# Patient Record
Sex: MALE | Race: WHITE | Employment: OTHER | URBAN - METROPOLITAN AREA
[De-identification: names, ages, dates, MRNs, and addresses within clinical notes are randomized per-mention and may not be internally consistent; named-entity substitution may affect disease eponyms.]

---

## 2018-12-07 ENCOUNTER — HOSPITAL ENCOUNTER (INPATIENT)
Age: 73
LOS: 6 days | Discharge: HOME | DRG: 189 | End: 2018-12-13
Attending: EMERGENCY MEDICINE | Admitting: INTERNAL MEDICINE
Payer: MEDICARE

## 2018-12-07 ENCOUNTER — APPOINTMENT (OUTPATIENT)
Dept: CT IMAGING | Age: 73
DRG: 189 | End: 2018-12-07
Payer: MEDICARE

## 2018-12-07 DIAGNOSIS — I31.39 PERICARDIAL EFFUSION: ICD-10-CM

## 2018-12-07 DIAGNOSIS — K62.5 RECTAL BLEED: ICD-10-CM

## 2018-12-07 DIAGNOSIS — I48.91 ATRIAL FIBRILLATION, UNSPECIFIED TYPE (HCC): ICD-10-CM

## 2018-12-07 DIAGNOSIS — J96.01 ACUTE RESPIRATORY FAILURE WITH HYPOXEMIA (HCC): Primary | ICD-10-CM

## 2018-12-07 DIAGNOSIS — E87.70 HYPERVOLEMIA, UNSPECIFIED HYPERVOLEMIA TYPE: ICD-10-CM

## 2018-12-07 PROBLEM — J96.02 ACUTE RESPIRATORY FAILURE WITH HYPOXIA AND HYPERCAPNIA (HCC): Status: ACTIVE | Noted: 2018-12-07

## 2018-12-07 LAB
ABO/RH: NORMAL
ALBUMIN SERPL-MCNC: 4 G/DL (ref 3.5–5.2)
ALP BLD-CCNC: 73 U/L (ref 40–129)
ALT SERPL-CCNC: 30 U/L (ref 0–40)
ANION GAP SERPL CALCULATED.3IONS-SCNC: 9 MMOL/L (ref 7–16)
ANTIBODY SCREEN: NORMAL
AST SERPL-CCNC: 28 U/L (ref 0–39)
B.E.: 5.4 MMOL/L (ref -3–3)
BASOPHILS ABSOLUTE: 0.04 E9/L (ref 0–0.2)
BASOPHILS RELATIVE PERCENT: 0.6 % (ref 0–2)
BILIRUB SERPL-MCNC: 2.1 MG/DL (ref 0–1.2)
BILIRUBIN URINE: NEGATIVE
BLOOD, URINE: NEGATIVE
BUN BLDV-MCNC: 23 MG/DL (ref 8–23)
CALCIUM SERPL-MCNC: 9.3 MG/DL (ref 8.6–10.2)
CHLORIDE BLD-SCNC: 98 MMOL/L (ref 98–107)
CLARITY: CLEAR
CO2: 35 MMOL/L (ref 22–29)
COHB: 1.3 % (ref 0–1.5)
COLOR: YELLOW
CREAT SERPL-MCNC: 1.8 MG/DL (ref 0.7–1.2)
CRITICAL: ABNORMAL
DATE ANALYZED: ABNORMAL
DATE OF COLLECTION: ABNORMAL
EKG ATRIAL RATE: 93 BPM
EKG Q-T INTERVAL: 392 MS
EKG QRS DURATION: 86 MS
EKG QTC CALCULATION (BAZETT): 460 MS
EKG R AXIS: 91 DEGREES
EKG T AXIS: -96 DEGREES
EKG VENTRICULAR RATE: 83 BPM
EOSINOPHILS ABSOLUTE: 0.07 E9/L (ref 0.05–0.5)
EOSINOPHILS RELATIVE PERCENT: 1 % (ref 0–6)
GFR AFRICAN AMERICAN: 45
GFR NON-AFRICAN AMERICAN: 37 ML/MIN/1.73
GLUCOSE BLD-MCNC: 91 MG/DL (ref 74–99)
GLUCOSE URINE: NEGATIVE MG/DL
HCO3: 35.3 MMOL/L (ref 22–26)
HCT VFR BLD CALC: 48.5 % (ref 37–54)
HEMOGLOBIN: 14.4 G/DL (ref 12.5–16.5)
HHB: 5.2 % (ref 0–5)
IMMATURE GRANULOCYTES #: 0.02 E9/L
IMMATURE GRANULOCYTES %: 0.3 % (ref 0–5)
INR BLD: 1.3
KETONES, URINE: NEGATIVE MG/DL
LAB: ABNORMAL
LACTIC ACID: 1.2 MMOL/L (ref 0.5–2.2)
LEUKOCYTE ESTERASE, URINE: NEGATIVE
LV EF: 50 %
LVEF MODALITY: NORMAL
LYMPHOCYTES ABSOLUTE: 0.72 E9/L (ref 1.5–4)
LYMPHOCYTES RELATIVE PERCENT: 10.1 % (ref 20–42)
Lab: ABNORMAL
MCH RBC QN AUTO: 30.4 PG (ref 26–35)
MCHC RBC AUTO-ENTMCNC: 29.7 % (ref 32–34.5)
MCV RBC AUTO: 102.3 FL (ref 80–99.9)
METHB: 0.3 % (ref 0–1.5)
MODE: ABNORMAL
MONOCYTES ABSOLUTE: 0.64 E9/L (ref 0.1–0.95)
MONOCYTES RELATIVE PERCENT: 8.9 % (ref 2–12)
NEUTROPHILS ABSOLUTE: 5.67 E9/L (ref 1.8–7.3)
NEUTROPHILS RELATIVE PERCENT: 79.1 % (ref 43–80)
NITRITE, URINE: NEGATIVE
O2 CONTENT: 19.4 ML/DL
O2 SATURATION: 94.7 % (ref 92–98.5)
O2HB: 93.2 % (ref 94–97)
OPERATOR ID: 7874
PATIENT TEMP: 37 C
PCO2: 78 MMHG (ref 35–45)
PDW BLD-RTO: 16 FL (ref 11.5–15)
PH BLOOD GAS: 7.27 (ref 7.35–7.45)
PH UA: 5.5 (ref 5–9)
PLATELET # BLD: 203 E9/L (ref 130–450)
PMV BLD AUTO: 11.8 FL (ref 7–12)
PO2: 85.1 MMHG (ref 60–100)
POTASSIUM SERPL-SCNC: 4.8 MMOL/L (ref 3.5–5)
PRO-BNP: 2523 PG/ML (ref 0–125)
PROTEIN UA: NEGATIVE MG/DL
PROTHROMBIN TIME: 15.5 SEC (ref 9.3–12.4)
RBC # BLD: 4.74 E12/L (ref 3.8–5.8)
SODIUM BLD-SCNC: 142 MMOL/L (ref 132–146)
SOURCE, BLOOD GAS: ABNORMAL
SPECIFIC GRAVITY UA: 1.01 (ref 1–1.03)
THB: 14.8 G/DL (ref 11.5–16.5)
TIME ANALYZED: 933
TOTAL PROTEIN: 7.5 G/DL (ref 6.4–8.3)
TROPONIN: <0.01 NG/ML (ref 0–0.03)
UROBILINOGEN, URINE: 0.2 E.U./DL
WBC # BLD: 7.2 E9/L (ref 4.5–11.5)

## 2018-12-07 PROCEDURE — 2580000003 HC RX 258: Performed by: INTERNAL MEDICINE

## 2018-12-07 PROCEDURE — 99223 1ST HOSP IP/OBS HIGH 75: CPT | Performed by: INTERNAL MEDICINE

## 2018-12-07 PROCEDURE — 84484 ASSAY OF TROPONIN QUANT: CPT

## 2018-12-07 PROCEDURE — 94760 N-INVAS EAR/PLS OXIMETRY 1: CPT

## 2018-12-07 PROCEDURE — 80053 COMPREHEN METABOLIC PANEL: CPT

## 2018-12-07 PROCEDURE — 83880 ASSAY OF NATRIURETIC PEPTIDE: CPT

## 2018-12-07 PROCEDURE — 6370000000 HC RX 637 (ALT 250 FOR IP): Performed by: INTERNAL MEDICINE

## 2018-12-07 PROCEDURE — 99222 1ST HOSP IP/OBS MODERATE 55: CPT | Performed by: INTERNAL MEDICINE

## 2018-12-07 PROCEDURE — 86900 BLOOD TYPING SEROLOGIC ABO: CPT

## 2018-12-07 PROCEDURE — 87486 CHLMYD PNEUM DNA AMP PROBE: CPT

## 2018-12-07 PROCEDURE — 87633 RESP VIRUS 12-25 TARGETS: CPT

## 2018-12-07 PROCEDURE — C9113 INJ PANTOPRAZOLE SODIUM, VIA: HCPCS | Performed by: EMERGENCY MEDICINE

## 2018-12-07 PROCEDURE — 93005 ELECTROCARDIOGRAM TRACING: CPT | Performed by: EMERGENCY MEDICINE

## 2018-12-07 PROCEDURE — 86901 BLOOD TYPING SEROLOGIC RH(D): CPT

## 2018-12-07 PROCEDURE — 94660 CPAP INITIATION&MGMT: CPT

## 2018-12-07 PROCEDURE — 87798 DETECT AGENT NOS DNA AMP: CPT

## 2018-12-07 PROCEDURE — 71275 CT ANGIOGRAPHY CHEST: CPT

## 2018-12-07 PROCEDURE — 85610 PROTHROMBIN TIME: CPT

## 2018-12-07 PROCEDURE — 2700000000 HC OXYGEN THERAPY PER DAY

## 2018-12-07 PROCEDURE — 99221 1ST HOSP IP/OBS SF/LOW 40: CPT | Performed by: SURGERY

## 2018-12-07 PROCEDURE — 2060000000 HC ICU INTERMEDIATE R&B

## 2018-12-07 PROCEDURE — 6360000002 HC RX W HCPCS: Performed by: EMERGENCY MEDICINE

## 2018-12-07 PROCEDURE — 85025 COMPLETE CBC W/AUTO DIFF WBC: CPT

## 2018-12-07 PROCEDURE — 93306 TTE W/DOPPLER COMPLETE: CPT

## 2018-12-07 PROCEDURE — 99285 EMERGENCY DEPT VISIT HI MDM: CPT

## 2018-12-07 PROCEDURE — APPSS60 APP SPLIT SHARED TIME 46-60 MINUTES: Performed by: NURSE PRACTITIONER

## 2018-12-07 PROCEDURE — 87581 M.PNEUMON DNA AMP PROBE: CPT

## 2018-12-07 PROCEDURE — 2580000003 HC RX 258: Performed by: EMERGENCY MEDICINE

## 2018-12-07 PROCEDURE — 74174 CTA ABD&PLVS W/CONTRAST: CPT

## 2018-12-07 PROCEDURE — 83605 ASSAY OF LACTIC ACID: CPT

## 2018-12-07 PROCEDURE — 94640 AIRWAY INHALATION TREATMENT: CPT

## 2018-12-07 PROCEDURE — 86850 RBC ANTIBODY SCREEN: CPT

## 2018-12-07 PROCEDURE — 81003 URINALYSIS AUTO W/O SCOPE: CPT

## 2018-12-07 PROCEDURE — 87088 URINE BACTERIA CULTURE: CPT

## 2018-12-07 PROCEDURE — 6360000002 HC RX W HCPCS: Performed by: INTERNAL MEDICINE

## 2018-12-07 PROCEDURE — 82805 BLOOD GASES W/O2 SATURATION: CPT

## 2018-12-07 PROCEDURE — 6360000004 HC RX CONTRAST MEDICATION: Performed by: RADIOLOGY

## 2018-12-07 PROCEDURE — 36415 COLL VENOUS BLD VENIPUNCTURE: CPT

## 2018-12-07 RX ORDER — LEVOTHYROXINE SODIUM 0.1 MG/1
100 TABLET ORAL DAILY
Status: DISCONTINUED | OUTPATIENT
Start: 2018-12-07 | End: 2018-12-13 | Stop reason: HOSPADM

## 2018-12-07 RX ORDER — SODIUM CHLORIDE 0.9 % (FLUSH) 0.9 %
3 SYRINGE (ML) INJECTION PRN
Status: DISCONTINUED | OUTPATIENT
Start: 2018-12-07 | End: 2018-12-13 | Stop reason: HOSPADM

## 2018-12-07 RX ORDER — FUROSEMIDE 10 MG/ML
20 INJECTION INTRAMUSCULAR; INTRAVENOUS ONCE
Status: DISCONTINUED | OUTPATIENT
Start: 2018-12-07 | End: 2018-12-07

## 2018-12-07 RX ORDER — LIDOCAINE 50 MG/G
OINTMENT TOPICAL EVERY 30 MIN PRN
Status: DISCONTINUED | OUTPATIENT
Start: 2018-12-07 | End: 2018-12-13 | Stop reason: HOSPADM

## 2018-12-07 RX ORDER — FUROSEMIDE 10 MG/ML
40 INJECTION INTRAMUSCULAR; INTRAVENOUS 2 TIMES DAILY
Status: COMPLETED | OUTPATIENT
Start: 2018-12-07 | End: 2018-12-07

## 2018-12-07 RX ORDER — FORMOTEROL FUMARATE 20 UG/2ML
20 SOLUTION RESPIRATORY (INHALATION) 2 TIMES DAILY
Status: DISCONTINUED | OUTPATIENT
Start: 2018-12-07 | End: 2018-12-13 | Stop reason: HOSPADM

## 2018-12-07 RX ORDER — FUROSEMIDE 10 MG/ML
40 INJECTION INTRAMUSCULAR; INTRAVENOUS ONCE
Status: COMPLETED | OUTPATIENT
Start: 2018-12-07 | End: 2018-12-07

## 2018-12-07 RX ORDER — DOCUSATE SODIUM 100 MG/1
100 CAPSULE, LIQUID FILLED ORAL 2 TIMES DAILY
Status: DISCONTINUED | OUTPATIENT
Start: 2018-12-07 | End: 2018-12-13 | Stop reason: HOSPADM

## 2018-12-07 RX ORDER — IPRATROPIUM BROMIDE AND ALBUTEROL SULFATE 2.5; .5 MG/3ML; MG/3ML
1 SOLUTION RESPIRATORY (INHALATION)
Status: DISCONTINUED | OUTPATIENT
Start: 2018-12-07 | End: 2018-12-13 | Stop reason: HOSPADM

## 2018-12-07 RX ORDER — BUDESONIDE 0.25 MG/2ML
250 INHALANT ORAL 2 TIMES DAILY
Status: DISCONTINUED | OUTPATIENT
Start: 2018-12-07 | End: 2018-12-13 | Stop reason: HOSPADM

## 2018-12-07 RX ORDER — AMLODIPINE BESYLATE 5 MG/1
5 TABLET ORAL DAILY
Status: DISCONTINUED | OUTPATIENT
Start: 2018-12-07 | End: 2018-12-07

## 2018-12-07 RX ORDER — ACETAMINOPHEN 325 MG/1
650 TABLET ORAL EVERY 6 HOURS PRN
Status: DISCONTINUED | OUTPATIENT
Start: 2018-12-07 | End: 2018-12-13 | Stop reason: HOSPADM

## 2018-12-07 RX ORDER — SODIUM CHLORIDE 0.9 % (FLUSH) 0.9 %
10 SYRINGE (ML) INJECTION ONCE
Status: DISCONTINUED | OUTPATIENT
Start: 2018-12-07 | End: 2018-12-13 | Stop reason: HOSPADM

## 2018-12-07 RX ORDER — ATENOLOL 25 MG/1
25 TABLET ORAL DAILY
Status: DISCONTINUED | OUTPATIENT
Start: 2018-12-07 | End: 2018-12-08

## 2018-12-07 RX ORDER — SPIRONOLACTONE 25 MG/1
25 TABLET ORAL DAILY
Status: DISCONTINUED | OUTPATIENT
Start: 2018-12-07 | End: 2018-12-09

## 2018-12-07 RX ORDER — PANTOPRAZOLE SODIUM 40 MG/10ML
INJECTION, POWDER, LYOPHILIZED, FOR SOLUTION INTRAVENOUS
Status: DISPENSED
Start: 2018-12-07 | End: 2018-12-07

## 2018-12-07 RX ADMIN — Medication: at 17:12

## 2018-12-07 RX ADMIN — SODIUM CHLORIDE 8 MG/HR: 9 INJECTION, SOLUTION INTRAVENOUS at 10:56

## 2018-12-07 RX ADMIN — Medication: at 21:30

## 2018-12-07 RX ADMIN — ATENOLOL 25 MG: 25 TABLET ORAL at 17:12

## 2018-12-07 RX ADMIN — SPIRONOLACTONE 25 MG: 25 TABLET ORAL at 18:21

## 2018-12-07 RX ADMIN — Medication 3 ML: at 17:12

## 2018-12-07 RX ADMIN — SODIUM CHLORIDE 80 MG: 9 INJECTION, SOLUTION INTRAVENOUS at 10:55

## 2018-12-07 RX ADMIN — FUROSEMIDE 40 MG: 10 INJECTION, SOLUTION INTRAMUSCULAR; INTRAVENOUS at 10:56

## 2018-12-07 RX ADMIN — DOCUSATE SODIUM 100 MG: 100 CAPSULE, LIQUID FILLED ORAL at 21:30

## 2018-12-07 RX ADMIN — IPRATROPIUM BROMIDE AND ALBUTEROL SULFATE 1 AMPULE: .5; 3 SOLUTION RESPIRATORY (INHALATION) at 16:05

## 2018-12-07 RX ADMIN — IOPAMIDOL 110 ML: 755 INJECTION, SOLUTION INTRAVENOUS at 09:12

## 2018-12-07 RX ADMIN — FUROSEMIDE 40 MG: 10 INJECTION, SOLUTION INTRAMUSCULAR; INTRAVENOUS at 17:12

## 2018-12-07 RX ADMIN — ACETAMINOPHEN 650 MG: 325 TABLET, FILM COATED ORAL at 22:24

## 2018-12-07 ASSESSMENT — PAIN SCALES - GENERAL
PAINLEVEL_OUTOF10: 9
PAINLEVEL_OUTOF10: 0
PAINLEVEL_OUTOF10: 6

## 2018-12-07 ASSESSMENT — PAIN DESCRIPTION - ORIENTATION: ORIENTATION: UPPER

## 2018-12-07 ASSESSMENT — PAIN DESCRIPTION - DESCRIPTORS
DESCRIPTORS: PRESSURE
DESCRIPTORS: HEADACHE

## 2018-12-07 ASSESSMENT — PAIN DESCRIPTION - LOCATION
LOCATION: ABDOMEN
LOCATION: HEAD

## 2018-12-07 ASSESSMENT — PAIN DESCRIPTION - PAIN TYPE
TYPE: ACUTE PAIN
TYPE: ACUTE PAIN

## 2018-12-07 ASSESSMENT — PAIN DESCRIPTION - ONSET: ONSET: ON-GOING

## 2018-12-07 ASSESSMENT — PAIN DESCRIPTION - FREQUENCY: FREQUENCY: CONTINUOUS

## 2018-12-07 ASSESSMENT — PAIN DESCRIPTION - PROGRESSION: CLINICAL_PROGRESSION: NOT CHANGED

## 2018-12-07 NOTE — PROGRESS NOTES
Date: 12/7/2018    Time: 9:48 AM    Patient Placed On BIPAP/CPAP/ Non-Invasive Ventilation? Yes    If no must comment. Facial area red/color change? No           If YES are Blister/Lesion present? No   If yes must notify nursing staff  BIPAP/CPAP skin barrier?   Yes    Skin barrier type:Liquicel       Comments:        Alexa Morris

## 2018-12-07 NOTE — ED NOTES
Called and gave report to the receiving RN. Pt at this time is stable.       Tenisha Mcneal RN  12/07/18 1127

## 2018-12-07 NOTE — CARE COORDINATION
CM left voicemail at [place] intake dept to notify of pt's admission here at [facility] [phone]). Will await return call from [place] intake nurse regarding service connection, travel benefits, and bed availability at [facility].  Per patient, he will transfer to [facility] when stable, or when bed available. Pt continues on nrb at present.  Will continue to follow

## 2018-12-07 NOTE — CONSULTS
Inpatient Cardiology Consultation      Reason for Consult:  CHF, Pericardial effusion    Consulting Physician: Dr. Rosie Delgado     Requesting Physician:  Dr. Loretta Gauthier    Date of Consultation: 12/7/2018    HISTORY OF PRESENT ILLNESS: Mr. Paul Choi is a 72year old morbidly obese male who is previously known to [facility] (Records are unavailable at this time and have been requested). Review of medical records, patient was seen by Dr. Lorelei Fleischer in 3/8/2016 in hospital consultation for SOB and underwent an ECHO (3/2016) showing EF 60%, Trace MR. Mr. Paul Choi presented to [facility] on 12/7/2018 with complaints of worsening SOB at rest and with exertion, poor appetite, BLE edema, weight gain ~ 5-7 lbs, abdomen bloating and \"tightness\" and \"bright red\" rectal bleeding x 2-3 days PTA. He denies dysuria and has been taking Lasix 40 mg BID. He stopped taking Xarelto 2 days ago due to rectal bleeding but did not seek help or contact the [facility]. Denies palpitations, heart racing, nausea, vomiting, fever, chills, syncope, near syncope. He is prescribed home O2 during the day and overnight but has not been wearing it and reported \"I haven't needed it. \"     Upon arrival to the ED: Labs included proBNP 2532, normal AST/ALT, bilirubin 2.1, BUN/Cr 23/1.8, K+ 4.8, troponin <0.01, WBC 7.2, H/H 14.4/48. 5. ABG: pH 7.273, pCO2 78.0, pO2 85.1, HCO3 35.3. Va: /118, HR 84, 84% on RA. He was placed on BiPAP therapy. CTA Chest: Mod sized right pleural effusion 2.5 cm, Ascites, and aneurysmal dilatation of the ascending aorta measuring 4 x 4 cm (See full report below). CT Abdomen/Pelvis: Mod sized ascites, mild hepatic contour lobulation and a hepatic diffuse low attenuation possibly representing underlying hepatic disease or hepatic steatosis, sigmoid diverticulosis. EKG: Atrial fibrillation, rightward axis, NSSTT changes, rate 83 bpm. He was given Lasix 40 mg IV and started on a Protonix IV gtt.      Please note: past medical records habitus, and accuracy will be affected. B. Admitted 12/7/2018: SOB and Rectal bleeding. proBNP 2523. H/H 14.4/48.5.   5. HTN  6. Hypothyroidism: on replacement therapy   7. ZOE: denies using CPAP; wears nocturnal O2.   8. COPD with remote history of tobacco abuse. A. Home O2: (1.5L at night; 3L during the day)  9. GERD  10. CTA Chest: 12/7/2018: Mod size right pleural effusion measuring 2.5 cm in greatest thickness. 11. CTA Abdomen: moderate size ascites (12/7/2018)   12. History of \"Normal cardiac cath\" in 2014 at [facility]  13. History of Diverticulitis   14. CKD: SCr 1.4-1.6 (3/2016). A. SCr 1.8 (12/7/2018). Past Surgical History:    Past Surgical History:   Procedure Laterality Date    ABDOMEN SURGERY      CARDIAC CATHETERIZATION      CHOLECYSTECTOMY      COLONOSCOPY      DILATATION, ESOPHAGUS      ENDOSCOPY, COLON, DIAGNOSTIC      FRACTURE SURGERY      JOINT REPLACEMENT      SALIVARY GLAND SURGERY      VASCULAR SURGERY         Medications Prior to admit:  Reviewed with patient:  Cardiac medications that he is taking prescribed by [facility]:   -Coreg 6.25 mg QD    -Lasix 40 mg BID   -Aldactone 25 mg BID   -Xarelto 20 mg QD    Prior to Admission medications    Medication Sig Start Date End Date Taking?  Authorizing Provider   acetaminophen (TYLENOL) 500 MG tablet Take 1 tablet by mouth every 6 hours as needed 3/9/16   Jhonathan Romulo, DO   Heparin Sodium, Porcine, (HEPARIN, PORCINE,) 5000 UNIT/ML injection Inject 1 mL into the skin every 8 hours 3/9/16   Jhonathan Romulo, DO   promethazine (PHENERGAN) 25 MG/ML injection Inject 1 mL into the muscle every 6 hours as needed 3/9/16   Jhonathan Romulo, DO   terazosin (HYTRIN) 2 MG capsule Take 1 capsule by mouth nightly Take 2 tablets at bedtime 3/9/16   Jhonathan Romulo, DO   atenolol (TENORMIN) 25 MG tablet Take 1 tablet by mouth daily 3/9/16   [facility] Romulo, DO   amLODIPine (NORVASC) 5 MG tablet Take 1 tablet by mouth daily 3/9/16

## 2018-12-07 NOTE — CONSULTS
· Gastroenterology: No dysphagia, no reflux; no abdominal pain, no nausea or vomiting; no constipation or diarrhea. Hematochezia, abdominal distention and \"tightness\"  · Genitourinary: No dysuria, no frequency, hesitancy; no hematuria  · Musculoskeletal: no joint pain, no myalgia, no change in range of movement  · Neurology: no focal weakness in extremities, no slurred speech, no double vision, no tingling or numbness sensation  · Endocrinology: no temperature intolerance, no polyphagia, polydipsia or polyuria  · Hematology: no increased bleeding, no bruising, no lymphadenopathy  · Skin: no skin changes noticed by patient  · Psychology: no depressed mood, no suicidal ideation    PHYSICAL EXAM:    Vitals:    12/07/18 1428   BP: (!) 165/100   Pulse: 95   Resp: 16   Temp: 97.2 °F (36.2 °C)   SpO2:        General Appearance:  awake, alert, oriented, in no acute distress, has bipap on   Skin:  Skin color, texture, turgor normal. No rashes or lesions. Head/face:  NCAT  Eyes:  No gross abnormalities. Lungs:  Normal expansion. Clear to auscultation. No rales, rhonchi, or wheezing. Heart:  Heart sounds are normal.  Regular rate and rhythm without murmur, gallop or rub. Abdomen:  Soft, non-tender, normal bowel sounds. No bruits, organomegaly or masses. FOB+, external hemorrhoids   Extremities: Extremities warm to touch, pink, 2+ edema on BL LE    LABS:  CBC  Recent Labs      12/07/18   0900   WBC  7.2   HGB  14.4   HCT  48.5   PLT  203     BMP  Recent Labs      12/07/18   0900   NA  142   K  4.8   CL  98   CO2  35*   BUN  23   CREATININE  1.8*   CALCIUM  9.3     Liver Function  Recent Labs      12/07/18   0900   BILITOT  2.1*   AST  28   ALT  30   ALKPHOS  73   PROT  7.5   LABALBU  4.0     No results for input(s): LACTATE in the last 72 hours. Recent Labs      12/07/18   0900   INR  1.3       RADIOLOGY  Cta Chest W Contrast  Result Date: 12/7/2018    1. No evidence for aortic dissection.  2. Aneurysmal dilatation of

## 2018-12-07 NOTE — PROGRESS NOTES
Clari Iglesias 476  Internal Medicine Residency / [facility] Medicine Service    Attending Physician Statement, initial evaluation  I have discussed the case, including pertinent history (I reviewed medical, surg, soc and fam histories) and exam findings with the resident and the team.  I have seen and examined the patient, reviewed meds, ECG, echo report and CT imaging and pertinent labs and the key elements of the encounter have been performed by me. I agree with the assessment, plan and orders as documented by the resident. 73 yo man admitted with acute respiratory failure (hypoxic and hypercarbic), improved with Bipap and acute GI bleeding. He states that he had developed bright red rectal bleeding and darker blood in stool about 2-3 days ago. He was concerned and so he stopped taking his medications including diuretics. Has hx  A fib and had been on xarelto which he also stopped. In addition he has CKD stage 3 with recent creatinine that appears to be higher than previously (1.8 now). He also has COPD and likely has ZOE although he denied it and is on home O2 as needed (states he does not wear a mask at night). Additional co-morbidities include HTN and hypothyroidism. Will resume diuretics and hold xarelto. Cardio has seen and 2 -D echo done to assess pericardial effusion. Will ask gen surg to see for endoscopy (patient states he has hx multiple polyps and has colonscopy q 2 years and last one a year ago and states he had to be \"cauterized\". Remainder of medical problems as per resident note.       [facility]  Internal Medicine Residency Faculty

## 2018-12-07 NOTE — PROGRESS NOTES
Name: Nadja Sanchezutant  : 1945  MRN: [id number]    Date: 2018    Benefits of immediately proceeding with Radiology exam outweigh the risks and therefore the following is being waived:      [] Pregnancy test    [] Protocol for Iodine allergy    [] MRI questionnaire    [x] BUN/Creatinine        Juan José Salomon DO

## 2018-12-07 NOTE — H&P
tablet by mouth every 6 hours as needed 3/9/16   Ezra Santos, DO   Heparin Sodium, Porcine, (HEPARIN, PORCINE,) 5000 UNIT/ML injection Inject 1 mL into the skin every 8 hours 3/9/16   Ezra Santos, DO   promethazine (PHENERGAN) 25 MG/ML injection Inject 1 mL into the muscle every 6 hours as needed 3/9/16   Ezra Santos, DO   terazosin (HYTRIN) 2 MG capsule Take 1 capsule by mouth nightly Take 2 tablets at bedtime 3/9/16   Ezra Santos, DO   atenolol (TENORMIN) 25 MG tablet Take 1 tablet by mouth daily 3/9/16   Ezra Santos, DO   amLODIPine (NORVASC) 5 MG tablet Take 1 tablet by mouth daily 3/9/16   Ezra Santos, DO   miconazole nitrate 2 % OINT Apply topically 2 times daily 3/9/16   Ezra Santos, DO   furosemide (LASIX) 40 MG tablet Take 1 tablet by mouth 2 times daily 3/9/16   Ezra Santos, DO   docusate sodium (COLACE, DULCOLAX) 100 MG CAPS Take 100 mg by mouth 2 times daily 3/9/16   Ezra Santos, DO   magnesium hydroxide (MILK OF MAGNESIA) 400 MG/5ML suspension Take 30 mLs by mouth daily as needed for Constipation 3/9/16   Ezra Santos, DO   levothyroxine (SYNTHROID) 100 MCG tablet Take 1 tablet by mouth Daily 3/9/16   Ezra Santos, DO   pantoprazole sodium (PROTONIX) 40 MG PACK packet Take 1 packet by mouth 2 times daily (before meals) 3/9/16   Ezra Santos, DO   spironolactone (ALDACTONE) 25 MG tablet Take 25 mg by mouth daily Take 2 tablets daily    Historical Provider, MD       Allergies:  Amlodipine; Isosorbide mononitrate [isosorbide nitrate]; and Lisinopril    Social History:   TOBACCO:   reports that he quit smoking about 18 years ago. He does not have any smokeless tobacco history on file. ETOH:   reports that he drinks about 12.6 oz of alcohol per week . OCCUPATION:    Family History:   History reviewed. No pertinent family history.     REVIEW OF SYSTEMS:    · Constitutional: No fever, no chills, no change in weight; not eating well   · HEENT: No blurred

## 2018-12-08 ENCOUNTER — ANESTHESIA EVENT (OUTPATIENT)
Dept: ENDOSCOPY | Age: 73
DRG: 189 | End: 2018-12-08
Payer: MEDICARE

## 2018-12-08 LAB
ANION GAP SERPL CALCULATED.3IONS-SCNC: 8 MMOL/L (ref 7–16)
BUN BLDV-MCNC: 27 MG/DL (ref 8–23)
CALCIUM SERPL-MCNC: 9 MG/DL (ref 8.6–10.2)
CHLORIDE BLD-SCNC: 97 MMOL/L (ref 98–107)
CO2: 37 MMOL/L (ref 22–29)
CREAT SERPL-MCNC: 1.9 MG/DL (ref 0.7–1.2)
FILM ARRAY ADENOVIRUS: NORMAL
FILM ARRAY BORDETELLA PERTUSSIS: NORMAL
FILM ARRAY CHLAMYDOPHILIA PNEUMONIAE: NORMAL
FILM ARRAY CORONAVIRUS 229E: NORMAL
FILM ARRAY CORONAVIRUS HKU1: NORMAL
FILM ARRAY CORONAVIRUS NL63: NORMAL
FILM ARRAY CORONAVIRUS OC43: NORMAL
FILM ARRAY INFLUENZA A VIRUS 09H1: NORMAL
FILM ARRAY INFLUENZA A VIRUS H1: NORMAL
FILM ARRAY INFLUENZA A VIRUS H3: NORMAL
FILM ARRAY INFLUENZA A VIRUS: NORMAL
FILM ARRAY INFLUENZA B: NORMAL
FILM ARRAY METAPNEUMOVIRUS: NORMAL
FILM ARRAY MYCOPLASMA PNEUMONIAE: NORMAL
FILM ARRAY PARAINFLUENZA VIRUS 1: NORMAL
FILM ARRAY PARAINFLUENZA VIRUS 2: NORMAL
FILM ARRAY PARAINFLUENZA VIRUS 3: NORMAL
FILM ARRAY PARAINFLUENZA VIRUS 4: NORMAL
FILM ARRAY RESPIRATORY SYNCITIAL VIRUS: NORMAL
FILM ARRAY RHINOVIRUS/ENTEROVIRUS: NORMAL
FOLATE: 10.8 NG/ML (ref 4.8–24.2)
GFR AFRICAN AMERICAN: 42
GFR NON-AFRICAN AMERICAN: 35 ML/MIN/1.73
GLUCOSE BLD-MCNC: 110 MG/DL (ref 74–99)
HCT VFR BLD CALC: 47.4 % (ref 37–54)
HEMOGLOBIN: 13.7 G/DL (ref 12.5–16.5)
MCH RBC QN AUTO: 30 PG (ref 26–35)
MCHC RBC AUTO-ENTMCNC: 28.9 % (ref 32–34.5)
MCV RBC AUTO: 103.7 FL (ref 80–99.9)
PDW BLD-RTO: 16.1 FL (ref 11.5–15)
PLATELET # BLD: 195 E9/L (ref 130–450)
PMV BLD AUTO: 12.1 FL (ref 7–12)
POTASSIUM SERPL-SCNC: 4.7 MMOL/L (ref 3.5–5)
RBC # BLD: 4.57 E12/L (ref 3.8–5.8)
SODIUM BLD-SCNC: 142 MMOL/L (ref 132–146)
TSH SERPL DL<=0.05 MIU/L-ACNC: 6.09 UIU/ML (ref 0.27–4.2)
VITAMIN B-12: 511 PG/ML (ref 211–946)
WBC # BLD: 7.1 E9/L (ref 4.5–11.5)

## 2018-12-08 PROCEDURE — 2580000003 HC RX 258: Performed by: EMERGENCY MEDICINE

## 2018-12-08 PROCEDURE — 6360000002 HC RX W HCPCS: Performed by: INTERNAL MEDICINE

## 2018-12-08 PROCEDURE — C9113 INJ PANTOPRAZOLE SODIUM, VIA: HCPCS | Performed by: EMERGENCY MEDICINE

## 2018-12-08 PROCEDURE — 6370000000 HC RX 637 (ALT 250 FOR IP): Performed by: INTERNAL MEDICINE

## 2018-12-08 PROCEDURE — 2060000000 HC ICU INTERMEDIATE R&B

## 2018-12-08 PROCEDURE — 94660 CPAP INITIATION&MGMT: CPT

## 2018-12-08 PROCEDURE — 82607 VITAMIN B-12: CPT

## 2018-12-08 PROCEDURE — 80048 BASIC METABOLIC PNL TOTAL CA: CPT

## 2018-12-08 PROCEDURE — 94640 AIRWAY INHALATION TREATMENT: CPT

## 2018-12-08 PROCEDURE — 84443 ASSAY THYROID STIM HORMONE: CPT

## 2018-12-08 PROCEDURE — 6360000002 HC RX W HCPCS: Performed by: EMERGENCY MEDICINE

## 2018-12-08 PROCEDURE — 99231 SBSQ HOSP IP/OBS SF/LOW 25: CPT | Performed by: SURGERY

## 2018-12-08 PROCEDURE — 82746 ASSAY OF FOLIC ACID SERUM: CPT

## 2018-12-08 PROCEDURE — 6370000000 HC RX 637 (ALT 250 FOR IP): Performed by: STUDENT IN AN ORGANIZED HEALTH CARE EDUCATION/TRAINING PROGRAM

## 2018-12-08 PROCEDURE — 99233 SBSQ HOSP IP/OBS HIGH 50: CPT | Performed by: INTERNAL MEDICINE

## 2018-12-08 PROCEDURE — 85027 COMPLETE CBC AUTOMATED: CPT

## 2018-12-08 PROCEDURE — 2700000000 HC OXYGEN THERAPY PER DAY

## 2018-12-08 PROCEDURE — 36415 COLL VENOUS BLD VENIPUNCTURE: CPT

## 2018-12-08 RX ORDER — TORSEMIDE 20 MG/1
40 TABLET ORAL 2 TIMES DAILY
Status: DISCONTINUED | OUTPATIENT
Start: 2018-12-08 | End: 2018-12-09

## 2018-12-08 RX ORDER — CARVEDILOL 3.12 MG/1
3.12 TABLET ORAL 2 TIMES DAILY WITH MEALS
Status: DISCONTINUED | OUTPATIENT
Start: 2018-12-08 | End: 2018-12-13 | Stop reason: HOSPADM

## 2018-12-08 RX ORDER — FUROSEMIDE 10 MG/ML
40 INJECTION INTRAMUSCULAR; INTRAVENOUS 2 TIMES DAILY
Status: DISCONTINUED | OUTPATIENT
Start: 2018-12-08 | End: 2018-12-08

## 2018-12-08 RX ORDER — FOLIC ACID 1 MG/1
1 TABLET ORAL DAILY
Status: DISCONTINUED | OUTPATIENT
Start: 2018-12-08 | End: 2018-12-13 | Stop reason: HOSPADM

## 2018-12-08 RX ADMIN — IPRATROPIUM BROMIDE AND ALBUTEROL SULFATE 1 AMPULE: .5; 3 SOLUTION RESPIRATORY (INHALATION) at 20:24

## 2018-12-08 RX ADMIN — TORSEMIDE 40 MG: 20 TABLET ORAL at 20:16

## 2018-12-08 RX ADMIN — DOCUSATE SODIUM 100 MG: 100 CAPSULE, LIQUID FILLED ORAL at 08:45

## 2018-12-08 RX ADMIN — TORSEMIDE 40 MG: 20 TABLET ORAL at 12:38

## 2018-12-08 RX ADMIN — FORMOTEROL FUMARATE DIHYDRATE 20 MCG: 20 SOLUTION RESPIRATORY (INHALATION) at 20:24

## 2018-12-08 RX ADMIN — BUDESONIDE 250 MCG: 0.25 SUSPENSION RESPIRATORY (INHALATION) at 20:25

## 2018-12-08 RX ADMIN — DOCUSATE SODIUM 100 MG: 100 CAPSULE, LIQUID FILLED ORAL at 20:16

## 2018-12-08 RX ADMIN — IPRATROPIUM BROMIDE AND ALBUTEROL SULFATE 1 AMPULE: .5; 3 SOLUTION RESPIRATORY (INHALATION) at 09:04

## 2018-12-08 RX ADMIN — CARVEDILOL 3.12 MG: 3.12 TABLET, FILM COATED ORAL at 17:05

## 2018-12-08 RX ADMIN — SODIUM CHLORIDE 8 MG/HR: 9 INJECTION, SOLUTION INTRAVENOUS at 13:39

## 2018-12-08 RX ADMIN — FORMOTEROL FUMARATE DIHYDRATE 20 MCG: 20 SOLUTION RESPIRATORY (INHALATION) at 09:05

## 2018-12-08 RX ADMIN — Medication: at 20:19

## 2018-12-08 RX ADMIN — SODIUM CHLORIDE 8 MG/HR: 9 INJECTION, SOLUTION INTRAVENOUS at 00:44

## 2018-12-08 RX ADMIN — CARVEDILOL 3.12 MG: 3.12 TABLET, FILM COATED ORAL at 12:38

## 2018-12-08 RX ADMIN — BUDESONIDE 250 MCG: 0.25 SUSPENSION RESPIRATORY (INHALATION) at 09:04

## 2018-12-08 RX ADMIN — Medication: at 12:39

## 2018-12-08 RX ADMIN — SPIRONOLACTONE 25 MG: 25 TABLET ORAL at 08:46

## 2018-12-08 RX ADMIN — SODIUM CHLORIDE 8 MG/HR: 9 INJECTION, SOLUTION INTRAVENOUS at 22:35

## 2018-12-08 RX ADMIN — LEVOTHYROXINE SODIUM 100 MCG: 100 TABLET ORAL at 06:31

## 2018-12-08 RX ADMIN — FOLIC ACID 1 MG: 1 TABLET ORAL at 12:38

## 2018-12-08 RX ADMIN — POLYETHYLENE GLYCOL 3350, SODIUM SULFATE ANHYDROUS, SODIUM BICARBONATE, SODIUM CHLORIDE, POTASSIUM CHLORIDE 4000 ML: 236; 22.74; 6.74; 5.86; 2.97 POWDER, FOR SOLUTION ORAL at 15:39

## 2018-12-08 RX ADMIN — ATENOLOL 25 MG: 25 TABLET ORAL at 08:46

## 2018-12-08 ASSESSMENT — PAIN SCALES - GENERAL
PAINLEVEL_OUTOF10: 0

## 2018-12-08 ASSESSMENT — ENCOUNTER SYMPTOMS: SHORTNESS OF BREATH: 1

## 2018-12-08 NOTE — PROGRESS NOTES
output, sob      Afib - rate controlled    Home med: xarelto   Rate 70-90   - hold xarelto for now due to bleeding      Lower GI bleeding, likely 2/2 hemorrhoid?, Hx diverticulitis   Vital sign stable  FOBT +  Hb 14   - GS consulted: will EGD, Colonoscopy today      CKD stage 3  Creatinine 1.8,BASELINE 1.4   - follow BMP daily     Hypothyroidism   Home meds synthroid   - continue synthroid 100 mcg   - TSH      Hx of AAA   CT Scan: no aortic dissection.  Ascending Aorta 4x4 cm     PT/OT evaluation:  DVT prophylaxis/ GI prophylaxis:   Disposition: home +/- home health / SNF / Rosie Howell / Yael Johnston MD / DO, PGY-1  Attending physician: Dr. Ben Ya

## 2018-12-09 ENCOUNTER — ANESTHESIA (OUTPATIENT)
Dept: ENDOSCOPY | Age: 73
DRG: 189 | End: 2018-12-09
Payer: MEDICARE

## 2018-12-09 VITALS
SYSTOLIC BLOOD PRESSURE: 100 MMHG | DIASTOLIC BLOOD PRESSURE: 53 MMHG | RESPIRATION RATE: 11 BRPM | OXYGEN SATURATION: 87 %

## 2018-12-09 LAB
ANION GAP SERPL CALCULATED.3IONS-SCNC: 10 MMOL/L (ref 7–16)
BUN BLDV-MCNC: 30 MG/DL (ref 8–23)
CALCIUM SERPL-MCNC: 8.7 MG/DL (ref 8.6–10.2)
CHLORIDE BLD-SCNC: 91 MMOL/L (ref 98–107)
CO2: 40 MMOL/L (ref 22–29)
CREAT SERPL-MCNC: 2.1 MG/DL (ref 0.7–1.2)
GFR AFRICAN AMERICAN: 38
GFR NON-AFRICAN AMERICAN: 31 ML/MIN/1.73
GLUCOSE BLD-MCNC: 80 MG/DL (ref 74–99)
HCT VFR BLD CALC: 45.6 % (ref 37–54)
HEMOGLOBIN: 13.4 G/DL (ref 12.5–16.5)
MCH RBC QN AUTO: 30.2 PG (ref 26–35)
MCHC RBC AUTO-ENTMCNC: 29.4 % (ref 32–34.5)
MCV RBC AUTO: 102.7 FL (ref 80–99.9)
PDW BLD-RTO: 15.9 FL (ref 11.5–15)
PLATELET # BLD: 175 E9/L (ref 130–450)
PMV BLD AUTO: 11.8 FL (ref 7–12)
POTASSIUM SERPL-SCNC: 4.4 MMOL/L (ref 3.5–5)
RBC # BLD: 4.44 E12/L (ref 3.8–5.8)
SODIUM BLD-SCNC: 141 MMOL/L (ref 132–146)
WBC # BLD: 7.3 E9/L (ref 4.5–11.5)

## 2018-12-09 PROCEDURE — 2580000003 HC RX 258: Performed by: INTERNAL MEDICINE

## 2018-12-09 PROCEDURE — 2709999900 HC NON-CHARGEABLE SUPPLY: Performed by: SURGERY

## 2018-12-09 PROCEDURE — 6360000002 HC RX W HCPCS: Performed by: STUDENT IN AN ORGANIZED HEALTH CARE EDUCATION/TRAINING PROGRAM

## 2018-12-09 PROCEDURE — 3700000000 HC ANESTHESIA ATTENDED CARE: Performed by: SURGERY

## 2018-12-09 PROCEDURE — 2580000003 HC RX 258: Performed by: STUDENT IN AN ORGANIZED HEALTH CARE EDUCATION/TRAINING PROGRAM

## 2018-12-09 PROCEDURE — 2700000000 HC OXYGEN THERAPY PER DAY

## 2018-12-09 PROCEDURE — C9113 INJ PANTOPRAZOLE SODIUM, VIA: HCPCS | Performed by: STUDENT IN AN ORGANIZED HEALTH CARE EDUCATION/TRAINING PROGRAM

## 2018-12-09 PROCEDURE — 2580000003 HC RX 258: Performed by: NURSE ANESTHETIST, CERTIFIED REGISTERED

## 2018-12-09 PROCEDURE — 45378 DIAGNOSTIC COLONOSCOPY: CPT | Performed by: SURGERY

## 2018-12-09 PROCEDURE — 7100000001 HC PACU RECOVERY - ADDTL 15 MIN: Performed by: SURGERY

## 2018-12-09 PROCEDURE — 7100000000 HC PACU RECOVERY - FIRST 15 MIN: Performed by: SURGERY

## 2018-12-09 PROCEDURE — 43235 EGD DIAGNOSTIC BRUSH WASH: CPT | Performed by: SURGERY

## 2018-12-09 PROCEDURE — 94660 CPAP INITIATION&MGMT: CPT

## 2018-12-09 PROCEDURE — 0DJD8ZZ INSPECTION OF LOWER INTESTINAL TRACT, VIA NATURAL OR ARTIFICIAL OPENING ENDOSCOPIC: ICD-10-PCS | Performed by: SURGERY

## 2018-12-09 PROCEDURE — 3700000001 HC ADD 15 MINUTES (ANESTHESIA): Performed by: SURGERY

## 2018-12-09 PROCEDURE — 6370000000 HC RX 637 (ALT 250 FOR IP): Performed by: INTERNAL MEDICINE

## 2018-12-09 PROCEDURE — 3609012800 HC EGD DIAGNOSTIC ONLY: Performed by: SURGERY

## 2018-12-09 PROCEDURE — 0DJ08ZZ INSPECTION OF UPPER INTESTINAL TRACT, VIA NATURAL OR ARTIFICIAL OPENING ENDOSCOPIC: ICD-10-PCS | Performed by: SURGERY

## 2018-12-09 PROCEDURE — 80048 BASIC METABOLIC PNL TOTAL CA: CPT

## 2018-12-09 PROCEDURE — 2060000000 HC ICU INTERMEDIATE R&B

## 2018-12-09 PROCEDURE — 6360000002 HC RX W HCPCS: Performed by: INTERNAL MEDICINE

## 2018-12-09 PROCEDURE — 6370000000 HC RX 637 (ALT 250 FOR IP): Performed by: STUDENT IN AN ORGANIZED HEALTH CARE EDUCATION/TRAINING PROGRAM

## 2018-12-09 PROCEDURE — 85027 COMPLETE CBC AUTOMATED: CPT

## 2018-12-09 PROCEDURE — 99232 SBSQ HOSP IP/OBS MODERATE 35: CPT | Performed by: INTERNAL MEDICINE

## 2018-12-09 PROCEDURE — 36415 COLL VENOUS BLD VENIPUNCTURE: CPT

## 2018-12-09 PROCEDURE — 86677 HELICOBACTER PYLORI ANTIBODY: CPT

## 2018-12-09 PROCEDURE — 94640 AIRWAY INHALATION TREATMENT: CPT

## 2018-12-09 PROCEDURE — 3609009500 HC COLONOSCOPY DIAGNOSTIC OR SCREENING: Performed by: SURGERY

## 2018-12-09 PROCEDURE — 6360000002 HC RX W HCPCS: Performed by: NURSE ANESTHETIST, CERTIFIED REGISTERED

## 2018-12-09 RX ORDER — MORPHINE SULFATE 2 MG/ML
2 INJECTION, SOLUTION INTRAMUSCULAR; INTRAVENOUS EVERY 5 MIN PRN
Status: DISCONTINUED | OUTPATIENT
Start: 2018-12-09 | End: 2018-12-09 | Stop reason: HOSPADM

## 2018-12-09 RX ORDER — PROMETHAZINE HYDROCHLORIDE 25 MG/ML
6.25 INJECTION, SOLUTION INTRAMUSCULAR; INTRAVENOUS EVERY 10 MIN PRN
Status: DISCONTINUED | OUTPATIENT
Start: 2018-12-09 | End: 2018-12-09 | Stop reason: HOSPADM

## 2018-12-09 RX ORDER — 0.9 % SODIUM CHLORIDE 0.9 %
10 VIAL (ML) INJECTION 2 TIMES DAILY
Status: DISCONTINUED | OUTPATIENT
Start: 2018-12-09 | End: 2018-12-09

## 2018-12-09 RX ORDER — SODIUM CHLORIDE 9 MG/ML
INJECTION, SOLUTION INTRAVENOUS CONTINUOUS
Status: ACTIVE | OUTPATIENT
Start: 2018-12-09 | End: 2018-12-09

## 2018-12-09 RX ORDER — SUCRALFATE 1 G/1
1 TABLET ORAL EVERY 6 HOURS SCHEDULED
Status: DISCONTINUED | OUTPATIENT
Start: 2018-12-09 | End: 2018-12-13 | Stop reason: HOSPADM

## 2018-12-09 RX ORDER — PROPOFOL 10 MG/ML
INJECTION, EMULSION INTRAVENOUS PRN
Status: DISCONTINUED | OUTPATIENT
Start: 2018-12-09 | End: 2018-12-09 | Stop reason: SDUPTHER

## 2018-12-09 RX ORDER — HYDROCODONE BITARTRATE AND ACETAMINOPHEN 5; 325 MG/1; MG/1
2 TABLET ORAL PRN
Status: DISCONTINUED | OUTPATIENT
Start: 2018-12-09 | End: 2018-12-09 | Stop reason: HOSPADM

## 2018-12-09 RX ORDER — HYDROCODONE BITARTRATE AND ACETAMINOPHEN 5; 325 MG/1; MG/1
1 TABLET ORAL PRN
Status: DISCONTINUED | OUTPATIENT
Start: 2018-12-09 | End: 2018-12-09 | Stop reason: HOSPADM

## 2018-12-09 RX ORDER — MEPERIDINE HYDROCHLORIDE 50 MG/ML
12.5 INJECTION INTRAMUSCULAR; INTRAVENOUS; SUBCUTANEOUS EVERY 5 MIN PRN
Status: DISCONTINUED | OUTPATIENT
Start: 2018-12-09 | End: 2018-12-09 | Stop reason: HOSPADM

## 2018-12-09 RX ORDER — PANTOPRAZOLE SODIUM 40 MG/1
40 TABLET, DELAYED RELEASE ORAL
Status: DISCONTINUED | OUTPATIENT
Start: 2018-12-10 | End: 2018-12-10

## 2018-12-09 RX ORDER — SODIUM CHLORIDE 9 MG/ML
INJECTION, SOLUTION INTRAVENOUS CONTINUOUS PRN
Status: DISCONTINUED | OUTPATIENT
Start: 2018-12-09 | End: 2018-12-09 | Stop reason: SDUPTHER

## 2018-12-09 RX ORDER — PANTOPRAZOLE SODIUM 40 MG/10ML
40 INJECTION, POWDER, LYOPHILIZED, FOR SOLUTION INTRAVENOUS 2 TIMES DAILY
Status: DISCONTINUED | OUTPATIENT
Start: 2018-12-09 | End: 2018-12-09

## 2018-12-09 RX ORDER — MORPHINE SULFATE 2 MG/ML
1 INJECTION, SOLUTION INTRAMUSCULAR; INTRAVENOUS EVERY 5 MIN PRN
Status: DISCONTINUED | OUTPATIENT
Start: 2018-12-09 | End: 2018-12-09 | Stop reason: HOSPADM

## 2018-12-09 RX ORDER — TORSEMIDE 20 MG/1
20 TABLET ORAL 2 TIMES DAILY
Status: DISCONTINUED | OUTPATIENT
Start: 2018-12-09 | End: 2018-12-09

## 2018-12-09 RX ADMIN — PROPOFOL 20 MG: 10 INJECTION, EMULSION INTRAVENOUS at 09:10

## 2018-12-09 RX ADMIN — SODIUM CHLORIDE 8 MG/HR: 9 INJECTION, SOLUTION INTRAVENOUS at 13:01

## 2018-12-09 RX ADMIN — SODIUM CHLORIDE: 9 INJECTION, SOLUTION INTRAVENOUS at 08:44

## 2018-12-09 RX ADMIN — PROPOFOL 30 MG: 10 INJECTION, EMULSION INTRAVENOUS at 09:04

## 2018-12-09 RX ADMIN — BUDESONIDE 250 MCG: 0.25 SUSPENSION RESPIRATORY (INHALATION) at 20:09

## 2018-12-09 RX ADMIN — DOCUSATE SODIUM 100 MG: 100 CAPSULE, LIQUID FILLED ORAL at 19:59

## 2018-12-09 RX ADMIN — IPRATROPIUM BROMIDE AND ALBUTEROL SULFATE 1 AMPULE: .5; 3 SOLUTION RESPIRATORY (INHALATION) at 16:17

## 2018-12-09 RX ADMIN — SODIUM CHLORIDE: 9 INJECTION, SOLUTION INTRAVENOUS at 12:02

## 2018-12-09 RX ADMIN — SUCRALFATE 1 G: 1 TABLET ORAL at 17:24

## 2018-12-09 RX ADMIN — Medication: at 20:00

## 2018-12-09 RX ADMIN — FOLIC ACID 1 MG: 1 TABLET ORAL at 11:17

## 2018-12-09 RX ADMIN — PROPOFOL 30 MG: 10 INJECTION, EMULSION INTRAVENOUS at 08:59

## 2018-12-09 RX ADMIN — SODIUM CHLORIDE 8 MG/HR: 9 INJECTION, SOLUTION INTRAVENOUS at 20:07

## 2018-12-09 RX ADMIN — FORMOTEROL FUMARATE DIHYDRATE 20 MCG: 20 SOLUTION RESPIRATORY (INHALATION) at 20:09

## 2018-12-09 RX ADMIN — PROPOFOL 20 MG: 10 INJECTION, EMULSION INTRAVENOUS at 08:56

## 2018-12-09 RX ADMIN — CARVEDILOL 3.12 MG: 3.12 TABLET, FILM COATED ORAL at 11:17

## 2018-12-09 RX ADMIN — PROPOFOL 80 MG: 10 INJECTION, EMULSION INTRAVENOUS at 08:47

## 2018-12-09 RX ADMIN — PROPOFOL 20 MG: 10 INJECTION, EMULSION INTRAVENOUS at 08:53

## 2018-12-09 RX ADMIN — CARVEDILOL 3.12 MG: 3.12 TABLET, FILM COATED ORAL at 17:24

## 2018-12-09 RX ADMIN — IPRATROPIUM BROMIDE AND ALBUTEROL SULFATE 1 AMPULE: .5; 3 SOLUTION RESPIRATORY (INHALATION) at 20:09

## 2018-12-09 RX ADMIN — SUCRALFATE 1 G: 1 TABLET ORAL at 13:01

## 2018-12-09 RX ADMIN — PROPOFOL 20 MG: 10 INJECTION, EMULSION INTRAVENOUS at 09:00

## 2018-12-09 ASSESSMENT — PAIN SCALES - GENERAL
PAINLEVEL_OUTOF10: 0

## 2018-12-10 LAB
ANION GAP SERPL CALCULATED.3IONS-SCNC: 16 MMOL/L (ref 7–16)
BUN BLDV-MCNC: 34 MG/DL (ref 8–23)
CALCIUM SERPL-MCNC: 8.9 MG/DL (ref 8.6–10.2)
CHLORIDE BLD-SCNC: 94 MMOL/L (ref 98–107)
CHLORIDE URINE RANDOM: <20 MMOL/L
CO2: 36 MMOL/L (ref 22–29)
CREAT SERPL-MCNC: 2 MG/DL (ref 0.7–1.2)
CREATININE URINE: 147 MG/DL (ref 40–278)
GFR AFRICAN AMERICAN: 40
GFR NON-AFRICAN AMERICAN: 33 ML/MIN/1.73
GLUCOSE BLD-MCNC: 94 MG/DL (ref 74–99)
HCT VFR BLD CALC: 48.6 % (ref 37–54)
HEMOGLOBIN: 14.4 G/DL (ref 12.5–16.5)
MCH RBC QN AUTO: 30.2 PG (ref 26–35)
MCHC RBC AUTO-ENTMCNC: 29.6 % (ref 32–34.5)
MCV RBC AUTO: 101.9 FL (ref 80–99.9)
PDW BLD-RTO: 15.9 FL (ref 11.5–15)
PLATELET # BLD: 189 E9/L (ref 130–450)
PMV BLD AUTO: 11.9 FL (ref 7–12)
POTASSIUM SERPL-SCNC: 4.6 MMOL/L (ref 3.5–5)
POTASSIUM, UR: 36.2 MMOL/L
RBC # BLD: 4.77 E12/L (ref 3.8–5.8)
SODIUM BLD-SCNC: 146 MMOL/L (ref 132–146)
SODIUM URINE: 48 MMOL/L
UREA NITROGEN, UR: 1093 MG/DL (ref 800–1666)
WBC # BLD: 7.4 E9/L (ref 4.5–11.5)

## 2018-12-10 PROCEDURE — 99232 SBSQ HOSP IP/OBS MODERATE 35: CPT | Performed by: SURGERY

## 2018-12-10 PROCEDURE — 6370000000 HC RX 637 (ALT 250 FOR IP): Performed by: INTERNAL MEDICINE

## 2018-12-10 PROCEDURE — 82436 ASSAY OF URINE CHLORIDE: CPT

## 2018-12-10 PROCEDURE — 2700000000 HC OXYGEN THERAPY PER DAY

## 2018-12-10 PROCEDURE — 80048 BASIC METABOLIC PNL TOTAL CA: CPT

## 2018-12-10 PROCEDURE — 36415 COLL VENOUS BLD VENIPUNCTURE: CPT

## 2018-12-10 PROCEDURE — 85027 COMPLETE CBC AUTOMATED: CPT

## 2018-12-10 PROCEDURE — 2060000000 HC ICU INTERMEDIATE R&B

## 2018-12-10 PROCEDURE — 6370000000 HC RX 637 (ALT 250 FOR IP): Performed by: STUDENT IN AN ORGANIZED HEALTH CARE EDUCATION/TRAINING PROGRAM

## 2018-12-10 PROCEDURE — 84133 ASSAY OF URINE POTASSIUM: CPT

## 2018-12-10 PROCEDURE — 99232 SBSQ HOSP IP/OBS MODERATE 35: CPT | Performed by: INTERNAL MEDICINE

## 2018-12-10 PROCEDURE — 94640 AIRWAY INHALATION TREATMENT: CPT

## 2018-12-10 PROCEDURE — 94660 CPAP INITIATION&MGMT: CPT

## 2018-12-10 PROCEDURE — 82570 ASSAY OF URINE CREATININE: CPT

## 2018-12-10 PROCEDURE — 84300 ASSAY OF URINE SODIUM: CPT

## 2018-12-10 PROCEDURE — 84540 ASSAY OF URINE/UREA-N: CPT

## 2018-12-10 PROCEDURE — 6360000002 HC RX W HCPCS: Performed by: INTERNAL MEDICINE

## 2018-12-10 PROCEDURE — 94760 N-INVAS EAR/PLS OXIMETRY 1: CPT

## 2018-12-10 RX ORDER — PANTOPRAZOLE SODIUM 40 MG/1
40 TABLET, DELAYED RELEASE ORAL
Status: DISCONTINUED | OUTPATIENT
Start: 2018-12-10 | End: 2018-12-13 | Stop reason: HOSPADM

## 2018-12-10 RX ORDER — BUMETANIDE 1 MG/1
2 TABLET ORAL DAILY
Status: DISCONTINUED | OUTPATIENT
Start: 2018-12-11 | End: 2018-12-13 | Stop reason: HOSPADM

## 2018-12-10 RX ADMIN — IPRATROPIUM BROMIDE AND ALBUTEROL SULFATE 1 AMPULE: .5; 3 SOLUTION RESPIRATORY (INHALATION) at 08:25

## 2018-12-10 RX ADMIN — Medication: at 08:32

## 2018-12-10 RX ADMIN — BUDESONIDE 250 MCG: 0.25 SUSPENSION RESPIRATORY (INHALATION) at 08:20

## 2018-12-10 RX ADMIN — FORMOTEROL FUMARATE DIHYDRATE 20 MCG: 20 SOLUTION RESPIRATORY (INHALATION) at 08:24

## 2018-12-10 RX ADMIN — PANTOPRAZOLE SODIUM 40 MG: 40 TABLET, DELAYED RELEASE ORAL at 16:44

## 2018-12-10 RX ADMIN — FOLIC ACID 1 MG: 1 TABLET ORAL at 08:31

## 2018-12-10 RX ADMIN — SUCRALFATE 1 G: 1 TABLET ORAL at 12:25

## 2018-12-10 RX ADMIN — SUCRALFATE 1 G: 1 TABLET ORAL at 05:30

## 2018-12-10 RX ADMIN — SUCRALFATE 1 G: 1 TABLET ORAL at 23:10

## 2018-12-10 RX ADMIN — SUCRALFATE 1 G: 1 TABLET ORAL at 17:43

## 2018-12-10 RX ADMIN — CARVEDILOL 3.12 MG: 3.12 TABLET, FILM COATED ORAL at 16:44

## 2018-12-10 RX ADMIN — Medication: at 20:25

## 2018-12-10 RX ADMIN — LEVOTHYROXINE SODIUM 100 MCG: 100 TABLET ORAL at 05:30

## 2018-12-10 RX ADMIN — DOCUSATE SODIUM 100 MG: 100 CAPSULE, LIQUID FILLED ORAL at 08:31

## 2018-12-10 RX ADMIN — PANTOPRAZOLE SODIUM 40 MG: 40 TABLET, DELAYED RELEASE ORAL at 08:31

## 2018-12-10 RX ADMIN — CARVEDILOL 3.12 MG: 3.12 TABLET, FILM COATED ORAL at 08:31

## 2018-12-10 RX ADMIN — DOCUSATE SODIUM 100 MG: 100 CAPSULE, LIQUID FILLED ORAL at 20:25

## 2018-12-10 RX ADMIN — IPRATROPIUM BROMIDE AND ALBUTEROL SULFATE 1 AMPULE: .5; 3 SOLUTION RESPIRATORY (INHALATION) at 17:08

## 2018-12-10 ASSESSMENT — PAIN SCALES - GENERAL
PAINLEVEL_OUTOF10: 0

## 2018-12-10 NOTE — PROGRESS NOTES
Clari Iglesias 476  Internal Medicine Residency / [address]    Attending Physician Statement  I have discussed the case, including pertinent history and exam findings with the resident and the team.  I have seen and examined the patient and the key elements of the encounter have been performed by me. I agree with the assessment, plan and orders as documented by the resident. Case Discussed During AM Rounds   Patient admitted for acute on chronic respiratory failure   Rectal bleeding has resolved    S/P EGD and CSCOPE    Diuresis on hold due to JUDITH on CKD    - 0.5 L since admission    Respiratory status improving    ? Worsening volume concerns    Cardiology initiated evaluation on 12/7- ECHO completed and noted moderate pericardial effusion without signs of tamponade   Oxygen requirements have decreased but diuresis is likely need to be resumed    In addition, if JUDITH on CKD is worsening- Nephrology consultation will be needed   Monitor closely for any signs of decompensation     Acute on chronic hypercapnic/hypoxic respiratory failure    Respiratory status improving    Patient does have home oxygen available at home    Home oxygen level    ECHO completed and reviewed    Cardiology previously following   Likely will need to consider resuming diuresis with stable repeat Cr levels    Monitor I/O's   Continue supplemental oxygen     Gastritis/Duodenitis/Antral ulcers- healing per EGD   Stopped PPI ggt   Started oral PPI BID and carafate    Per Gen surgery- ok to resume [address] in 1 week     Diverticulosis- stable     JUDITH on CKD    Cr stable   Off diuretic currently    Urine studies pending     Moderate Pericardial Effusion    Diuresis currently on hold   Consider resuming at this time     AFIB- holding anticoagulation due to GI bleeding   Per Gen Surgery- ok to resume at 1 week. Remainder of medical problems as per resident note.       Oneta Snellen  Internal Medicine Residency Faculty

## 2018-12-11 ENCOUNTER — APPOINTMENT (OUTPATIENT)
Dept: GENERAL RADIOLOGY | Age: 73
DRG: 189 | End: 2018-12-11
Payer: MEDICARE

## 2018-12-11 LAB
ANION GAP SERPL CALCULATED.3IONS-SCNC: 8 MMOL/L (ref 7–16)
ANION GAP SERPL CALCULATED.3IONS-SCNC: 9 MMOL/L (ref 7–16)
BUN BLDV-MCNC: 26 MG/DL (ref 8–23)
BUN BLDV-MCNC: 27 MG/DL (ref 8–23)
CALCIUM SERPL-MCNC: 8.9 MG/DL (ref 8.6–10.2)
CALCIUM SERPL-MCNC: 9 MG/DL (ref 8.6–10.2)
CHLORIDE BLD-SCNC: 95 MMOL/L (ref 98–107)
CHLORIDE BLD-SCNC: 96 MMOL/L (ref 98–107)
CO2: 39 MMOL/L (ref 22–29)
CO2: 40 MMOL/L (ref 22–29)
CREAT SERPL-MCNC: 1.5 MG/DL (ref 0.7–1.2)
CREAT SERPL-MCNC: 1.6 MG/DL (ref 0.7–1.2)
CREATININE URINE: 176 MG/DL (ref 40–278)
GFR AFRICAN AMERICAN: 52
GFR AFRICAN AMERICAN: 56
GFR NON-AFRICAN AMERICAN: 43 ML/MIN/1.73
GFR NON-AFRICAN AMERICAN: 46 ML/MIN/1.73
GLUCOSE BLD-MCNC: 133 MG/DL (ref 74–99)
GLUCOSE BLD-MCNC: 164 MG/DL (ref 74–99)
HCT VFR BLD CALC: 49.8 % (ref 37–54)
HEMOGLOBIN: 14.4 G/DL (ref 12.5–16.5)
MAGNESIUM: 2.2 MG/DL (ref 1.6–2.6)
MCH RBC QN AUTO: 29.8 PG (ref 26–35)
MCHC RBC AUTO-ENTMCNC: 28.9 % (ref 32–34.5)
MCV RBC AUTO: 102.9 FL (ref 80–99.9)
MICROALBUMIN UR-MCNC: 69.3 MG/L
MICROALBUMIN/CREAT UR-RTO: 39.4 (ref 0–30)
PDW BLD-RTO: 16 FL (ref 11.5–15)
PLATELET # BLD: 177 E9/L (ref 130–450)
PMV BLD AUTO: 11.6 FL (ref 7–12)
POTASSIUM SERPL-SCNC: 4.1 MMOL/L (ref 3.5–5)
POTASSIUM SERPL-SCNC: 4.2 MMOL/L (ref 3.5–5)
PROTEIN PROTEIN: 34 MG/DL (ref 0–12)
PROTEIN/CREAT RATIO: 0.2
PROTEIN/CREAT RATIO: 0.2 (ref 0–0.2)
RBC # BLD: 4.84 E12/L (ref 3.8–5.8)
SODIUM BLD-SCNC: 143 MMOL/L (ref 132–146)
SODIUM BLD-SCNC: 144 MMOL/L (ref 132–146)
WBC # BLD: 6.7 E9/L (ref 4.5–11.5)

## 2018-12-11 PROCEDURE — 82044 UR ALBUMIN SEMIQUANTITATIVE: CPT

## 2018-12-11 PROCEDURE — 6370000000 HC RX 637 (ALT 250 FOR IP): Performed by: STUDENT IN AN ORGANIZED HEALTH CARE EDUCATION/TRAINING PROGRAM

## 2018-12-11 PROCEDURE — 94660 CPAP INITIATION&MGMT: CPT

## 2018-12-11 PROCEDURE — 6360000002 HC RX W HCPCS: Performed by: INTERNAL MEDICINE

## 2018-12-11 PROCEDURE — 83735 ASSAY OF MAGNESIUM: CPT

## 2018-12-11 PROCEDURE — 36415 COLL VENOUS BLD VENIPUNCTURE: CPT

## 2018-12-11 PROCEDURE — 80048 BASIC METABOLIC PNL TOTAL CA: CPT

## 2018-12-11 PROCEDURE — 94640 AIRWAY INHALATION TREATMENT: CPT

## 2018-12-11 PROCEDURE — 82570 ASSAY OF URINE CREATININE: CPT

## 2018-12-11 PROCEDURE — 84156 ASSAY OF PROTEIN URINE: CPT

## 2018-12-11 PROCEDURE — 6370000000 HC RX 637 (ALT 250 FOR IP): Performed by: INTERNAL MEDICINE

## 2018-12-11 PROCEDURE — 2700000000 HC OXYGEN THERAPY PER DAY

## 2018-12-11 PROCEDURE — 2060000000 HC ICU INTERMEDIATE R&B

## 2018-12-11 PROCEDURE — 85027 COMPLETE CBC AUTOMATED: CPT

## 2018-12-11 PROCEDURE — 71046 X-RAY EXAM CHEST 2 VIEWS: CPT

## 2018-12-11 PROCEDURE — 99232 SBSQ HOSP IP/OBS MODERATE 35: CPT | Performed by: INTERNAL MEDICINE

## 2018-12-11 RX ADMIN — Medication: at 09:29

## 2018-12-11 RX ADMIN — BUDESONIDE 250 MCG: 0.25 SUSPENSION RESPIRATORY (INHALATION) at 08:12

## 2018-12-11 RX ADMIN — SUCRALFATE 1 G: 1 TABLET ORAL at 17:16

## 2018-12-11 RX ADMIN — SUCRALFATE 1 G: 1 TABLET ORAL at 06:00

## 2018-12-11 RX ADMIN — DOCUSATE SODIUM 100 MG: 100 CAPSULE, LIQUID FILLED ORAL at 09:29

## 2018-12-11 RX ADMIN — IPRATROPIUM BROMIDE AND ALBUTEROL SULFATE 1 AMPULE: .5; 3 SOLUTION RESPIRATORY (INHALATION) at 08:12

## 2018-12-11 RX ADMIN — CARVEDILOL 3.12 MG: 3.12 TABLET, FILM COATED ORAL at 09:29

## 2018-12-11 RX ADMIN — PANTOPRAZOLE SODIUM 40 MG: 40 TABLET, DELAYED RELEASE ORAL at 16:35

## 2018-12-11 RX ADMIN — IPRATROPIUM BROMIDE AND ALBUTEROL SULFATE 1 AMPULE: .5; 3 SOLUTION RESPIRATORY (INHALATION) at 16:38

## 2018-12-11 RX ADMIN — PANTOPRAZOLE SODIUM 40 MG: 40 TABLET, DELAYED RELEASE ORAL at 06:00

## 2018-12-11 RX ADMIN — BUMETANIDE 2 MG: 1 TABLET ORAL at 09:29

## 2018-12-11 RX ADMIN — SUCRALFATE 1 G: 1 TABLET ORAL at 23:55

## 2018-12-11 RX ADMIN — CARVEDILOL 3.12 MG: 3.12 TABLET, FILM COATED ORAL at 16:35

## 2018-12-11 RX ADMIN — IPRATROPIUM BROMIDE AND ALBUTEROL SULFATE 1 AMPULE: .5; 3 SOLUTION RESPIRATORY (INHALATION) at 12:52

## 2018-12-11 RX ADMIN — DOCUSATE SODIUM 100 MG: 100 CAPSULE, LIQUID FILLED ORAL at 22:04

## 2018-12-11 RX ADMIN — Medication: at 22:03

## 2018-12-11 RX ADMIN — FORMOTEROL FUMARATE DIHYDRATE 20 MCG: 20 SOLUTION RESPIRATORY (INHALATION) at 20:02

## 2018-12-11 RX ADMIN — FOLIC ACID 1 MG: 1 TABLET ORAL at 09:29

## 2018-12-11 RX ADMIN — BUDESONIDE 250 MCG: 0.25 SUSPENSION RESPIRATORY (INHALATION) at 20:02

## 2018-12-11 RX ADMIN — SUCRALFATE 1 G: 1 TABLET ORAL at 12:10

## 2018-12-11 RX ADMIN — LEVOTHYROXINE SODIUM 100 MCG: 100 TABLET ORAL at 06:00

## 2018-12-11 RX ADMIN — IPRATROPIUM BROMIDE AND ALBUTEROL SULFATE 1 AMPULE: .5; 3 SOLUTION RESPIRATORY (INHALATION) at 20:02

## 2018-12-11 RX ADMIN — FORMOTEROL FUMARATE DIHYDRATE 20 MCG: 20 SOLUTION RESPIRATORY (INHALATION) at 08:11

## 2018-12-11 ASSESSMENT — PAIN SCALES - GENERAL
PAINLEVEL_OUTOF10: 0

## 2018-12-11 NOTE — PROGRESS NOTES
Clari Iglesias 476  Internal Medicine Residency / [address]    Attending Physician Statement  I have discussed the case, including pertinent history and exam findings with the resident and the team.  I have seen and examined the patient and the key elements of the encounter have been performed by me. I agree with the assessment, plan and orders as documented by the resident. Case Discussed During AM Rounds   No overnight events   Bumex to be initiated this am   Strict I/O's monitoring needed    Monitor Cr function   Appreciated Nephrology input   Awaiting re-eval by Cardiology in addition    Awaiting AM labs    No further bleeding    Kidney U/S pending     Acute on chronic hypercapnic/hypoxic respiratory failure    Respiratory status improving- close to home oxygen level    ECHO noted previously   Need cardiology reassessment at this time   Diuresis to resume today   Monitor I/O's   Monitor renal function     Gastritis/Duodenitis/Antral ulcers- healing per EGD   Continue oral PPI BID and carafate    Per Gen surgery- ok to resume [facility] in 1 week     JUDITH on CKD Stage III    Low Prot/Cr ratio   Renal U/S pending   Urine lytes to follow    Monitor Cr on Diuresis      Moderate Pericardial Effusion    Diuresis Resumed    ? Repeat ECHO in 4-6 weeks    Cardiology to be contacted for re-assessment and further recommendations     AFIB- holding anticoagulation due to GI bleeding   Per Gen Surgery- ok to resume at 1 week. Currently in AFIB- appears persistent in nature    Currently rate controlled    Disposition- continue inpatient management     Remainder of medical problems as per resident note.       Clara Patterson  Internal Medicine Residency Faculty

## 2018-12-11 NOTE — PROGRESS NOTES
Clari Iglesias 476  Internal Medicine Residency Program  Progress Note - House Team 1    Patient:  Laurel Holland 72 y.o. male MRN: [id number]     Date of Service: 12/11/2018     CC: sob   Overnight events: breathing better     Subjective     The patient was seen and examined. He is doing fine. No SOB. No new complain. Make good urine. Feeling better, belly less distended. No dizziness  Cr 2.0 today       Objective     Physical Exam:  · Vitals: /82   Pulse 94   Temp 97.4 °F (36.3 °C) (Temporal)   Resp 22   Ht 6' (1.829 m)   Wt (!) 303 lb 1.6 oz (137.5 kg)   SpO2 95%   BMI 41.11 kg/m²     I & O - 24hr: I/O this shift:  In: 220 [P.O.:220]  · Out: 25 [Urine:25]   · General Appearance: alert, appears stated age and cooperative  · HEENT:  Head: Normocephalic, no lesions, without obvious abnormality. · Neck: no adenopathy, no carotid bruit, no JVD, supple, symmetrical, trachea midline and thyroid not enlarged, symmetric, no tenderness/mass/nodules, neck is enlarged- difficult to assess JVD  · Lung: Decreased breath sounds in all lung fields, no significant rales appreciated   · Heart: regular rate and rhythm, S1, S2 normal, no murmur, click, rub or gallop  · Abdomen: soft, non-tender; bowel sounds normal; no masses,  no organomegaly  · Extremities:  varicose veins noted, venous stasis dermatitis noted and some edema  · Musculokeletal: No joint swelling, no muscle tenderness. ROM normal in all joints of extremities.    · Neurologic: Mental status: Alert, oriented, thought content appropriate  Subject  Pertinent Labs & Imaging Studies   hipolito  CBC:   Lab Results   Component Value Date    WBC 6.7 12/11/2018    RBC 4.84 12/11/2018    HGB 14.4 12/11/2018    HCT 49.8 12/11/2018    .9 12/11/2018    MCH 29.8 12/11/2018    MCHC 28.9 12/11/2018    RDW 16.0 12/11/2018     12/11/2018    MPV 11.6 12/11/2018     BMP:    Lab Results   Component Value Date     12/11/2018    K 4.2 12/11/2018 CL 96 12/11/2018    CO2 39 12/11/2018    BUN 27 12/11/2018    LABALBU 4.0 12/07/2018    CREATININE 1.5 12/11/2018    CALCIUM 9.0 12/11/2018    GFRAA 56 12/11/2018    LABGLOM 46 12/11/2018    GLUCOSE 133 12/11/2018     PT/INR:    Lab Results   Component Value Date    PROTIME 15.5 12/07/2018    INR 1.3 12/07/2018     PTT:    Lab Results   Component Value Date    APTT 33.3 03/05/2016   [APTT}  U/A:    Lab Results   Component Value Date    COLORU Yellow 12/07/2018    PROTEINU Negative 12/07/2018    PHUR 5.5 12/07/2018    WBCUA NONE 03/04/2016    RBCUA NONE 03/04/2016    BACTERIA NONE 03/04/2016    CLARITYU Clear 12/07/2018    SPECGRAV 1.010 12/07/2018    LEUKOCYTESUR Negative 12/07/2018    UROBILINOGEN 0.2 12/07/2018    BILIRUBINUR Negative 12/07/2018    BLOODU Negative 12/07/2018    GLUCOSEU Negative 12/07/2018       Resident's Assessment and Plan     Laurel Holland is a 72 y.o. male  PMH of CHF, COPD, Sleep apnea, CKD stage 3, HTN, hypothyrodism,  Afib, on Xarelto, presenting for SOB. He noted bright red rectal bleeding 2 days ago, then he stopped taking his medications. Today, he had sob with his routine activity. He denied any orthopnea, palpitation, paroxysmal nocturnal dyspnea. He also denied fever, running nose, being around some sick. No wheezing, coughing. At ER, his BP was stable. He was tachypnea, 22/m, ABG showed acute hypercapnic on chronic acidosis respiratory. He was placed on Bipap. His labs are not significant. WBC/troponin/ wnl. Initial CT Scan showed pericardial effusion, but TTE did not reveal sign of tamponade. Acute hypercapnic respiratory failure, 2/2 COPD exarcebation - stable   Sat 95% on canula   ABG: PCO2 78 , HCO3 35   No wheezing/rales   resp panel negative  - breathing tx      Acute/chronic CHF - stable   Home med: carvedilol 6.25 mg 1/2 tablet bid, Aldactone 25mg, torsemide 20 2pills bid  Allergy to lisinopril, amlodipine. Headache with imdur.    Allergic to amlodipine   Cr 2.1, from 1.9. Urine output 1800+. Negative balance since admission -550.   - resume carvedilol.  - stop aldacton, torsemide due to JUDITH originally during admission. Bumex per Nephrology to be started today. - follow urine output, sob   - cardiology following     Pericardial Effusion  clincically stable, EF no tamponade  - Cardiology following. I called the Dr. Edna Sims office, also left a message to Dr. Reina Araujo for follow-up      Afib - rate controlled    Home med: xarelto   Rate 70-90   - hold xarelto for a week, per noted GS       Lower GI bleeding, likely 2/2 hemorrhoid?, Hx diverticulitis   Vital sign stable  FOBT +  Hb 14   - GS consulted. EGD, colonscopy done. - stop protonix IV. Resume protonix 40mg Bid      Intrinsic JUDITH/CKD stage 3  Cre 2.0, Creatinine 1.8,BASELINE 1.4   FeUrea: 43%. Protein/crea ratio 0.2   - follow BMP daily  - Nephrology consulted: bumex 2mg daily   - follow US  - monitor I/O      Hypothyroidism   Home meds synthroid   TSH 6   - continue synthroid 100 mcg   - will adjust dose later, defer to PCP      Hx of AAA   CT Scan: no aortic dissection.  Ascending Aorta 4x4 cm     PT/OT evaluation ordered   DVT prophylaxis/ GI prophylaxis: PCD/Protonix   Disposition: Jeanette Perry MD, PGY-1  Attending physician: Dr. Alejandrina Shah

## 2018-12-11 NOTE — CARE COORDINATION
CM met with patient to discuss transition of care. Per patient, he walks independently. He wears home o2 intermittently at home. Has a home concentrator. No needs anticipated at time of discharge. Will dc home when medically stable.   He reports his family will provide home going transportation

## 2018-12-11 NOTE — CONSULTS
liver. His creatinine on admission was 1.8 mg/dL and had increase up to 2.1 mg/dL reason for this consultation. Prior to admission his medications included a spironolactone and furosemide. 1. Acute kidney injury on chronic kidney disease, Probably intrinsic JUDITH Due to contrast-induced nephropathy   2. Mild hypernatremia with hypervolemia due to chronic heart failure  3. HFpEF 60%, with probably decompensation  4. Hypercapnic respiratory failure, secondary to #3  5. Obstructive sleep apnea  6. Hypertension, on carvedilol  7. Atrial fibrillation, on carvedilol, anticoagulation on hold  8. Pericardial effusion  9. Ascites, probably due to #3  10.  Fatty liver    Plan:    · Restart loop diuretics, Bumex 2 mg daily  · Monitor renal function  · Obtain urine protein creatinine and albumin creatinine ratio  · Obtain kidney ultrasound with duplex    Thank you very much Dr. Khadar Bar for allowing us to participate in the care of .

## 2018-12-12 ENCOUNTER — APPOINTMENT (OUTPATIENT)
Dept: ULTRASOUND IMAGING | Age: 73
DRG: 189 | End: 2018-12-12
Payer: MEDICARE

## 2018-12-12 LAB
ANION GAP SERPL CALCULATED.3IONS-SCNC: 13 MMOL/L (ref 7–16)
BUN BLDV-MCNC: 23 MG/DL (ref 8–23)
CALCIUM SERPL-MCNC: 8.9 MG/DL (ref 8.6–10.2)
CHLORIDE BLD-SCNC: 95 MMOL/L (ref 98–107)
CO2: 37 MMOL/L (ref 22–29)
CREAT SERPL-MCNC: 1.5 MG/DL (ref 0.7–1.2)
CREATININE URINE: 71 MG/DL (ref 40–278)
GFR AFRICAN AMERICAN: 56
GFR NON-AFRICAN AMERICAN: 46 ML/MIN/1.73
GLUCOSE BLD-MCNC: 78 MG/DL (ref 74–99)
HCT VFR BLD CALC: 49.3 % (ref 37–54)
HEMOGLOBIN: 14.1 G/DL (ref 12.5–16.5)
MAGNESIUM: 2.3 MG/DL (ref 1.6–2.6)
MCH RBC QN AUTO: 29.4 PG (ref 26–35)
MCHC RBC AUTO-ENTMCNC: 28.6 % (ref 32–34.5)
MCV RBC AUTO: 102.9 FL (ref 80–99.9)
PDW BLD-RTO: 15.8 FL (ref 11.5–15)
PLATELET # BLD: 158 E9/L (ref 130–450)
PMV BLD AUTO: 11.8 FL (ref 7–12)
POTASSIUM SERPL-SCNC: 4.1 MMOL/L (ref 3.5–5)
PROTEIN/CREAT RATIO: 0.8
PROTEIN/CREAT RATIO: 0.8 (ref 0–0.2)
RBC # BLD: 4.79 E12/L (ref 3.8–5.8)
SODIUM BLD-SCNC: 145 MMOL/L (ref 132–146)
WBC # BLD: 5.9 E9/L (ref 4.5–11.5)

## 2018-12-12 PROCEDURE — 94640 AIRWAY INHALATION TREATMENT: CPT

## 2018-12-12 PROCEDURE — 6370000000 HC RX 637 (ALT 250 FOR IP): Performed by: STUDENT IN AN ORGANIZED HEALTH CARE EDUCATION/TRAINING PROGRAM

## 2018-12-12 PROCEDURE — 84156 ASSAY OF PROTEIN URINE: CPT

## 2018-12-12 PROCEDURE — 84166 PROTEIN E-PHORESIS/URINE/CSF: CPT

## 2018-12-12 PROCEDURE — 6370000000 HC RX 637 (ALT 250 FOR IP): Performed by: INTERNAL MEDICINE

## 2018-12-12 PROCEDURE — 36415 COLL VENOUS BLD VENIPUNCTURE: CPT

## 2018-12-12 PROCEDURE — 83735 ASSAY OF MAGNESIUM: CPT

## 2018-12-12 PROCEDURE — 97530 THERAPEUTIC ACTIVITIES: CPT

## 2018-12-12 PROCEDURE — G8978 MOBILITY CURRENT STATUS: HCPCS

## 2018-12-12 PROCEDURE — G8979 MOBILITY GOAL STATUS: HCPCS

## 2018-12-12 PROCEDURE — 6360000002 HC RX W HCPCS: Performed by: INTERNAL MEDICINE

## 2018-12-12 PROCEDURE — 2060000000 HC ICU INTERMEDIATE R&B

## 2018-12-12 PROCEDURE — 85027 COMPLETE CBC AUTOMATED: CPT

## 2018-12-12 PROCEDURE — 82570 ASSAY OF URINE CREATININE: CPT

## 2018-12-12 PROCEDURE — 97161 PT EVAL LOW COMPLEX 20 MIN: CPT

## 2018-12-12 PROCEDURE — 2700000000 HC OXYGEN THERAPY PER DAY

## 2018-12-12 PROCEDURE — 84165 PROTEIN E-PHORESIS SERUM: CPT

## 2018-12-12 PROCEDURE — 76770 US EXAM ABDO BACK WALL COMP: CPT

## 2018-12-12 PROCEDURE — 80048 BASIC METABOLIC PNL TOTAL CA: CPT

## 2018-12-12 PROCEDURE — 99232 SBSQ HOSP IP/OBS MODERATE 35: CPT | Performed by: INTERNAL MEDICINE

## 2018-12-12 RX ORDER — SENNA PLUS 8.6 MG/1
2 TABLET ORAL ONCE
Status: COMPLETED | OUTPATIENT
Start: 2018-12-12 | End: 2018-12-12

## 2018-12-12 RX ORDER — ACETAZOLAMIDE 250 MG/1
125 TABLET ORAL DAILY
Status: DISCONTINUED | OUTPATIENT
Start: 2018-12-12 | End: 2018-12-13 | Stop reason: HOSPADM

## 2018-12-12 RX ADMIN — DOCUSATE SODIUM 100 MG: 100 CAPSULE, LIQUID FILLED ORAL at 09:25

## 2018-12-12 RX ADMIN — BUDESONIDE 250 MCG: 0.25 SUSPENSION RESPIRATORY (INHALATION) at 20:32

## 2018-12-12 RX ADMIN — BUMETANIDE 2 MG: 1 TABLET ORAL at 09:24

## 2018-12-12 RX ADMIN — FORMOTEROL FUMARATE DIHYDRATE 20 MCG: 20 SOLUTION RESPIRATORY (INHALATION) at 20:31

## 2018-12-12 RX ADMIN — ACETAZOLAMIDE 125 MG: 250 TABLET ORAL at 18:17

## 2018-12-12 RX ADMIN — IPRATROPIUM BROMIDE AND ALBUTEROL SULFATE 1 AMPULE: .5; 3 SOLUTION RESPIRATORY (INHALATION) at 15:47

## 2018-12-12 RX ADMIN — PANTOPRAZOLE SODIUM 40 MG: 40 TABLET, DELAYED RELEASE ORAL at 06:19

## 2018-12-12 RX ADMIN — IPRATROPIUM BROMIDE AND ALBUTEROL SULFATE 1 AMPULE: .5; 3 SOLUTION RESPIRATORY (INHALATION) at 11:12

## 2018-12-12 RX ADMIN — LEVOTHYROXINE SODIUM 100 MCG: 100 TABLET ORAL at 06:18

## 2018-12-12 RX ADMIN — SUCRALFATE 1 G: 1 TABLET ORAL at 06:19

## 2018-12-12 RX ADMIN — CARVEDILOL 3.12 MG: 3.12 TABLET, FILM COATED ORAL at 18:17

## 2018-12-12 RX ADMIN — Medication: at 20:08

## 2018-12-12 RX ADMIN — Medication: at 09:25

## 2018-12-12 RX ADMIN — CARVEDILOL 3.12 MG: 3.12 TABLET, FILM COATED ORAL at 09:25

## 2018-12-12 RX ADMIN — SUCRALFATE 1 G: 1 TABLET ORAL at 22:50

## 2018-12-12 RX ADMIN — SENNOSIDES 17.2 MG: 8.6 TABLET, FILM COATED ORAL at 12:58

## 2018-12-12 RX ADMIN — FOLIC ACID 1 MG: 1 TABLET ORAL at 09:25

## 2018-12-12 RX ADMIN — SUCRALFATE 1 G: 1 TABLET ORAL at 18:17

## 2018-12-12 RX ADMIN — SUCRALFATE 1 G: 1 TABLET ORAL at 11:14

## 2018-12-12 RX ADMIN — BUDESONIDE 250 MCG: 0.25 SUSPENSION RESPIRATORY (INHALATION) at 07:32

## 2018-12-12 RX ADMIN — FORMOTEROL FUMARATE DIHYDRATE 20 MCG: 20 SOLUTION RESPIRATORY (INHALATION) at 07:32

## 2018-12-12 RX ADMIN — PANTOPRAZOLE SODIUM 40 MG: 40 TABLET, DELAYED RELEASE ORAL at 18:17

## 2018-12-12 ASSESSMENT — PAIN SCALES - GENERAL
PAINLEVEL_OUTOF10: 0

## 2018-12-12 NOTE — PROGRESS NOTES
Remainder of medical problems as per resident note.       Clara Patterson  Internal Medicine Residency Faculty

## 2018-12-12 NOTE — PROGRESS NOTES
amlodipine. Headache with imdur. Allergic to amlodipine   Initial JUDITH- stopped diuresis. Bumex initiated with Nephrology following given signs of volume overload with noted pericardial effusion.   - 3 L since admission with recent addition of diuresis  Patient feeling much improved symptomatically  Cr at baseline 1.5-1.6 this am     Pericardial Effusion - stable   clincically stable, EF 50% on GDMT-  No signs of  tamponade  - Cardiology following. Dr. Manpreet James re-reviewed ECHO and discussed mild/mod effusion without signs of tamponade- recommendations for continuation of diuresis.      Afib - rate controlled    Home med: xarelto   Rate 70-90   - hold xarelto for a week, per noted GS       Lower GI bleeding, likely 2/2 hemorrhoid?, Hx diverticulosis -stable   Vital sign stable  FOBT +  Hb 14   - GS consulted. EGD, colonscopy done. - stop protonix IV. Resume protonix 40mg Bid      Intrinsic JUDITH/CKD stage 3 - resolved   Cre 1.5, Creatinine 1.8,BASELINE 1.4   - follow BMP daily  - Nephrology consulted: bumex 2mg daily, ACTZ 125 x3 days    - follow US  - monitor I/O      Hypothyroidism   Home meds synthroid   TSH 6   - continue synthroid 100 mcg   - will adjust dose later, defer to PCP      Hx of AAA   CT Scan: no aortic dissection.  Ascending Aorta 4x4 cm     PT/OT evaluation ordered   DVT prophylaxis/ GI prophylaxis: PCD/Protonix   Disposition: Lizzette Zuñiga MD, PGY-1  Attending physician: Dr. Temo Flowers

## 2018-12-13 VITALS
BODY MASS INDEX: 40.41 KG/M2 | HEART RATE: 82 BPM | OXYGEN SATURATION: 94 % | HEIGHT: 72 IN | DIASTOLIC BLOOD PRESSURE: 86 MMHG | WEIGHT: 298.38 LBS | SYSTOLIC BLOOD PRESSURE: 140 MMHG | TEMPERATURE: 97 F | RESPIRATION RATE: 20 BRPM

## 2018-12-13 LAB
ANION GAP SERPL CALCULATED.3IONS-SCNC: 14 MMOL/L (ref 7–16)
BUN BLDV-MCNC: 21 MG/DL (ref 8–23)
CALCIUM SERPL-MCNC: 9.6 MG/DL (ref 8.6–10.2)
CHLORIDE BLD-SCNC: 93 MMOL/L (ref 98–107)
CO2: 36 MMOL/L (ref 22–29)
CREAT SERPL-MCNC: 1.6 MG/DL (ref 0.7–1.2)
GFR AFRICAN AMERICAN: 52
GFR NON-AFRICAN AMERICAN: 43 ML/MIN/1.73
GLUCOSE BLD-MCNC: 88 MG/DL (ref 74–99)
HCT VFR BLD CALC: 51.3 % (ref 37–54)
HELICOBACTER PYLORI IGG: NORMAL
HEMOGLOBIN: 14.7 G/DL (ref 12.5–16.5)
MCH RBC QN AUTO: 29.5 PG (ref 26–35)
MCHC RBC AUTO-ENTMCNC: 28.7 % (ref 32–34.5)
MCV RBC AUTO: 103 FL (ref 80–99.9)
PDW BLD-RTO: 15.9 FL (ref 11.5–15)
PLATELET # BLD: 181 E9/L (ref 130–450)
PMV BLD AUTO: 12 FL (ref 7–12)
POTASSIUM SERPL-SCNC: 4.5 MMOL/L (ref 3.5–5)
RBC # BLD: 4.98 E12/L (ref 3.8–5.8)
SODIUM BLD-SCNC: 143 MMOL/L (ref 132–146)
WBC # BLD: 7 E9/L (ref 4.5–11.5)

## 2018-12-13 PROCEDURE — 6370000000 HC RX 637 (ALT 250 FOR IP): Performed by: INTERNAL MEDICINE

## 2018-12-13 PROCEDURE — 99232 SBSQ HOSP IP/OBS MODERATE 35: CPT | Performed by: INTERNAL MEDICINE

## 2018-12-13 PROCEDURE — 94640 AIRWAY INHALATION TREATMENT: CPT

## 2018-12-13 PROCEDURE — 36415 COLL VENOUS BLD VENIPUNCTURE: CPT

## 2018-12-13 PROCEDURE — 80048 BASIC METABOLIC PNL TOTAL CA: CPT

## 2018-12-13 PROCEDURE — 85027 COMPLETE CBC AUTOMATED: CPT

## 2018-12-13 PROCEDURE — 6370000000 HC RX 637 (ALT 250 FOR IP): Performed by: STUDENT IN AN ORGANIZED HEALTH CARE EDUCATION/TRAINING PROGRAM

## 2018-12-13 PROCEDURE — 6360000002 HC RX W HCPCS: Performed by: INTERNAL MEDICINE

## 2018-12-13 PROCEDURE — 97530 THERAPEUTIC ACTIVITIES: CPT

## 2018-12-13 PROCEDURE — 2700000000 HC OXYGEN THERAPY PER DAY

## 2018-12-13 RX ORDER — PANTOPRAZOLE SODIUM 40 MG/1
40 TABLET, DELAYED RELEASE ORAL
Qty: 30 TABLET | Refills: 3 | Status: SHIPPED | OUTPATIENT
Start: 2018-12-13 | End: 2022-05-02

## 2018-12-13 RX ORDER — SUCRALFATE 1 G/1
1 TABLET ORAL 3 TIMES DAILY
Qty: 120 TABLET | Refills: 0 | Status: SHIPPED | OUTPATIENT
Start: 2018-12-13 | End: 2019-01-12

## 2018-12-13 RX ORDER — BUMETANIDE 2 MG/1
2 TABLET ORAL DAILY
Qty: 30 TABLET | Refills: 1 | Status: SHIPPED | OUTPATIENT
Start: 2018-12-14 | End: 2022-05-02

## 2018-12-13 RX ORDER — FOLIC ACID 1 MG/1
1 TABLET ORAL DAILY
Qty: 30 TABLET | Refills: 3 | Status: SHIPPED | OUTPATIENT
Start: 2018-12-14 | End: 2022-05-02

## 2018-12-13 RX ORDER — CARVEDILOL 3.12 MG/1
3.12 TABLET ORAL 2 TIMES DAILY WITH MEALS
Qty: 60 TABLET | Refills: 3 | Status: ON HOLD | OUTPATIENT
Start: 2018-12-13 | End: 2020-06-27 | Stop reason: HOSPADM

## 2018-12-13 RX ADMIN — CARVEDILOL 3.12 MG: 3.12 TABLET, FILM COATED ORAL at 17:21

## 2018-12-13 RX ADMIN — CARVEDILOL 3.12 MG: 3.12 TABLET, FILM COATED ORAL at 09:27

## 2018-12-13 RX ADMIN — ACETAZOLAMIDE 125 MG: 250 TABLET ORAL at 09:27

## 2018-12-13 RX ADMIN — DOCUSATE SODIUM 100 MG: 100 CAPSULE, LIQUID FILLED ORAL at 09:27

## 2018-12-13 RX ADMIN — SUCRALFATE 1 G: 1 TABLET ORAL at 06:10

## 2018-12-13 RX ADMIN — IPRATROPIUM BROMIDE AND ALBUTEROL SULFATE 1 AMPULE: .5; 3 SOLUTION RESPIRATORY (INHALATION) at 11:27

## 2018-12-13 RX ADMIN — PANTOPRAZOLE SODIUM 40 MG: 40 TABLET, DELAYED RELEASE ORAL at 06:10

## 2018-12-13 RX ADMIN — IPRATROPIUM BROMIDE AND ALBUTEROL SULFATE 1 AMPULE: .5; 3 SOLUTION RESPIRATORY (INHALATION) at 07:49

## 2018-12-13 RX ADMIN — Medication: at 09:28

## 2018-12-13 RX ADMIN — BUMETANIDE 2 MG: 1 TABLET ORAL at 09:27

## 2018-12-13 RX ADMIN — PANTOPRAZOLE SODIUM 40 MG: 40 TABLET, DELAYED RELEASE ORAL at 17:21

## 2018-12-13 RX ADMIN — BUDESONIDE 250 MCG: 0.25 SUSPENSION RESPIRATORY (INHALATION) at 07:45

## 2018-12-13 RX ADMIN — FOLIC ACID 1 MG: 1 TABLET ORAL at 09:27

## 2018-12-13 RX ADMIN — SUCRALFATE 1 G: 1 TABLET ORAL at 12:00

## 2018-12-13 RX ADMIN — SUCRALFATE 1 G: 1 TABLET ORAL at 17:21

## 2018-12-13 RX ADMIN — FORMOTEROL FUMARATE DIHYDRATE 20 MCG: 20 SOLUTION RESPIRATORY (INHALATION) at 07:48

## 2018-12-13 RX ADMIN — IPRATROPIUM BROMIDE AND ALBUTEROL SULFATE 1 AMPULE: .5; 3 SOLUTION RESPIRATORY (INHALATION) at 15:41

## 2018-12-13 RX ADMIN — LEVOTHYROXINE SODIUM 100 MCG: 100 TABLET ORAL at 06:10

## 2018-12-13 ASSESSMENT — PAIN SCALES - GENERAL
PAINLEVEL_OUTOF10: 0

## 2018-12-13 NOTE — DISCHARGE SUMMARY
colonoscopy     Condition at discharge: Stable    Disposition: home    Discharge Medications:  Current Discharge Medication List      START taking these medications    Details   carvedilol (COREG) 3.125 MG tablet Take 1 tablet by mouth 2 times daily (with meals)  Qty: 60 tablet, Refills: 3      bumetanide (BUMEX) 2 MG tablet Take 1 tablet by mouth daily  Qty: 30 tablet, Refills: 1      pantoprazole (PROTONIX) 40 MG tablet Take 1 tablet by mouth 2 times daily (before meals)  Qty: 30 tablet, Refills: 3      sucralfate (CARAFATE) 1 GM tablet Take 1 tablet by mouth three times daily  Qty: 120 tablet, Refills: 0      folic acid (FOLVITE) 1 MG tablet Take 1 tablet by mouth daily  Qty: 30 tablet, Refills: 3         CONTINUE these medications which have NOT CHANGED    Details   acetaminophen (TYLENOL) 500 MG tablet Take 1 tablet by mouth every 6 hours as needed  Qty: 120 tablet, Refills: 3      terazosin (HYTRIN) 2 MG capsule Take 1 capsule by mouth nightly Take 2 tablets at bedtime  Qty: 30 capsule, Refills: 3      amLODIPine (NORVASC) 5 MG tablet Take 1 tablet by mouth daily  Qty: 30 tablet, Refills: 3      miconazole nitrate 2 % OINT Apply topically 2 times daily  Refills: 0      docusate sodium (COLACE, DULCOLAX) 100 MG CAPS Take 100 mg by mouth 2 times daily      magnesium hydroxide (MILK OF MAGNESIA) 400 MG/5ML suspension Take 30 mLs by mouth daily as needed for Constipation      levothyroxine (SYNTHROID) 100 MCG tablet Take 1 tablet by mouth Daily  Qty: 30 tablet, Refills: 3      spironolactone (ALDACTONE) 25 MG tablet Take 25 mg by mouth daily Take 2 tablets daily         STOP taking these medications       Heparin Sodium, Porcine, (HEPARIN, PORCINE,) 5000 UNIT/ML injection Comments:   Reason for Stopping:         promethazine (PHENERGAN) 25 MG/ML injection Comments:   Reason for Stopping:         atenolol (TENORMIN) 25 MG tablet Comments:   Reason for Stopping:         furosemide (LASIX) 40 MG tablet Comments: Reason for Stopping:         pantoprazole sodium (PROTONIX) 40 MG PACK packet Comments:   Reason for Stopping:           Activity as tolerated    Be compliant with your medications and take them as prescribed. Diet: low sodium    Special Instructions:   1. Please take medications as prescribed. Resume Xarelto on 12/16/2018. 2. Please see your PCP, Dr. Jett Gillis, within 7 days after discharge.        Jaziel Douglas MD  PGY 1   2:39 PM 12/13/2018   Attending: Dr. Jennifer Young

## 2018-12-13 NOTE — PROGRESS NOTES
Physical Therapy    Facility/Department: [address] PICU  Rx. Note    NAME: Hong Paul  : 1945  MRN: [id number]    Date of Service: 2018    Evaluating Therapist: Mariaa Bauman PT, DPT    Room #: 1901O  DIAGNOSIS: Acute respiratory failure with hypoxia  PRECAUTIONS: Falls, [place], O2    Social:  Pt lives alone in a 2 floor plan with 2 step(s) and 1 rail(s) to enter. Bedroom/bathroom on 1st floor. Basement laundry with flight and 1 rail. Prior to admission pt walked with no AD. Reported independent with ADLs/IADLs. Has family near to assist as needed. Initial Evaluation  Date: 18 Treatment  Date:  See above   Short Term/ Long Term   Goals   Was pt agreeable to Eval/treatment? Yes  yes    Does pt have pain? Reported 6/10 R knee pain. RN aware. Right knee pain    Bed Mobility  Rolling: Independent  Supine to sit: Independent  Sit to supine: Independent  Scooting: Independent  independent with bed mobility NA   Transfers Sit to stand: Supervision  Stand to sit: Supervision  Stand pivot: Supervision Sit <> stand independent  Stand pivot no device - independent Independent   Ambulation    225 feet with no AD with Supervision 250 feet no device independent >300 feet with no AD Independently   Stair negotiation: ascended and descended  10 steps with 1 rail with SBA  4 steps 1 rail supervision 10 steps with 1 rail with Mod I   AM-PAC Score         Pt is alert and oriented x 3  BLE ROM is nt  BLE Strength is grossly 5/5  Balance: good    Pt performed therapeutic exercise of the following: function    Patient education  Pt was educated on  Safety on steps and proper sequence with step to gait    Patient response to education:   Pt verbalized understanding Pt demonstrated skill Pt requires further education in this area   x x no     Additional Comments:  Pt overall did well pulse ox in 90's during gait on 4 liters of 02  Verbally reviewed car transfer technique and overall safety.   Pt to be discharged today    Pt was left in bed with call light left by patient. Chair/bed alarm: nt    Time in: 1500   Time out: 1515    Pt is making good progress toward established Physical Therapy goals. Continue with physical therapy current plan of care. Aravind Horn P.T. DAPHNE.   License Number: [id number]

## 2018-12-13 NOTE — PROGRESS NOTES
Department of Internal Medicine  Nephrology Attending Progress Note        SUBJECTIVE:  We are following this patient for ak,i on ckd     Improving consistently / overall stable     Physical Exam:    VITALS:  BP (!) 140/86   Pulse 82   Temp 97 °F (36.1 °C) (Temporal)   Resp 20   Ht 6' (1.829 m)   Wt 298 lb 6 oz (135.3 kg)   SpO2 94%   BMI 40.47 kg/m²   PULSE OXIMETRY RANGE: SpO2  Av %  Min: 94 %  Max: 98 %  24HR INTAKE/OUTPUT:      Intake/Output Summary (Last 24 hours) at 18 1105  Last data filed at 18 0835   Gross per 24 hour   Intake              900 ml   Output             3075 ml   Net            -2175 ml       GENERAL: Mild distress, weak, obese   EYES: no pallor, no icterus   NOSE EAR THROAT: no ulcers, no rashes, no drainage   NECK: thyroid not palpable, LN not appreciated   RESP: air entry b/l ,bases decreased   CVS: S1 S2 heard, irregular at times, 2/6 esm   GI: soft, distended non tender, no organomegaly appreciated, BS +  MS/ Ext: mild to mod edema, mostly chronic changes  Pulses equal.       DATA:    CBC:   Lab Results   Component Value Date    WBC 7.0 2018    RBC 4.98 2018    HGB 14.7 2018    HCT 51.3 2018    .0 2018    MCH 29.5 2018    MCHC 28.7 2018    RDW 15.9 2018     2018    MPV 12.0 2018     CMP:    Lab Results   Component Value Date     2018    K 4.5 2018    CL 93 2018    CO2 36 2018    BUN 21 2018    CREATININE 1.6 2018    GFRAA 52 2018    LABGLOM 43 2018    GLUCOSE 88 2018    PROT 7.5 2018    LABALBU 4.0 2018    CALCIUM 9.6 2018    BILITOT 2.1 2018    ALKPHOS 73 2018    AST 28 2018    ALT 30 2018       IMPRESSION/RECOMMENDATIONS:      Briefly Mr. René Landin is a very pleasant 81-year-old  man with history of chronic kidney disease is stage III baseline creatinine of 1.4-1.6 mg/dl, hypertension,

## 2018-12-14 LAB
ADDENDUM ELECTROPHORESIS URINE RANDOM: ABNORMAL
ALBUMIN SERPL-MCNC: 3.5 G/DL (ref 3.5–4.7)
ALPHA-1-GLOBULIN: 0.3 G/DL (ref 0.2–0.4)
ALPHA-2-GLOBULIN: 0.6 G/FL (ref 0.5–1)
BETA GLOBULIN: 1.3 G/DL (ref 0.8–1.3)
ELECTROPHORESIS: NORMAL
GAMMA GLOBULIN: 1.5 G/DL (ref 0.7–1.6)
PROTEIN PROTEIN: 60 MG/DL (ref 0–12)
TOTAL PROTEIN: 7.3 G/DL (ref 6.4–8.3)

## 2018-12-19 ENCOUNTER — TELEPHONE (OUTPATIENT)
Dept: CARDIOLOGY CLINIC | Age: 73
End: 2018-12-19

## 2019-01-03 LAB
EKG ATRIAL RATE: 138 BPM
EKG Q-T INTERVAL: 348 MS
EKG QRS DURATION: 84 MS
EKG QTC CALCULATION (BAZETT): 404 MS
EKG R AXIS: 150 DEGREES
EKG T AXIS: -25 DEGREES
EKG VENTRICULAR RATE: 81 BPM

## 2020-06-19 ENCOUNTER — HOSPITAL ENCOUNTER (INPATIENT)
Age: 75
LOS: 8 days | Discharge: HOME | DRG: 291 | End: 2020-06-27
Attending: EMERGENCY MEDICINE | Admitting: FAMILY MEDICINE
Payer: MEDICARE

## 2020-06-19 ENCOUNTER — APPOINTMENT (OUTPATIENT)
Dept: GENERAL RADIOLOGY | Age: 75
DRG: 291 | End: 2020-06-19
Payer: MEDICARE

## 2020-06-19 PROBLEM — J96.20 ACUTE ON CHRONIC RESPIRATORY FAILURE (HCC): Status: ACTIVE | Noted: 2020-06-19

## 2020-06-19 LAB
AADO2: 363.5 MMHG
AADO2: 94.9 MMHG
ALBUMIN SERPL-MCNC: 3.7 G/DL (ref 3.5–5.2)
ALP BLD-CCNC: 109 U/L (ref 40–129)
ALT SERPL-CCNC: 33 U/L (ref 0–40)
ANION GAP SERPL CALCULATED.3IONS-SCNC: 10 MMOL/L (ref 7–16)
ANION GAP SERPL CALCULATED.3IONS-SCNC: 9 MMOL/L (ref 7–16)
ANISOCYTOSIS: ABNORMAL
AST SERPL-CCNC: 41 U/L (ref 0–39)
B.E.: 10.1 MMOL/L (ref -3–3)
B.E.: 10.2 MMOL/L (ref -3–3)
B.E.: 2.5 MMOL/L (ref -3–3)
BASOPHILS ABSOLUTE: 0.21 E9/L (ref 0–0.2)
BASOPHILS RELATIVE PERCENT: 2.6 % (ref 0–2)
BILIRUB SERPL-MCNC: 1.6 MG/DL (ref 0–1.2)
BUN BLDV-MCNC: 24 MG/DL (ref 8–23)
BUN BLDV-MCNC: 32 MG/DL (ref 8–23)
CALCIUM SERPL-MCNC: 9.6 MG/DL (ref 8.6–10.2)
CALCIUM SERPL-MCNC: 9.9 MG/DL (ref 8.6–10.2)
CHLORIDE BLD-SCNC: 94 MMOL/L (ref 98–107)
CHLORIDE BLD-SCNC: 95 MMOL/L (ref 98–107)
CO2: 37 MMOL/L (ref 22–29)
CO2: 40 MMOL/L (ref 22–29)
COHB: 1.1 % (ref 0–1.5)
COHB: 1.5 % (ref 0–1.5)
COHB: 1.7 % (ref 0–1.5)
CREAT SERPL-MCNC: 1.2 MG/DL (ref 0.7–1.2)
CREAT SERPL-MCNC: 1.3 MG/DL (ref 0.7–1.2)
CRITICAL: ABNORMAL
DATE ANALYZED: ABNORMAL
DATE OF COLLECTION: ABNORMAL
EKG ATRIAL RATE: 129 BPM
EKG Q-T INTERVAL: 322 MS
EKG QRS DURATION: 98 MS
EKG QTC CALCULATION (BAZETT): 458 MS
EKG R AXIS: 121 DEGREES
EKG T AXIS: -49 DEGREES
EKG VENTRICULAR RATE: 122 BPM
EOSINOPHILS ABSOLUTE: 0.21 E9/L (ref 0.05–0.5)
EOSINOPHILS RELATIVE PERCENT: 2.6 % (ref 0–6)
FIO2: 100 %
FIO2: 40 %
GFR AFRICAN AMERICAN: >60
GFR AFRICAN AMERICAN: >60
GFR NON-AFRICAN AMERICAN: 54 ML/MIN/1.73
GFR NON-AFRICAN AMERICAN: 59 ML/MIN/1.73
GLUCOSE BLD-MCNC: 108 MG/DL (ref 74–99)
GLUCOSE BLD-MCNC: 148 MG/DL (ref 74–99)
HCO3: 35.6 MMOL/L (ref 22–26)
HCO3: 40.9 MMOL/L (ref 22–26)
HCO3: 45.4 MMOL/L (ref 22–26)
HCT VFR BLD CALC: 53.7 % (ref 37–54)
HEMOGLOBIN: 15.1 G/DL (ref 12.5–16.5)
HHB: 1.1 % (ref 0–5)
HHB: 1.2 % (ref 0–5)
HHB: 4.3 % (ref 0–5)
LAB: ABNORMAL
LACTIC ACID, SEPSIS: 1.2 MMOL/L (ref 0.5–1.9)
LV EF: 55 %
LVEF MODALITY: NORMAL
LYMPHOCYTES ABSOLUTE: 0.24 E9/L (ref 1.5–4)
LYMPHOCYTES RELATIVE PERCENT: 2.6 % (ref 20–42)
Lab: ABNORMAL
MCH RBC QN AUTO: 29.4 PG (ref 26–35)
MCHC RBC AUTO-ENTMCNC: 28.1 % (ref 32–34.5)
MCV RBC AUTO: 104.7 FL (ref 80–99.9)
METAMYELOCYTES RELATIVE PERCENT: 0.9 % (ref 0–1)
METHB: 0.3 % (ref 0–1.5)
METHB: 0.3 % (ref 0–1.5)
METHB: 0.4 % (ref 0–1.5)
MODE: ABNORMAL
MONOCYTES ABSOLUTE: 0.32 E9/L (ref 0.1–0.95)
MONOCYTES RELATIVE PERCENT: 4.3 % (ref 2–12)
NEUTROPHILS ABSOLUTE: 7.04 E9/L (ref 1.8–7.3)
NEUTROPHILS RELATIVE PERCENT: 87 % (ref 43–80)
O2 CONTENT: 20.6 ML/DL
O2 CONTENT: 21.7 ML/DL
O2 CONTENT: 22.1 ML/DL
O2 SATURATION: 95.6 % (ref 92–98.5)
O2 SATURATION: 98.8 % (ref 92–98.5)
O2 SATURATION: 98.9 % (ref 92–98.5)
O2HB: 93.7 % (ref 94–97)
O2HB: 97 % (ref 94–97)
O2HB: 97.4 % (ref 94–97)
OPERATOR ID: 2577
OPERATOR ID: 882
OPERATOR ID: ABNORMAL
OVALOCYTES: ABNORMAL
PATIENT TEMP: 37 C
PCO2: 105.4 MMHG (ref 35–45)
PCO2: 133.3 MMHG (ref 35–45)
PCO2: 85.5 MMHG (ref 35–45)
PDW BLD-RTO: 17.2 FL (ref 11.5–15)
PEEP/CPAP: 8 CMH2O
PEEP/CPAP: 8 CMH2O
PFO2: 1.91 MMHG/%
PFO2: 2.05 MMHG/%
PH BLOOD GAS: 7.15 (ref 7.35–7.45)
PH BLOOD GAS: 7.15 (ref 7.35–7.45)
PH BLOOD GAS: 7.3 (ref 7.35–7.45)
PLATELET # BLD: 171 E9/L (ref 130–450)
PMV BLD AUTO: 11.5 FL (ref 7–12)
PO2: 176.2 MMHG (ref 75–100)
PO2: 191.2 MMHG (ref 75–100)
PO2: 82 MMHG (ref 75–100)
POIKILOCYTES: ABNORMAL
POLYCHROMASIA: ABNORMAL
POTASSIUM SERPL-SCNC: 4.6 MMOL/L (ref 3.5–5)
POTASSIUM SERPL-SCNC: 4.9 MMOL/L (ref 3.5–5)
POTASSIUM SERPL-SCNC: 4.92 MMOL/L (ref 3.5–5)
PRO-BNP: 5433 PG/ML (ref 0–125)
PROCALCITONIN: 0.18 NG/ML (ref 0–0.08)
RBC # BLD: 5.13 E12/L (ref 3.8–5.8)
RI(T): 1.16
RI(T): 190 %
RR MECHANICAL: 16 B/MIN
RR MECHANICAL: 20 B/MIN
SARS-COV-2, NAAT: NOT DETECTED
SODIUM BLD-SCNC: 141 MMOL/L (ref 132–146)
SODIUM BLD-SCNC: 144 MMOL/L (ref 132–146)
SOURCE, BLOOD GAS: ABNORMAL
THB: 15.6 G/DL (ref 11.5–16.5)
THB: 15.7 G/DL (ref 11.5–16.5)
THB: 15.9 G/DL (ref 11.5–16.5)
TIME ANALYZED: 1008
TIME ANALYZED: 226
TIME ANALYZED: 511
TOTAL PROTEIN: 8.6 G/DL (ref 6.4–8.3)
TOXIC GRANULATION: ABNORMAL
TROPONIN: <0.01 NG/ML (ref 0–0.03)
TROPONIN: <0.01 NG/ML (ref 0–0.03)
TSH SERPL DL<=0.05 MIU/L-ACNC: 1.09 UIU/ML (ref 0.27–4.2)
VACUOLATED NEUTROPHILS: ABNORMAL
VT MECHANICAL: 500 ML
VT MECHANICAL: 650 ML
WBC # BLD: 8 E9/L (ref 4.5–11.5)

## 2020-06-19 PROCEDURE — 84132 ASSAY OF SERUM POTASSIUM: CPT

## 2020-06-19 PROCEDURE — 6370000000 HC RX 637 (ALT 250 FOR IP): Performed by: INTERNAL MEDICINE

## 2020-06-19 PROCEDURE — 87088 URINE BACTERIA CULTURE: CPT

## 2020-06-19 PROCEDURE — 2500000003 HC RX 250 WO HCPCS: Performed by: EMERGENCY MEDICINE

## 2020-06-19 PROCEDURE — 94640 AIRWAY INHALATION TREATMENT: CPT

## 2020-06-19 PROCEDURE — 82805 BLOOD GASES W/O2 SATURATION: CPT

## 2020-06-19 PROCEDURE — 80048 BASIC METABOLIC PNL TOTAL CA: CPT

## 2020-06-19 PROCEDURE — 93306 TTE W/DOPPLER COMPLETE: CPT

## 2020-06-19 PROCEDURE — 2580000003 HC RX 258: Performed by: INTERNAL MEDICINE

## 2020-06-19 PROCEDURE — 2000000000 HC ICU R&B

## 2020-06-19 PROCEDURE — 2700000000 HC OXYGEN THERAPY PER DAY

## 2020-06-19 PROCEDURE — 87040 BLOOD CULTURE FOR BACTERIA: CPT

## 2020-06-19 PROCEDURE — 84145 PROCALCITONIN (PCT): CPT

## 2020-06-19 PROCEDURE — 80053 COMPREHEN METABOLIC PANEL: CPT

## 2020-06-19 PROCEDURE — 96374 THER/PROPH/DIAG INJ IV PUSH: CPT

## 2020-06-19 PROCEDURE — 6360000002 HC RX W HCPCS: Performed by: INTERNAL MEDICINE

## 2020-06-19 PROCEDURE — 96375 TX/PRO/DX INJ NEW DRUG ADDON: CPT

## 2020-06-19 PROCEDURE — 71045 X-RAY EXAM CHEST 1 VIEW: CPT

## 2020-06-19 PROCEDURE — 6370000000 HC RX 637 (ALT 250 FOR IP): Performed by: EMERGENCY MEDICINE

## 2020-06-19 PROCEDURE — 93005 ELECTROCARDIOGRAM TRACING: CPT | Performed by: EMERGENCY MEDICINE

## 2020-06-19 PROCEDURE — 87450 HC DIRECT STREP B ANTIGEN: CPT

## 2020-06-19 PROCEDURE — 83880 ASSAY OF NATRIURETIC PEPTIDE: CPT

## 2020-06-19 PROCEDURE — 84484 ASSAY OF TROPONIN QUANT: CPT

## 2020-06-19 PROCEDURE — 6360000002 HC RX W HCPCS: Performed by: EMERGENCY MEDICINE

## 2020-06-19 PROCEDURE — 94660 CPAP INITIATION&MGMT: CPT

## 2020-06-19 PROCEDURE — U0002 COVID-19 LAB TEST NON-CDC: HCPCS

## 2020-06-19 PROCEDURE — 2580000003 HC RX 258

## 2020-06-19 PROCEDURE — 84443 ASSAY THYROID STIM HORMONE: CPT

## 2020-06-19 PROCEDURE — 36415 COLL VENOUS BLD VENIPUNCTURE: CPT

## 2020-06-19 PROCEDURE — 83605 ASSAY OF LACTIC ACID: CPT

## 2020-06-19 PROCEDURE — 85025 COMPLETE CBC W/AUTO DIFF WBC: CPT

## 2020-06-19 PROCEDURE — 99291 CRITICAL CARE FIRST HOUR: CPT

## 2020-06-19 PROCEDURE — 93010 ELECTROCARDIOGRAM REPORT: CPT | Performed by: INTERNAL MEDICINE

## 2020-06-19 RX ORDER — POLYETHYLENE GLYCOL 3350 17 G/17G
17 POWDER, FOR SOLUTION ORAL DAILY PRN
Status: DISCONTINUED | OUTPATIENT
Start: 2020-06-19 | End: 2020-06-23

## 2020-06-19 RX ORDER — SODIUM CHLORIDE 0.9 % (FLUSH) 0.9 %
10 SYRINGE (ML) INJECTION EVERY 12 HOURS SCHEDULED
Status: DISCONTINUED | OUTPATIENT
Start: 2020-06-19 | End: 2020-06-27 | Stop reason: HOSPADM

## 2020-06-19 RX ORDER — IPRATROPIUM BROMIDE AND ALBUTEROL SULFATE 2.5; .5 MG/3ML; MG/3ML
1 SOLUTION RESPIRATORY (INHALATION) EVERY 6 HOURS SCHEDULED
Status: DISCONTINUED | OUTPATIENT
Start: 2020-06-19 | End: 2020-06-19 | Stop reason: ALTCHOICE

## 2020-06-19 RX ORDER — LEVOTHYROXINE SODIUM 0.1 MG/1
100 TABLET ORAL DAILY
Status: DISCONTINUED | OUTPATIENT
Start: 2020-06-19 | End: 2020-06-27 | Stop reason: HOSPADM

## 2020-06-19 RX ORDER — FUROSEMIDE 10 MG/ML
80 INJECTION INTRAMUSCULAR; INTRAVENOUS 3 TIMES DAILY
Status: DISCONTINUED | OUTPATIENT
Start: 2020-06-19 | End: 2020-06-20

## 2020-06-19 RX ORDER — IPRATROPIUM BROMIDE AND ALBUTEROL SULFATE 2.5; .5 MG/3ML; MG/3ML
1 SOLUTION RESPIRATORY (INHALATION) EVERY 4 HOURS PRN
Status: DISCONTINUED | OUTPATIENT
Start: 2020-06-19 | End: 2020-06-22

## 2020-06-19 RX ORDER — ARFORMOTEROL TARTRATE 15 UG/2ML
15 SOLUTION RESPIRATORY (INHALATION) 2 TIMES DAILY
Status: DISCONTINUED | OUTPATIENT
Start: 2020-06-19 | End: 2020-06-27 | Stop reason: HOSPADM

## 2020-06-19 RX ORDER — ONDANSETRON 2 MG/ML
4 INJECTION INTRAMUSCULAR; INTRAVENOUS EVERY 6 HOURS PRN
Status: DISCONTINUED | OUTPATIENT
Start: 2020-06-19 | End: 2020-06-27 | Stop reason: HOSPADM

## 2020-06-19 RX ORDER — IPRATROPIUM BROMIDE AND ALBUTEROL SULFATE 2.5; .5 MG/3ML; MG/3ML
1 SOLUTION RESPIRATORY (INHALATION)
Status: DISCONTINUED | OUTPATIENT
Start: 2020-06-19 | End: 2020-06-19 | Stop reason: SDUPTHER

## 2020-06-19 RX ORDER — METHYLPREDNISOLONE SODIUM SUCCINATE 125 MG/2ML
125 INJECTION, POWDER, LYOPHILIZED, FOR SOLUTION INTRAMUSCULAR; INTRAVENOUS ONCE
Status: COMPLETED | OUTPATIENT
Start: 2020-06-19 | End: 2020-06-19

## 2020-06-19 RX ORDER — SODIUM CHLORIDE 0.9 % (FLUSH) 0.9 %
10 SYRINGE (ML) INJECTION PRN
Status: DISCONTINUED | OUTPATIENT
Start: 2020-06-19 | End: 2020-06-27 | Stop reason: HOSPADM

## 2020-06-19 RX ORDER — DOXAZOSIN 2 MG/1
2 TABLET ORAL DAILY
Status: DISCONTINUED | OUTPATIENT
Start: 2020-06-19 | End: 2020-06-27 | Stop reason: HOSPADM

## 2020-06-19 RX ORDER — ACETAMINOPHEN 325 MG/1
650 TABLET ORAL EVERY 6 HOURS PRN
Status: DISCONTINUED | OUTPATIENT
Start: 2020-06-19 | End: 2020-06-27 | Stop reason: HOSPADM

## 2020-06-19 RX ORDER — ONDANSETRON 2 MG/ML
4 INJECTION INTRAMUSCULAR; INTRAVENOUS EVERY 6 HOURS PRN
Status: DISCONTINUED | OUTPATIENT
Start: 2020-06-19 | End: 2020-06-19 | Stop reason: ALTCHOICE

## 2020-06-19 RX ORDER — FUROSEMIDE 10 MG/ML
20 INJECTION INTRAMUSCULAR; INTRAVENOUS ONCE
Status: COMPLETED | OUTPATIENT
Start: 2020-06-19 | End: 2020-06-19

## 2020-06-19 RX ORDER — SODIUM CHLORIDE 0.9 % (FLUSH) 0.9 %
SYRINGE (ML) INJECTION
Status: COMPLETED
Start: 2020-06-19 | End: 2020-06-19

## 2020-06-19 RX ORDER — METHYLPREDNISOLONE SODIUM SUCCINATE 40 MG/ML
40 INJECTION, POWDER, LYOPHILIZED, FOR SOLUTION INTRAMUSCULAR; INTRAVENOUS EVERY 8 HOURS
Status: DISCONTINUED | OUTPATIENT
Start: 2020-06-19 | End: 2020-06-23

## 2020-06-19 RX ORDER — CARVEDILOL 6.25 MG/1
6.25 TABLET ORAL 2 TIMES DAILY WITH MEALS
Status: DISCONTINUED | OUTPATIENT
Start: 2020-06-19 | End: 2020-06-19

## 2020-06-19 RX ORDER — DOCUSATE SODIUM 100 MG/1
100 CAPSULE, LIQUID FILLED ORAL 2 TIMES DAILY PRN
Status: DISCONTINUED | OUTPATIENT
Start: 2020-06-19 | End: 2020-06-23

## 2020-06-19 RX ORDER — ACETAMINOPHEN 650 MG/1
650 SUPPOSITORY RECTAL EVERY 6 HOURS PRN
Status: DISCONTINUED | OUTPATIENT
Start: 2020-06-19 | End: 2020-06-27 | Stop reason: HOSPADM

## 2020-06-19 RX ORDER — POTASSIUM CHLORIDE 20 MEQ/1
20 TABLET, EXTENDED RELEASE ORAL 2 TIMES DAILY WITH MEALS
Status: DISCONTINUED | OUTPATIENT
Start: 2020-06-19 | End: 2020-06-25

## 2020-06-19 RX ORDER — METOPROLOL TARTRATE 5 MG/5ML
5 INJECTION INTRAVENOUS ONCE
Status: COMPLETED | OUTPATIENT
Start: 2020-06-19 | End: 2020-06-19

## 2020-06-19 RX ORDER — PANTOPRAZOLE SODIUM 40 MG/1
40 TABLET, DELAYED RELEASE ORAL
Status: DISCONTINUED | OUTPATIENT
Start: 2020-06-19 | End: 2020-06-27 | Stop reason: HOSPADM

## 2020-06-19 RX ORDER — BUDESONIDE 0.5 MG/2ML
1 INHALANT ORAL 2 TIMES DAILY
Status: DISCONTINUED | OUTPATIENT
Start: 2020-06-19 | End: 2020-06-27 | Stop reason: HOSPADM

## 2020-06-19 RX ORDER — SPIRONOLACTONE 25 MG/1
25 TABLET ORAL DAILY
Status: DISCONTINUED | OUTPATIENT
Start: 2020-06-19 | End: 2020-06-27 | Stop reason: HOSPADM

## 2020-06-19 RX ORDER — FUROSEMIDE 10 MG/ML
40 INJECTION INTRAMUSCULAR; INTRAVENOUS 2 TIMES DAILY
Status: DISCONTINUED | OUTPATIENT
Start: 2020-06-19 | End: 2020-06-19

## 2020-06-19 RX ORDER — IPRATROPIUM BROMIDE AND ALBUTEROL SULFATE 2.5; .5 MG/3ML; MG/3ML
1 SOLUTION RESPIRATORY (INHALATION)
Status: DISCONTINUED | OUTPATIENT
Start: 2020-06-19 | End: 2020-06-27 | Stop reason: HOSPADM

## 2020-06-19 RX ORDER — PROMETHAZINE HYDROCHLORIDE 25 MG/1
12.5 TABLET ORAL EVERY 6 HOURS PRN
Status: DISCONTINUED | OUTPATIENT
Start: 2020-06-19 | End: 2020-06-27 | Stop reason: HOSPADM

## 2020-06-19 RX ADMIN — ENOXAPARIN SODIUM 40 MG: 40 INJECTION SUBCUTANEOUS at 10:42

## 2020-06-19 RX ADMIN — ARFORMOTEROL TARTRATE 15 MCG: 15 SOLUTION RESPIRATORY (INHALATION) at 19:41

## 2020-06-19 RX ADMIN — IPRATROPIUM BROMIDE AND ALBUTEROL SULFATE 1 AMPULE: 2.5; .5 SOLUTION RESPIRATORY (INHALATION) at 12:29

## 2020-06-19 RX ADMIN — FUROSEMIDE 20 MG: 10 INJECTION, SOLUTION INTRAMUSCULAR; INTRAVENOUS at 04:48

## 2020-06-19 RX ADMIN — Medication 10 ML: at 22:22

## 2020-06-19 RX ADMIN — IPRATROPIUM BROMIDE AND ALBUTEROL SULFATE 1 AMPULE: 2.5; .5 SOLUTION RESPIRATORY (INHALATION) at 16:39

## 2020-06-19 RX ADMIN — METOPROLOL TARTRATE 5 MG: 5 INJECTION INTRAVENOUS at 04:48

## 2020-06-19 RX ADMIN — POTASSIUM CHLORIDE 20 MEQ: 20 TABLET, EXTENDED RELEASE ORAL at 16:22

## 2020-06-19 RX ADMIN — BUDESONIDE 1000 MCG: 0.5 SUSPENSION RESPIRATORY (INHALATION) at 12:28

## 2020-06-19 RX ADMIN — METOPROLOL TARTRATE 25 MG: 25 TABLET, FILM COATED ORAL at 22:21

## 2020-06-19 RX ADMIN — FUROSEMIDE 80 MG: 10 INJECTION, SOLUTION INTRAMUSCULAR; INTRAVENOUS at 10:42

## 2020-06-19 RX ADMIN — FUROSEMIDE 80 MG: 10 INJECTION, SOLUTION INTRAMUSCULAR; INTRAVENOUS at 16:36

## 2020-06-19 RX ADMIN — IPRATROPIUM BROMIDE AND ALBUTEROL SULFATE 1 AMPULE: .5; 3 SOLUTION RESPIRATORY (INHALATION) at 05:30

## 2020-06-19 RX ADMIN — IPRATROPIUM BROMIDE AND ALBUTEROL SULFATE 1 AMPULE: .5; 3 SOLUTION RESPIRATORY (INHALATION) at 08:21

## 2020-06-19 RX ADMIN — METHYLPREDNISOLONE SODIUM SUCCINATE 125 MG: 125 INJECTION, POWDER, FOR SOLUTION INTRAMUSCULAR; INTRAVENOUS at 02:14

## 2020-06-19 RX ADMIN — METHYLPREDNISOLONE SODIUM SUCCINATE 40 MG: 40 INJECTION, POWDER, FOR SOLUTION INTRAMUSCULAR; INTRAVENOUS at 17:41

## 2020-06-19 RX ADMIN — Medication: at 12:20

## 2020-06-19 RX ADMIN — METHYLPREDNISOLONE SODIUM SUCCINATE 40 MG: 40 INJECTION, POWDER, FOR SOLUTION INTRAMUSCULAR; INTRAVENOUS at 10:42

## 2020-06-19 RX ADMIN — SODIUM CHLORIDE, PRESERVATIVE FREE 10 ML: 5 INJECTION INTRAVENOUS at 16:37

## 2020-06-19 RX ADMIN — PANTOPRAZOLE SODIUM 40 MG: 40 TABLET, DELAYED RELEASE ORAL at 16:23

## 2020-06-19 RX ADMIN — BUDESONIDE 1000 MCG: 0.5 SUSPENSION RESPIRATORY (INHALATION) at 19:41

## 2020-06-19 RX ADMIN — CARVEDILOL 6.25 MG: 6.25 TABLET, FILM COATED ORAL at 10:42

## 2020-06-19 RX ADMIN — FUROSEMIDE 80 MG: 10 INJECTION, SOLUTION INTRAMUSCULAR; INTRAVENOUS at 22:21

## 2020-06-19 RX ADMIN — IPRATROPIUM BROMIDE AND ALBUTEROL SULFATE 1 AMPULE: 2.5; .5 SOLUTION RESPIRATORY (INHALATION) at 19:41

## 2020-06-19 ASSESSMENT — PAIN SCALES - GENERAL
PAINLEVEL_OUTOF10: 0

## 2020-06-19 NOTE — H&P
A Heather, DO   docusate sodium (COLACE, DULCOLAX) 100 MG CAPS Take 100 mg by mouth 2 times daily 3/9/16   Suezanne Heimlich, DO   magnesium hydroxide (MILK OF MAGNESIA) 400 MG/5ML suspension Take 30 mLs by mouth daily as needed for Constipation 3/9/16   Suezanne Heimlich, DO   levothyroxine (SYNTHROID) 100 MCG tablet Take 1 tablet by mouth Daily 3/9/16   Suezanne Heimlich, DO   spironolactone (ALDACTONE) 25 MG tablet Take 25 mg by mouth daily Take 2 tablets daily    Historical Provider, MD       Allergies:  Amlodipine; Isosorbide mononitrate [isosorbide nitrate]; and Lisinopril    Social History:    TOBACCO:   reports that he quit smoking about 20 years ago. He has never used smokeless tobacco.  ETOH:   reports current alcohol use of about 21.0 standard drinks of alcohol per week. Family History:    Reviewed in detail and negative for DM, CAD, Cancer, CVA. Positive as follows\"  History reviewed. No pertinent family history. REVIEW OF SYSTEMS:   Pertinent positives as noted in the HPI. All other systems reviewed and negative. PHYSICAL EXAM:  BP (!) 137/102   Pulse 109   Temp 96.4 °F (35.8 °C)   Resp 8   Ht 6' (1.829 m)   Wt 297 lb (134.7 kg)   SpO2 96%   BMI 40.28 kg/m²   General appearance: Lethargic, on BiPAP  HEENT: Normal cephalic, atraumatic, facial features symmetrical  Neck: Supple, with full range of motion. Respiratory: Diminished bilaterally  Cardiovascular: The irregular, tachycardic  Abdomen: Soft, nondistended, nontender  Musculoskeletal: No clubbing, cyanosis. Brisk capillary refill. Skin: Normal skin color. No rashes or lesions. Neurologic:  Neurovascularly intact without any focal sensory/motor deficits.  Cranial nerves: II-XII intact, grossly non-focal.  Psychiatric: Alert and oriented, thought content appropriate, normal insight    Reviewed EKG and CXR personally    CBC:   Recent Labs     06/19/20  0220   WBC 8.0   RBC 5.13   HGB 15.1   HCT 53.7   .7*   RDW 17.2*    BMP:   Recent Labs     20  0511     --    K 4.6 4.92   CL 95*  --    CO2 37*  --    BUN 24*  --    CREATININE 1.2  --      LFT:  Recent Labs     20   PROT 8.6*   ALKPHOS 109   ALT 33   AST 41*   BILITOT 1.6*     CE:  Recent Labs     20   TROPONINI <0.01     PT/INR: No results for input(s): INR, APTT in the last 72 hours. BNP: No results for input(s): BNP in the last 72 hours. ESR: No results found for: SEDRATE  CRP: No results found for: CRP  D Dimer: No results found for: DDIMER   Folate and B12:   Lab Results   Component Value Date    XDEZYZIK76 511 2018   ,   Lab Results   Component Value Date    FOLATE 10.8 2018     Lactic Acid:   Lab Results   Component Value Date    LACTA 1.2 2018     Thyroid Studies:   Lab Results   Component Value Date    TSH 6.090 (H) 2018       Oupatient labs:  Lab Results   Component Value Date    CHOL 116 2016    TRIG 51 2016    HDL 51 2016    LDLCALC 55 2016    TSH 6.090 (H) 2018    INR 1.3 2018    LABA1C 6.3 (H) 2016       Urinalysis:    Lab Results   Component Value Date    NITRU Negative 2018    WBCUA NONE 2016    BACTERIA NONE 2016    RBCUA NONE 2016    BLOODU Negative 2018    SPECGRAV 1.010 2018    GLUCOSEU Negative 2018       Imaging:  Xr Chest Portable    Result Date: 2020  Patient MRN:  [id number] : 1945 Age: 70 years Gender: Male Order Date:  2020 2:00 AM EXAM: XR CHEST PORTABLE one image INDICATION:  Shortness of breath Shortness of breath COMPARISON: 2018 FINDINGS: There is cardiomegaly with the prominence of the superior mediastinum. There is vascular congestion with the perihilar and bilateral infiltrates and groundglass opacities. Small pleural effusions are suspected. CHF with developing edema. Superimposed pneumonia is considered. ASSESSMENT:  1.   Acute hypercapnic

## 2020-06-19 NOTE — FLOWSHEET NOTE
06/19/20 1509   Encounter Summary   Services provided to: Significant other  (listed as contact)   Referral/Consult From:   (by phone)   Support System Children   Continue Visiting   (6/19*)   Complexity of Encounter Moderate   Spiritual Assessment Completed Yes   Spiritual/Yazidism   Type Spiritual support   Assessment Calm;Coping   Intervention Explored feelings, thoughts, concerns;Prayer;Discussed illness/injury and it's impact   Outcome Expressed gratitude;Comfort;Expressed feelings/needs/concerns;Engaged in conversation

## 2020-06-19 NOTE — ED PROVIDER NOTES
HPI:  6/19/20, Time: 1:56 AM EDT         Jenifer Alvarenga is a 70 y.o. male presenting to the ED for shortness of breath for the past 2 days, worsened by exertion and relieved by nothing. The patient has a history of COPD and is chronically at baseline on 4 L of supplemental oxygen. The patient states that since yesterday he has had increased shortness of breath at rest and with exertion having to increase his oxygen. When EMS arrived to the house they noted that he had oxygen saturation in the low 90s on his baseline 4 L. They placed him on a nonrebreather mask. Patient states he has had a cough and some congestion. No fevers or chills. No chest pain. No GI complaints. No dizziness or lightheadedness. Review of Systems:   Pertinent positives and negatives are stated within HPI, all other systems reviewed and are negative.      --------------------------------------------- PAST HISTORY ---------------------------------------------  Past Medical History:  has a past medical history of Abdominal aortic aneurysm (AAA) >39 mm [facility] Anemia, Cardiomyopathy ([facility] Carpal tunnel syndrome, CHF (congestive heart failure) ([facility] COPD (chronic obstructive pulmonary disease) ([facility] GERD (gastroesophageal reflux disease), Hx of colonic polyps, Hypertension, Hypothyroidism, Rectal bleeding, Respiratory neoplasm, Sleep apnea, and Tobacco use. Past Surgical History:  has a past surgical history that includes Abdomen surgery; Colonoscopy; Salivary gland surgery; Cholecystectomy; Endoscopy, colon, diagnostic; Dilatation, esophagus; fracture surgery; Cardiac catheterization; joint replacement; vascular surgery; Upper gastrointestinal endoscopy (N/A, 12/9/2018); and Colonoscopy (N/A, 12/9/2018). Social History:  reports that he quit smoking about 20 years ago. He has never used smokeless tobacco. He reports current alcohol use of about 21.0 standard drinks of alcohol per week.     Family History: family history is not on file. The patients home medications have been reviewed.     Allergies: Amlodipine; Isosorbide mononitrate [isosorbide nitrate]; and Lisinopril    -------------------------------------------------- RESULTS -------------------------------------------------  All laboratory and radiology results have been personally reviewed by myself   LABS:  Results for orders placed or performed during the hospital encounter of 06/19/20   CBC Auto Differential   Result Value Ref Range    WBC 8.0 4.5 - 11.5 E9/L    RBC 5.13 3.80 - 5.80 E12/L    Hemoglobin 15.1 12.5 - 16.5 g/dL    Hematocrit 53.7 37.0 - 54.0 %    .7 (H) 80.0 - 99.9 fL    MCH 29.4 26.0 - 35.0 pg    MCHC 28.1 (L) 32.0 - 34.5 %    RDW 17.2 (H) 11.5 - 15.0 fL    Platelets 743 768 - 975 E9/L    MPV 11.5 7.0 - 12.0 fL    Neutrophils % 87.0 (H) 43.0 - 80.0 %    Lymphocytes % 2.6 (L) 20.0 - 42.0 %    Monocytes % 4.3 2.0 - 12.0 %    Eosinophils % 2.6 0.0 - 6.0 %    Basophils % 2.6 (H) 0.0 - 2.0 %    Neutrophils Absolute 7.04 1.80 - 7.30 E9/L    Lymphocytes Absolute 0.24 (L) 1.50 - 4.00 E9/L    Monocytes Absolute 0.32 0.10 - 0.95 E9/L    Eosinophils Absolute 0.21 0.05 - 0.50 E9/L    Basophils Absolute 0.21 (H) 0.00 - 0.20 E9/L    Metamyelocytes Relative 0.9 0.0 - 1.0 %    Toxic Granulation 1+     Vacuolated Neutrophils 1+     Anisocytosis 2+     Polychromasia 1+     Poikilocytes 1+     Ovalocytes 1+    Comprehensive Metabolic Panel   Result Value Ref Range    Sodium 141 132 - 146 mmol/L    Potassium 4.6 3.5 - 5.0 mmol/L    Chloride 95 (L) 98 - 107 mmol/L    CO2 37 (H) 22 - 29 mmol/L    Anion Gap 9 7 - 16 mmol/L    Glucose 108 (H) 74 - 99 mg/dL    BUN 24 (H) 8 - 23 mg/dL    CREATININE 1.2 0.7 - 1.2 mg/dL    GFR Non-African American 59 >=60 mL/min/1.73    GFR African American >60     Calcium 9.9 8.6 - 10.2 mg/dL    Total Protein 8.6 (H) 6.4 - 8.3 g/dL    Alb 3.7 3.5 - 5.2 g/dL    Total Bilirubin 1.6 (H) 0.0 - 1.2 mg/dL    Alkaline Phosphatase 109 40 - 129 U/L ALT 33 0 - 40 U/L    AST 41 (H) 0 - 39 U/L   Troponin   Result Value Ref Range    Troponin <0.01 0.00 - 0.03 ng/mL   Brain Natriuretic Peptide   Result Value Ref Range    Pro-BNP 5,433 (H) 0 - 125 pg/mL   Lactate, Sepsis   Result Value Ref Range    Lactic Acid, Sepsis 1.2 0.5 - 1.9 mmol/L   Blood Gas, Arterial   Result Value Ref Range    Date Analyzed [id number]     Time Analyzed 0226     Source: Blood Arterial     pH, Blood Gas 7.147 (LL) 7.350 - 7.450    PCO2 105.4 (HH) 35.0 - 45.0 mmHg    PO2 176.2 (H) 75.0 - 100.0 mmHg    HCO3 35.6 (H) 22.0 - 26.0 mmol/L    B.E. 2.5 -3.0 - 3.0 mmol/L    O2 Sat 98.8 (H) 92.0 - 98.5 %    O2Hb 97.0 94.0 - 97.0 %    COHb 1.5 0.0 - 1.5 %    MetHb 0.3 0.0 - 1.5 %    O2 Content 21.7 mL/dL    HHb 1.2 0.0 - 5.0 %    tHb (est) 15.7 11.5 - 16.5 g/dL    Mode NRB 10     Date Of Collection      Time Collected      Pt Temp 37.0 C     ID [id number]     Lab 01630     Critical(s) Notified Handed report to Dr/RN    EKG 12 Lead   Result Value Ref Range    Ventricular Rate 122 BPM    Atrial Rate 129 BPM    QRS Duration 98 ms    Q-T Interval 322 ms    QTc Calculation (Bazett) 458 ms    R Axis 121 degrees    T Axis -49 degrees       RADIOLOGY:  Interpreted by Radiologist.  XR CHEST PORTABLE    (Results Pending)       ------------------------- NURSING NOTES AND VITALS REVIEWED ---------------------------   The nursing notes within the ED encounter and vital signs as below have been reviewed.    BP (!) 149/102   Pulse 101   Temp 97.8 °F (36.6 °C)   Resp 20   Ht 6' (1.829 m)   Wt 297 lb (134.7 kg)   SpO2 96%   BMI 40.28 kg/m²   Oxygen Saturation Interpretation: Abnormal      ---------------------------------------------------PHYSICAL EXAM--------------------------------------      Constitutional/General: Alert and oriented x3,chronically ill appearing, non toxic in NAD  Head: Normocephalic and atraumatic  Eyes: PERRL, EOMI  Mouth: Oropharynx clear, handling secretions, no trismus  Neck: Supple, full ROM,   Pulmonary: Tachypnea. Decreased bibasilar breath sounds and poor air movement bilaterally. Patient can speak in full sentences. No accessory muscle use. Cardiovascular: Irregularly irregular, tachycardia, no murmurs, gallops, or rubs. 2+ distal pulses  Abdomen: Soft, non tender, distended  Extremities: Moves all extremities x 4. Warm and well perfused  Skin: warm and dry without rash  Neurologic: GCS 15  Psych: Normal Affect      ------------------------------ ED COURSE/MEDICAL DECISION MAKING----------------------  Medications   ipratropium-albuterol (DUONEB) nebulizer solution 1 ampule (has no administration in time range)   methylPREDNISolone sodium (SOLU-MEDROL) injection 125 mg (125 mg Intravenous Given 6/19/20 0214)   furosemide (LASIX) injection 20 mg (20 mg Intravenous Given 6/19/20 0448)   metoprolol (LOPRESSOR) injection 5 mg (5 mg Intravenous Given 6/19/20 0448)       EKG #1:  Interpreted by emergency department physician unless otherwise noted. Time:  0221    Rate: 132  Rhythm: Atrial fibrillation. Interpretation: Atrial fibrillation, chronic. EKG #2:  Interpreted by emergency department physician unless otherwise noted. Time:  0446    Rate: 122  Rhythm: Atrial fibrillation. Interpretation: atrial fibrillation, chronic     ED Course as of Jun 19 0457 Fri Jun 19, 2020   0432 Spoke with [facility] regarding admission.     [BM]      ED Course User Index  [BM] Lindsay Bang MD       Medical Decision Making: Kelechi Méndez presents for dyspnea. Differential diagnosis includes, but is not limited to, COPD exacerbation, URI, bronchitis, pneumothorax, PNA, CHF, PE, anxiety, pleural edema/effusion, and pericardial effusion. Patient arrived in acute respiratory distress. He was placed on BiPAP (AVAPS) after confirming patient has respiratory acidosis on ABG. Breathing status significantly improved on BiPAP. Patient given IV Solu-Medrol.   Based on his elevated BNP of over 5000

## 2020-06-19 NOTE — CONSULTS
Lisinopril    Social History:   TOBACCO:   reports that he quit smoking about 20 years ago. He has never used smokeless tobacco.  ETOH:   reports current alcohol use of about 21.0 standard drinks of alcohol per week. OCCUPATION:      Family History:   History reviewed. No pertinent family history. REVIEW OF SYSTEMS:  Unable to obtain due to mental status    Physical Exam     VS: BP (!) 137/102   Pulse 102   Temp 96.4 °F (35.8 °C)   Resp (!) 7   Ht 6' (1.829 m)   Wt 297 lb (134.7 kg)   SpO2 97%   BMI 40.28 kg/m²   General Appearance:    Somewhat obtunded though following commands   HEENT:    NC/AT, PERRL, no pallor no icterus, moist mucous membrane   Neck:   Supple, Normal thyroid; no carotid bruit or JVD   Resp:     Diminished, few wheezes, on bipap   Heart:    RRR, S1 & S2 normal, no murmur, rub or gallop.    Abdomen:     Soft, non-tender, BS +, no organomegaly   Extremities:   Normal, atraumatic, no cyanosis 1-2+ pitting edema BLE   Pulses:   2+ and symmetric all extremities   Skin:   BLE discoloration likely from chronic venous stasis   Neurologic:   Somewhat obtunded though following commands     Labs     CBC:   Lab Results   Component Value Date    WBC 8.0 06/19/2020    RBC 5.13 06/19/2020    HGB 15.1 06/19/2020    HCT 53.7 06/19/2020     06/19/2020    .7 06/19/2020     BMP:    Lab Results   Component Value Date     06/19/2020    K 4.92 06/19/2020    CL 95 06/19/2020    CO2 37 06/19/2020    BUN 24 06/19/2020    CREATININE 1.2 06/19/2020    GLUCOSE 108 06/19/2020    CALCIUM 9.9 06/19/2020     Hepatic Function Panel:    Lab Results   Component Value Date    ALKPHOS 109 06/19/2020    AST 41 06/19/2020    ALT 33 06/19/2020    PROT 8.6 06/19/2020    LABALBU 3.7 06/19/2020    BILITOT 1.6 06/19/2020     Cardiac Enzymes:   Lab Results   Component Value Date    TROPONINI <0.01 06/19/2020    TROPONINI <0.01 12/07/2018    TROPONINI 0.01 03/05/2016     PT/INR:    Lab Results   Component Value and coreg    A fib RVR  Noted in the ED  Responded to lopressor 5  HR now controlled    Pulmonary  Acute hypoxic hypercapnic resp failure  Likely 2/2 CHF exac +/- COPD exac  Presents with worsening SOB x2 days  Diminished breath sounds  BNP elevated around 5000, CXR with CHF and developing edema  Received 20 lasix in ED, duonebs and 125 solumedrol  Initial ABG 7.15/133/191/45  ABG on bipap 7.3/86/82/41  Echo 2018 with EF 50%, severe LV concentric hypertrophy  Repeat echo pending  Continue bipap  Diurese with lasix 80 TID- monitor UOP and BMP for K  Hold Abx for now  Daily BNP  Legionella and S pneumo pending    COPD,  ?exac  duonebs q4 while awake  pulmicort BID  Hold abx for now  Monitor resp status as diuresis progresses  Continue bipap  Solumedrol 40 q8    Gastrointestinal  Cardiac diet, no acute issues    Endocrine  Hypothyroidism  On home synthroid 100 mcg  Check TSH    Renal  Monitor UOP and kidney function with diuresis    Infectious  Afebrile, no elevated WBC, no concern for infectious process  Monitor off abx for now, cultures pending    Hem/Onc  Blood counts stable, no acute concerns    1. Code status: full    2. Peptic ulcer prophylaxis: protonix    3. DVT Prophylaxis: lovenox    4. Lines / tubes: peripherals, insert TLC    5. Disposition: admit to ICU    Abraham Vásquez M.D. PGY-1  Family Medicine Resident    Attending Physician: Dr. Rosa Ortega    I personally saw, examined and provided care for the patient. Radiographs, labs and medication list were reviewed by me independently. I spoke with bedside nursing, therapists and consultants. Critical care services and times documented are independent of procedures and multidisciplinary rounds with Residents. Additionally comprehensive, multidisciplinary rounds were conducted with the MICU team. The case was discussed in detail and plans for care were established. Review of Residents documentation was conducted and revisions were made as appropriate.  I agree with

## 2020-06-20 ENCOUNTER — APPOINTMENT (OUTPATIENT)
Dept: GENERAL RADIOLOGY | Age: 75
DRG: 291 | End: 2020-06-20
Payer: MEDICARE

## 2020-06-20 LAB
ALBUMIN SERPL-MCNC: 3.2 G/DL (ref 3.5–5.2)
ALP BLD-CCNC: 96 U/L (ref 40–129)
ALT SERPL-CCNC: 26 U/L (ref 0–40)
ANION GAP SERPL CALCULATED.3IONS-SCNC: 11 MMOL/L (ref 7–16)
ANION GAP SERPL CALCULATED.3IONS-SCNC: 6 MMOL/L (ref 7–16)
AST SERPL-CCNC: 30 U/L (ref 0–39)
BASOPHILS ABSOLUTE: 0.01 E9/L (ref 0–0.2)
BASOPHILS RELATIVE PERCENT: 0.1 % (ref 0–2)
BILIRUB SERPL-MCNC: 1 MG/DL (ref 0–1.2)
BUN BLDV-MCNC: 37 MG/DL (ref 8–23)
BUN BLDV-MCNC: 45 MG/DL (ref 8–23)
CALCIUM SERPL-MCNC: 9.2 MG/DL (ref 8.6–10.2)
CALCIUM SERPL-MCNC: 9.4 MG/DL (ref 8.6–10.2)
CHLORIDE BLD-SCNC: 92 MMOL/L (ref 98–107)
CHLORIDE BLD-SCNC: 92 MMOL/L (ref 98–107)
CO2: 42 MMOL/L (ref 22–29)
CO2: 46 MMOL/L (ref 22–29)
CREAT SERPL-MCNC: 1.4 MG/DL (ref 0.7–1.2)
CREAT SERPL-MCNC: 1.4 MG/DL (ref 0.7–1.2)
EOSINOPHILS ABSOLUTE: 0 E9/L (ref 0.05–0.5)
EOSINOPHILS RELATIVE PERCENT: 0 % (ref 0–6)
GFR AFRICAN AMERICAN: >60
GFR AFRICAN AMERICAN: >60
GFR NON-AFRICAN AMERICAN: 50 ML/MIN/1.73
GFR NON-AFRICAN AMERICAN: 50 ML/MIN/1.73
GLUCOSE BLD-MCNC: 131 MG/DL (ref 74–99)
GLUCOSE BLD-MCNC: 166 MG/DL (ref 74–99)
HCT VFR BLD CALC: 50.9 % (ref 37–54)
HEMOGLOBIN: 14.4 G/DL (ref 12.5–16.5)
IMMATURE GRANULOCYTES #: 0.04 E9/L
IMMATURE GRANULOCYTES %: 0.5 % (ref 0–5)
L. PNEUMOPHILA SEROGP 1 UR AG: NORMAL
LYMPHOCYTES ABSOLUTE: 0.23 E9/L (ref 1.5–4)
LYMPHOCYTES RELATIVE PERCENT: 2.9 % (ref 20–42)
MAGNESIUM: 2.1 MG/DL (ref 1.6–2.6)
MCH RBC QN AUTO: 29.2 PG (ref 26–35)
MCHC RBC AUTO-ENTMCNC: 28.3 % (ref 32–34.5)
MCV RBC AUTO: 103.2 FL (ref 80–99.9)
MONOCYTES ABSOLUTE: 0.35 E9/L (ref 0.1–0.95)
MONOCYTES RELATIVE PERCENT: 4.4 % (ref 2–12)
NEUTROPHILS ABSOLUTE: 7.34 E9/L (ref 1.8–7.3)
NEUTROPHILS RELATIVE PERCENT: 92.1 % (ref 43–80)
PDW BLD-RTO: 17.2 FL (ref 11.5–15)
PLATELET # BLD: 163 E9/L (ref 130–450)
PMV BLD AUTO: 12.2 FL (ref 7–12)
POTASSIUM REFLEX MAGNESIUM: 4.4 MMOL/L (ref 3.5–5)
POTASSIUM SERPL-SCNC: 4.4 MMOL/L (ref 3.5–5)
POTASSIUM SERPL-SCNC: 4.5 MMOL/L (ref 3.5–5)
PRO-BNP: 6825 PG/ML (ref 0–125)
RBC # BLD: 4.93 E12/L (ref 3.8–5.8)
RBC # BLD: NORMAL 10*6/UL
SODIUM BLD-SCNC: 144 MMOL/L (ref 132–146)
SODIUM BLD-SCNC: 145 MMOL/L (ref 132–146)
STREP PNEUMONIAE ANTIGEN, URINE: NORMAL
TOTAL PROTEIN: 7.5 G/DL (ref 6.4–8.3)
WBC # BLD: 8 E9/L (ref 4.5–11.5)

## 2020-06-20 PROCEDURE — 71045 X-RAY EXAM CHEST 1 VIEW: CPT

## 2020-06-20 PROCEDURE — 36415 COLL VENOUS BLD VENIPUNCTURE: CPT

## 2020-06-20 PROCEDURE — 94669 MECHANICAL CHEST WALL OSCILL: CPT

## 2020-06-20 PROCEDURE — 80053 COMPREHEN METABOLIC PANEL: CPT

## 2020-06-20 PROCEDURE — 83880 ASSAY OF NATRIURETIC PEPTIDE: CPT

## 2020-06-20 PROCEDURE — 94640 AIRWAY INHALATION TREATMENT: CPT

## 2020-06-20 PROCEDURE — 80048 BASIC METABOLIC PNL TOTAL CA: CPT

## 2020-06-20 PROCEDURE — 2700000000 HC OXYGEN THERAPY PER DAY

## 2020-06-20 PROCEDURE — 6360000002 HC RX W HCPCS: Performed by: INTERNAL MEDICINE

## 2020-06-20 PROCEDURE — 6370000000 HC RX 637 (ALT 250 FOR IP): Performed by: INTERNAL MEDICINE

## 2020-06-20 PROCEDURE — 83735 ASSAY OF MAGNESIUM: CPT

## 2020-06-20 PROCEDURE — 85025 COMPLETE CBC W/AUTO DIFF WBC: CPT

## 2020-06-20 PROCEDURE — 94660 CPAP INITIATION&MGMT: CPT

## 2020-06-20 PROCEDURE — 6360000002 HC RX W HCPCS: Performed by: FAMILY MEDICINE

## 2020-06-20 PROCEDURE — 2580000003 HC RX 258: Performed by: INTERNAL MEDICINE

## 2020-06-20 PROCEDURE — 2000000000 HC ICU R&B

## 2020-06-20 PROCEDURE — 94664 DEMO&/EVAL PT USE INHALER: CPT

## 2020-06-20 RX ORDER — FUROSEMIDE 10 MG/ML
40 INJECTION INTRAMUSCULAR; INTRAVENOUS 3 TIMES DAILY
Status: DISCONTINUED | OUTPATIENT
Start: 2020-06-20 | End: 2020-06-23

## 2020-06-20 RX ORDER — FUROSEMIDE 10 MG/ML
40 INJECTION INTRAMUSCULAR; INTRAVENOUS ONCE
Status: COMPLETED | OUTPATIENT
Start: 2020-06-20 | End: 2020-06-20

## 2020-06-20 RX ORDER — BISACODYL 10 MG
10 SUPPOSITORY, RECTAL RECTAL ONCE
Status: COMPLETED | OUTPATIENT
Start: 2020-06-20 | End: 2020-06-20

## 2020-06-20 RX ORDER — ACETAZOLAMIDE 250 MG/1
250 TABLET ORAL DAILY
Status: DISCONTINUED | OUTPATIENT
Start: 2020-06-20 | End: 2020-06-27 | Stop reason: HOSPADM

## 2020-06-20 RX ADMIN — BUDESONIDE 1000 MCG: 0.5 SUSPENSION RESPIRATORY (INHALATION) at 07:46

## 2020-06-20 RX ADMIN — Medication 10 ML: at 09:45

## 2020-06-20 RX ADMIN — BUDESONIDE 1000 MCG: 0.5 SUSPENSION RESPIRATORY (INHALATION) at 20:20

## 2020-06-20 RX ADMIN — SPIRONOLACTONE 25 MG: 25 TABLET ORAL at 09:43

## 2020-06-20 RX ADMIN — IPRATROPIUM BROMIDE AND ALBUTEROL SULFATE 1 AMPULE: 2.5; .5 SOLUTION RESPIRATORY (INHALATION) at 07:46

## 2020-06-20 RX ADMIN — BISACODYL 10 MG: 10 SUPPOSITORY RECTAL at 22:13

## 2020-06-20 RX ADMIN — PANTOPRAZOLE SODIUM 40 MG: 40 TABLET, DELAYED RELEASE ORAL at 05:44

## 2020-06-20 RX ADMIN — METHYLPREDNISOLONE SODIUM SUCCINATE 40 MG: 40 INJECTION, POWDER, FOR SOLUTION INTRAMUSCULAR; INTRAVENOUS at 18:20

## 2020-06-20 RX ADMIN — Medication 10 ML: at 22:13

## 2020-06-20 RX ADMIN — METHYLPREDNISOLONE SODIUM SUCCINATE 40 MG: 40 INJECTION, POWDER, FOR SOLUTION INTRAMUSCULAR; INTRAVENOUS at 02:00

## 2020-06-20 RX ADMIN — IPRATROPIUM BROMIDE AND ALBUTEROL SULFATE 1 AMPULE: 2.5; .5 SOLUTION RESPIRATORY (INHALATION) at 11:37

## 2020-06-20 RX ADMIN — DOCUSATE SODIUM 100 MG: 100 CAPSULE, LIQUID FILLED ORAL at 15:33

## 2020-06-20 RX ADMIN — POTASSIUM CHLORIDE 20 MEQ: 20 TABLET, EXTENDED RELEASE ORAL at 17:13

## 2020-06-20 RX ADMIN — PANTOPRAZOLE SODIUM 40 MG: 40 TABLET, DELAYED RELEASE ORAL at 17:13

## 2020-06-20 RX ADMIN — IPRATROPIUM BROMIDE AND ALBUTEROL SULFATE 1 AMPULE: 2.5; .5 SOLUTION RESPIRATORY (INHALATION) at 16:00

## 2020-06-20 RX ADMIN — FUROSEMIDE 40 MG: 10 INJECTION, SOLUTION INTRAMUSCULAR; INTRAVENOUS at 17:13

## 2020-06-20 RX ADMIN — POTASSIUM CHLORIDE 20 MEQ: 20 TABLET, EXTENDED RELEASE ORAL at 09:43

## 2020-06-20 RX ADMIN — LEVOTHYROXINE SODIUM 100 MCG: 0.1 TABLET ORAL at 05:44

## 2020-06-20 RX ADMIN — METHYLPREDNISOLONE SODIUM SUCCINATE 40 MG: 40 INJECTION, POWDER, FOR SOLUTION INTRAMUSCULAR; INTRAVENOUS at 10:11

## 2020-06-20 RX ADMIN — IPRATROPIUM BROMIDE AND ALBUTEROL SULFATE 1 AMPULE: 2.5; .5 SOLUTION RESPIRATORY (INHALATION) at 20:20

## 2020-06-20 RX ADMIN — DOXAZOSIN 2 MG: 2 TABLET ORAL at 09:42

## 2020-06-20 RX ADMIN — ARFORMOTEROL TARTRATE 15 MCG: 15 SOLUTION RESPIRATORY (INHALATION) at 20:20

## 2020-06-20 RX ADMIN — METOPROLOL TARTRATE 25 MG: 25 TABLET, FILM COATED ORAL at 22:12

## 2020-06-20 RX ADMIN — ENOXAPARIN SODIUM 40 MG: 100 INJECTION SUBCUTANEOUS at 09:45

## 2020-06-20 RX ADMIN — FUROSEMIDE 40 MG: 10 INJECTION, SOLUTION INTRAMUSCULAR; INTRAVENOUS at 10:11

## 2020-06-20 RX ADMIN — ACETAZOLAMIDE 250 MG: 250 TABLET ORAL at 15:33

## 2020-06-20 RX ADMIN — ARFORMOTEROL TARTRATE 15 MCG: 15 SOLUTION RESPIRATORY (INHALATION) at 07:46

## 2020-06-20 RX ADMIN — METOPROLOL TARTRATE 25 MG: 25 TABLET, FILM COATED ORAL at 09:43

## 2020-06-20 ASSESSMENT — PAIN SCALES - GENERAL
PAINLEVEL_OUTOF10: 0

## 2020-06-20 NOTE — PROGRESS NOTES
Hospitalist Progress Note      SYNOPSIS: Patient admitted on  68-year-old male with past medical history of COPD, diastolic congestive heart failure and cardiomyopathy presented on 2020 with shortness of breath. Began 2 days prior to admission. Chronically oxygen dependent on 4 L. Admitted for acute respiratory failure with hypercapnia. Placed on BiPAP and ICU admission initially. SUBJECTIVE:    Patient seen and examined  Records reviewed. Much better today. Mentation baseline. Stable overnight. No other overnight issues reported. Temp (24hrs), Av.2 °F (36.8 °C), Min:96.4 °F (35.8 °C), Max:99.7 °F (37.6 °C)    DIET: DIET CARDIAC; Low Sodium (2 GM); Daily Fluid Restriction: 1500 ml  CODE: Full Code    Intake/Output Summary (Last 24 hours) at 2020 0920  Last data filed at 2020 0545  Gross per 24 hour   Intake 700 ml   Output 5249 ml   Net -4549 ml       OBJECTIVE:    BP (!) 117/95   Pulse 103   Temp 97.9 °F (36.6 °C)   Resp 17   Ht 6' (1.829 m)   Wt 290 lb 9.1 oz (131.8 kg)   SpO2 92%   BMI 39.41 kg/m²     General appearance: No acute distress, breathing without difficulty  HEENT: Normocephalic, atraumatic. EOMI  Neck: Supple. Trachea midline  Respiratory: Clear to auscultation bilaterally  Cardiovascular: Regular rate rhythm, normal S1-S2  Abdomen: Soft, nontender, nondistended  Musculoskeletal: Normal range of motion, no deformities  Neurologic: awake, alert and following commands     ASSESSMENT:  1. Acute decompensated diastolic congestive heart failure  2. COPD exacerbation  3. Acute hypoxic/hypercapnic respiratory failure secondary to #1 and #2  4. Acute encephalopathy 2/2 CO2 retention, resolving  5. Atrial fibrillation with RVR, resolved  6. Chronic atrial fibrillation  7. Hypothyroidism  8. Morbid obesity     PLAN:  1. Clinically much improved, anticipate transfer to medical floor  2. Pulmicort twice daily  3.   Solu-Medrol 40 mg every 8 DAILY    +++++++++++++++++++++++++++++++++++++++++++++++++  [address], [place]  +++++++++++++++++++++++++++++++++++++++++++++++++  NOTE: This report was transcribed using voice recognition software. Every effort was made to ensure accuracy; however, inadvertent computerized transcription errors may be present.

## 2020-06-20 NOTE — FLOWSHEET NOTE
Placed back on bipap Test Date:    2018-12-04               Test Time:    19:30:50

Technician:   MT                                     

                                                     

MEASUREMENT RESULTS:                                       

Intervals:                                           

Rate:         51                                     

AR:           110                                    

QRSD:         78                                     

QT:           450                                    

QTc:          414                                    

Axis:                                                

P:            13                                     

AR:           110                                    

QRS:          73                                     

T:            72                                     

                                                     

INTERPRETIVE STATEMENTS:                                       

                                                     

Sinus bradycardia with short AR

Otherwise normal ECG

No previous ECG available for comparison



Electronically Signed On 12-05-18 08:04:36 CST by Solis Enciso

## 2020-06-20 NOTE — PROGRESS NOTES
[address] [facility]   Department of Internal Medicine   Internal Medicine Residency  MICU Progress Note    Patient:  Maryann Telles 70 y.o. male   MRN: [id number]       Date of Service: 2020    Allergy: Amlodipine; Isosorbide mononitrate [isosorbide nitrate]; and Lisinopril    CC: f/u resp failure    Subjective     Patient was seen and examined in AM. Feels improved, mentation at baseline. Still reports some shortness of breath. 24 hour change: Stable overnight. No acute overnight issues reported. Objective     TEMPERATURE:  Current - Temp: 97.9 °F (36.6 °C); Max - Temp  Av.2 °F (36.8 °C)  Min: 96.4 °F (35.8 °C)  Max: 99.7 °F (37.6 °C)  RESPIRATIONS RANGE: Resp  Av.4  Min: 7  Max: 42  PULSE RANGE: Pulse  Av.1  Min: 91  Max: 121  BLOOD PRESSURE RANGE:  Systolic (31ZDN), MBR:772 , Min:90 , MDO:801   ; Diastolic (62YAP), BPX:57, Min:72, Max:116    PULSE OXIMETRY RANGE: SpO2  Av.7 %  Min: 85 %  Max: 99 %    I & O - 24hr:    Intake/Output Summary (Last 24 hours) at 2020 0831  Last data filed at 2020 0545  Gross per 24 hour   Intake 700 ml   Output 5249 ml   Net -4549 ml     I/O last 3 completed shifts: In: 700 [P.O.:700]  Out: 5249 [Urine:5249] No intake/output data recorded. Weight change: -6 lb 6.9 oz (-2.918 kg)    Physical Exam:  General Appearance:    Alert, cooperative, no acute distress. HEENT:    NC/AT, mucous membranes are moist   Neck:   Supple, no jugular venous distention. Resp:   Diminished, with b/l wheezes and crackles   Heart:    irreg irreg, S1 and S2 normal, no murmur, rub or gallop.     Abdomen:     Soft, non-tender, non-distended with normal bowel sounds   Extremities:   Atraumatic, no cyanosis, chronic discoloration BLE, edema improved in BLE   Pulses:  Radial and pedal pulses are intact bilaterally   Neurologic:   AAOx3, No focal motor deficit       Medications     Continuous Infusions:  Scheduled Meds:   potassium chloride  20 mEq Oral BID WC for: LABURIN  Legionella No results found for: LABLEGI  C Diff PCR No results found for: CDIFPCR  Wound culture/abscess: No results for input(s): WNDABS in the last 72 hours. Tip culture:No results for input(s): CXCATHTIP in the last 72 hours. Antibiotic  Days  Day started                                Oxygen:     Vent Information  Skin Assessment: Clean, dry, & intact  Vt Ordered: 650 mL  FiO2 : 49 %  SpO2: 92 %  I Time/ I Time %: 0.9 s  Additional Respiratory  Assessments  Pulse: 103  Resp: 17  SpO2: 92 %     Nasal cannula L/min     Face mask %     Reservoirs mask %       ABG   Vent Settings     PH   Mode      PCO2   TV      PO2   RR      HCO3   PS      Sat%   PEEP      FIO2   FIO2        P/F          Lines:  Site  Day  Date inserted     TLC              PICC              Arterial line              Peripheral line              HD cath            Urethral Catheter Non-latex; Temperature probe-Output (mL): 100 mL    Imaging Studies:    XR CHEST PORTABLE   Final Result      Atelectasis and/or consolidation within the mid and lower lungs   bilaterally in the setting of cardiomegaly, possibly representing   pulmonary edema. A multifocal or viral pneumonia would be difficult to   exclude. XR CHEST PORTABLE   Final Result   CHF with developing edema. Superimposed pneumonia is considered.                   Resident's Assessment and Plan   Neurology  Acute encephalopathy, resolved  2/2 CO2 retention       Cardiovascular  Acute decompensated CHF  Presents with worsening SOB x2 days  Diminished breath sounds  BNP elevated around 5000, CXR with CHF and developing edema  Received 20 lasix in ED, duonebs and 125 solumedrol  Echo with EF 55%, mild concentric LVH  Diuresis cut back to 40 TID, will give diamox too   Hold Abx for now  Daily BNP  Continue aldactone and coreg     A fib RVR   Noted in the ED  Responded to lopressor 5  -120s, started back on home BB lopressor 25 BID     Pulmonary  Acute hypoxic hypercapnic resp failure  Likely 2/2 CHF exac +/- COPD exac  Presents with worsening SOB x2 days  Diminished breath sounds  BNP elevated around 5000, CXR with CHF and developing edema  Received 20 lasix in ED, duonebs and 125 solumedrol  Initial ABG 7.15/133/191/45  ABG on bipap 7.3/86/82/41  Continue bipap  Diurese with lasix 40 TID   Hold Abx for now  Daily BNP  Legionella and S pneumo pending     COPD,  ?exac  duonebs q4 while awake  pulmicort BID  Hold abx for now  Monitor resp status as diuresis progresses  Continue bipap  Solumedrol 40 q8     Gastrointestinal  Cardiac diet, no acute issues     Endocrine  Hypothyroidism  On home synthroid 100 mcg  Check TSH     Renal  Monitor UOP and kidney function with diuresis     Infectious  Afebrile, no elevated WBC, no concern for infectious process  Monitor off abx for now, cultures pending     Hem/Onc  Blood counts stable, no acute concerns      # Peptic ulcer prophylaxis: protonix  # DVT Prophylaxis: lovenox  # Disposition: continue current care    Ronit Gusman M.D., PGY-1  Family medicine resident  Attending Physician: Dr. Lavonne Landin       I personally saw, examined and provided care for the patient. Radiographs, labs and medication list were reviewed by me independently. I spoke with bedside nursing, therapists and consultants. Critical care services and times documented are independent of procedures and multidisciplinary rounds with Residents. Additionally comprehensive, multidisciplinary rounds were conducted with the MICU team. The case was discussed in detail and plans for care were established. Review of Residents documentation was conducted and revisions were made as appropriate. I agree with the above documented exam, problem list and plan of care. Marie Bahena M.D.    Pulmonary/Critical Care Medicine   36 min cct excluding procedures

## 2020-06-21 LAB
AADO2: 42.6 MMHG
ALBUMIN SERPL-MCNC: 3.6 G/DL (ref 3.5–5.2)
ALP BLD-CCNC: 90 U/L (ref 40–129)
ALT SERPL-CCNC: 27 U/L (ref 0–40)
ANION GAP SERPL CALCULATED.3IONS-SCNC: 14 MMOL/L (ref 7–16)
ANISOCYTOSIS: ABNORMAL
AST SERPL-CCNC: 29 U/L (ref 0–39)
B.E.: 19.6 MMOL/L (ref -3–3)
BASOPHILS ABSOLUTE: 0 E9/L (ref 0–0.2)
BASOPHILS RELATIVE PERCENT: 0.2 % (ref 0–2)
BILIRUB SERPL-MCNC: 0.9 MG/DL (ref 0–1.2)
BUN BLDV-MCNC: 50 MG/DL (ref 8–23)
BURR CELLS: ABNORMAL
CALCIUM SERPL-MCNC: 9.3 MG/DL (ref 8.6–10.2)
CHLORIDE BLD-SCNC: 90 MMOL/L (ref 98–107)
CO2: 42 MMOL/L (ref 22–29)
COHB: 1.2 % (ref 0–1.5)
CREAT SERPL-MCNC: 1.5 MG/DL (ref 0.7–1.2)
CRITICAL: ABNORMAL
DATE ANALYZED: ABNORMAL
DATE OF COLLECTION: ABNORMAL
EOSINOPHILS ABSOLUTE: 0 E9/L (ref 0.05–0.5)
EOSINOPHILS RELATIVE PERCENT: 0 % (ref 0–6)
FIO2: 40 %
GFR AFRICAN AMERICAN: 55
GFR NON-AFRICAN AMERICAN: 46 ML/MIN/1.73
GLUCOSE BLD-MCNC: 133 MG/DL (ref 74–99)
HCO3: 53.4 MMOL/L (ref 22–26)
HCT VFR BLD CALC: 53.4 % (ref 37–54)
HEMOGLOBIN: 15.1 G/DL (ref 12.5–16.5)
HHB: 3.3 % (ref 0–5)
HYPOCHROMIA: ABNORMAL
LAB: ABNORMAL
LYMPHOCYTES ABSOLUTE: 0.21 E9/L (ref 1.5–4)
LYMPHOCYTES RELATIVE PERCENT: 1.7 % (ref 20–42)
Lab: ABNORMAL
MCH RBC QN AUTO: 29.5 PG (ref 26–35)
MCHC RBC AUTO-ENTMCNC: 28.3 % (ref 32–34.5)
MCV RBC AUTO: 104.5 FL (ref 80–99.9)
METHB: 0.3 % (ref 0–1.5)
MODE: ABNORMAL
MONOCYTES ABSOLUTE: 0.53 E9/L (ref 0.1–0.95)
MONOCYTES RELATIVE PERCENT: 5.2 % (ref 2–12)
NEUTROPHILS ABSOLUTE: 9.86 E9/L (ref 1.8–7.3)
NEUTROPHILS RELATIVE PERCENT: 93 % (ref 43–80)
O2 CONTENT: 21 ML/DL
O2 SATURATION: 96.6 % (ref 92–98.5)
O2HB: 95.2 % (ref 94–97)
OPERATOR ID: 1394
OVALOCYTES: ABNORMAL
PATIENT TEMP: 37 C
PCO2: 115.4 MMHG (ref 35–45)
PDW BLD-RTO: 17 FL (ref 11.5–15)
PEEP/CPAP: 8 CMH2O
PFO2: 2.5 MMHG/%
PH BLOOD GAS: 7.28 (ref 7.35–7.45)
PLATELET # BLD: 171 E9/L (ref 130–450)
PMV BLD AUTO: 11.9 FL (ref 7–12)
PO2: 99.9 MMHG (ref 75–100)
POIKILOCYTES: ABNORMAL
POLYCHROMASIA: ABNORMAL
POTASSIUM REFLEX MAGNESIUM: 4.6 MMOL/L (ref 3.5–5)
POTASSIUM SERPL-SCNC: 4.6 MMOL/L (ref 3.5–5)
PRO-BNP: 7282 PG/ML (ref 0–125)
RBC # BLD: 5.11 E12/L (ref 3.8–5.8)
RI(T): 0.43
RR MECHANICAL: 12 B/MIN
SODIUM BLD-SCNC: 146 MMOL/L (ref 132–146)
SOURCE, BLOOD GAS: ABNORMAL
THB: 15.6 G/DL (ref 11.5–16.5)
TIME ANALYZED: 857
TOTAL PROTEIN: 8.2 G/DL (ref 6.4–8.3)
URINE CULTURE, ROUTINE: NORMAL
VT MECHANICAL: 650 ML
WBC # BLD: 10.6 E9/L (ref 4.5–11.5)

## 2020-06-21 PROCEDURE — 6370000000 HC RX 637 (ALT 250 FOR IP): Performed by: INTERNAL MEDICINE

## 2020-06-21 PROCEDURE — 80048 BASIC METABOLIC PNL TOTAL CA: CPT

## 2020-06-21 PROCEDURE — 6360000002 HC RX W HCPCS: Performed by: INTERNAL MEDICINE

## 2020-06-21 PROCEDURE — 2580000003 HC RX 258: Performed by: INTERNAL MEDICINE

## 2020-06-21 PROCEDURE — 82805 BLOOD GASES W/O2 SATURATION: CPT

## 2020-06-21 PROCEDURE — 94640 AIRWAY INHALATION TREATMENT: CPT

## 2020-06-21 PROCEDURE — 2000000000 HC ICU R&B

## 2020-06-21 PROCEDURE — 36415 COLL VENOUS BLD VENIPUNCTURE: CPT

## 2020-06-21 PROCEDURE — 80053 COMPREHEN METABOLIC PANEL: CPT

## 2020-06-21 PROCEDURE — 83880 ASSAY OF NATRIURETIC PEPTIDE: CPT

## 2020-06-21 PROCEDURE — 94660 CPAP INITIATION&MGMT: CPT

## 2020-06-21 PROCEDURE — 6370000000 HC RX 637 (ALT 250 FOR IP)

## 2020-06-21 PROCEDURE — 85025 COMPLETE CBC W/AUTO DIFF WBC: CPT

## 2020-06-21 RX ORDER — SODIUM CHLORIDE FOR INHALATION 3 %
4 VIAL, NEBULIZER (ML) INHALATION PRN
Status: DISCONTINUED | OUTPATIENT
Start: 2020-06-21 | End: 2020-06-27 | Stop reason: HOSPADM

## 2020-06-21 RX ORDER — DIMETHICONE, OXYBENZONE, AND PADIMATE O 2; 2.5; 6.6 G/100G; G/100G; G/100G
STICK TOPICAL
Status: COMPLETED
Start: 2020-06-21 | End: 2020-06-21

## 2020-06-21 RX ADMIN — Medication 10 ML: at 09:15

## 2020-06-21 RX ADMIN — IPRATROPIUM BROMIDE AND ALBUTEROL SULFATE 1 AMPULE: 2.5; .5 SOLUTION RESPIRATORY (INHALATION) at 11:46

## 2020-06-21 RX ADMIN — DOCUSATE SODIUM 100 MG: 100 CAPSULE, LIQUID FILLED ORAL at 21:47

## 2020-06-21 RX ADMIN — Medication: at 12:30

## 2020-06-21 RX ADMIN — BUDESONIDE 1000 MCG: 0.5 SUSPENSION RESPIRATORY (INHALATION) at 08:01

## 2020-06-21 RX ADMIN — PANTOPRAZOLE SODIUM 40 MG: 40 TABLET, DELAYED RELEASE ORAL at 06:30

## 2020-06-21 RX ADMIN — METOPROLOL TARTRATE 25 MG: 25 TABLET, FILM COATED ORAL at 21:47

## 2020-06-21 RX ADMIN — POTASSIUM CHLORIDE 20 MEQ: 20 TABLET, EXTENDED RELEASE ORAL at 08:14

## 2020-06-21 RX ADMIN — FUROSEMIDE 40 MG: 10 INJECTION, SOLUTION INTRAMUSCULAR; INTRAVENOUS at 14:16

## 2020-06-21 RX ADMIN — METHYLPREDNISOLONE SODIUM SUCCINATE 40 MG: 40 INJECTION, POWDER, FOR SOLUTION INTRAMUSCULAR; INTRAVENOUS at 18:22

## 2020-06-21 RX ADMIN — METOPROLOL TARTRATE 25 MG: 25 TABLET, FILM COATED ORAL at 09:14

## 2020-06-21 RX ADMIN — POTASSIUM CHLORIDE 20 MEQ: 20 TABLET, EXTENDED RELEASE ORAL at 16:42

## 2020-06-21 RX ADMIN — FUROSEMIDE 40 MG: 10 INJECTION, SOLUTION INTRAMUSCULAR; INTRAVENOUS at 21:47

## 2020-06-21 RX ADMIN — IPRATROPIUM BROMIDE AND ALBUTEROL SULFATE 1 AMPULE: 2.5; .5 SOLUTION RESPIRATORY (INHALATION) at 08:01

## 2020-06-21 RX ADMIN — IPRATROPIUM BROMIDE AND ALBUTEROL SULFATE 1 AMPULE: 2.5; .5 SOLUTION RESPIRATORY (INHALATION) at 19:26

## 2020-06-21 RX ADMIN — Medication 10 ML: at 21:47

## 2020-06-21 RX ADMIN — ACETAZOLAMIDE 250 MG: 250 TABLET ORAL at 09:14

## 2020-06-21 RX ADMIN — METHYLPREDNISOLONE SODIUM SUCCINATE 40 MG: 40 INJECTION, POWDER, FOR SOLUTION INTRAMUSCULAR; INTRAVENOUS at 10:14

## 2020-06-21 RX ADMIN — ARFORMOTEROL TARTRATE 15 MCG: 15 SOLUTION RESPIRATORY (INHALATION) at 08:00

## 2020-06-21 RX ADMIN — FUROSEMIDE 40 MG: 10 INJECTION, SOLUTION INTRAMUSCULAR; INTRAVENOUS at 09:14

## 2020-06-21 RX ADMIN — IPRATROPIUM BROMIDE AND ALBUTEROL SULFATE 1 AMPULE: 2.5; .5 SOLUTION RESPIRATORY (INHALATION) at 16:22

## 2020-06-21 RX ADMIN — BUDESONIDE 1000 MCG: 0.5 SUSPENSION RESPIRATORY (INHALATION) at 19:25

## 2020-06-21 RX ADMIN — LIDOCAINE HYDROCHLORIDE: 20 SOLUTION ORAL; TOPICAL at 12:00

## 2020-06-21 RX ADMIN — METHYLPREDNISOLONE SODIUM SUCCINATE 40 MG: 40 INJECTION, POWDER, FOR SOLUTION INTRAMUSCULAR; INTRAVENOUS at 04:00

## 2020-06-21 RX ADMIN — SODIUM CHLORIDE SOLN NEBU 3% 4 ML: 3 NEBU SOLN at 16:22

## 2020-06-21 RX ADMIN — PANTOPRAZOLE SODIUM 40 MG: 40 TABLET, DELAYED RELEASE ORAL at 16:42

## 2020-06-21 RX ADMIN — SPIRONOLACTONE 25 MG: 25 TABLET ORAL at 09:14

## 2020-06-21 RX ADMIN — DOXAZOSIN 2 MG: 2 TABLET ORAL at 09:14

## 2020-06-21 RX ADMIN — ENOXAPARIN SODIUM 40 MG: 100 INJECTION SUBCUTANEOUS at 09:15

## 2020-06-21 RX ADMIN — LEVOTHYROXINE SODIUM 100 MCG: 0.1 TABLET ORAL at 06:30

## 2020-06-21 RX ADMIN — ARFORMOTEROL TARTRATE 15 MCG: 15 SOLUTION RESPIRATORY (INHALATION) at 19:26

## 2020-06-21 ASSESSMENT — PAIN SCALES - GENERAL
PAINLEVEL_OUTOF10: 0

## 2020-06-21 NOTE — PLAN OF CARE
Problem: Falls - Risk of:  Goal: Will remain free from falls  Outcome: Met This Shift     Problem: Falls - Risk of:  Goal: Absence of physical injury  Outcome: Met This Shift     Problem: Breathing Pattern - Ineffective:  Goal: Ability to achieve and maintain a regular respiratory rate will improve  Outcome: Met This Shift

## 2020-06-21 NOTE — PROGRESS NOTES
Hospitalist Progress Note      SYNOPSIS: Patient admitted on  81-year-old male with past medical history of COPD, diastolic congestive heart failure and cardiomyopathy presented on 2020 with shortness of breath. Began 2 days prior to admission. Chronically oxygen dependent on 4 L. Admitted for acute respiratory failure with hypercapnia. Placed on BiPAP and ICU admission initially. SUBJECTIVE:    Patient seen and examined  Records reviewed. Lethargic, hard to keep awake. On BiPAP. Stable overnight. No other overnight issues reported. Temp (24hrs), Av °F (36.7 °C), Min:96.8 °F (36 °C), Max:98.8 °F (37.1 °C)    DIET: DIET CARDIAC; Low Sodium (2 GM); Daily Fluid Restriction: 1500 ml  CODE: Full Code    Intake/Output Summary (Last 24 hours) at 2020 0923  Last data filed at 2020 0900  Gross per 24 hour   Intake 595 ml   Output 3325 ml   Net -2730 ml       OBJECTIVE:    BP (!) 136/102   Pulse 112   Temp 96.8 °F (36 °C) (Temporal)   Resp 25   Ht 6' (1.829 m)   Wt 283 lb 15.2 oz (128.8 kg)   SpO2 96%   BMI 38.51 kg/m²     General appearance: No acute distress, breathing without difficulty  HEENT: Normocephalic, atraumatic. EOMI  Neck: Supple. Trachea midline  Respiratory: Clear to auscultation bilaterally  Cardiovascular: Regular rate rhythm, normal S1-S2  Abdomen: Soft, nontender, nondistended  Musculoskeletal: Normal range of motion, no deformities  Neurologic: awake, alert and following commands     ASSESSMENT:  1. Acute decompensated diastolic congestive heart failure  2. COPD exacerbation  3. Acute hypoxic/hypercapnic respiratory failure secondary to #1 and #2  4. Acute encephalopathy 2/2 CO2 retention, resolving  5. Atrial fibrillation with RVR, resolved  6. Chronic atrial fibrillation  7. Hypothyroidism  8. Morbid obesity     PLAN:  1. Repeat ABG shows increased PCO2, was 85.5 now 115.4  2. BiPAP  3. Critical care consult  4.   Pulmicort twice daily  5. Solu-Medrol 40 mg every 8 hours  6. DuoNebs every 4 hours  7. Furosemide 40 mg 3 times daily  8. Aldactone 25 mg daily  9. Continue Cardura, levothyroxine, metoprolol    Medications:  REVIEWED DAILY    Infusion Medications   Scheduled Medications    acetaZOLAMIDE  250 mg Oral Daily    furosemide  40 mg Intravenous TID    potassium chloride  20 mEq Oral BID WC    levothyroxine  100 mcg Oral Daily    spironolactone  25 mg Oral Daily    doxazosin  2 mg Oral Daily    pantoprazole  40 mg Oral BID AC    methylPREDNISolone  40 mg Intravenous Q8H    sodium chloride flush  10 mL Intravenous 2 times per day    enoxaparin  40 mg Subcutaneous Daily    ipratropium-albuterol  1 ampule Inhalation Q4H WA    budesonide  1 mg Nebulization BID    Arformoterol Tartrate  15 mcg Nebulization BID    metoprolol tartrate  25 mg Oral BID     PRN Meds: docusate sodium, acetaminophen, magnesium hydroxide, sodium chloride flush, acetaminophen **OR** acetaminophen, polyethylene glycol, promethazine **OR** ondansetron, ipratropium-albuterol, sodium chloride    Labs:     Recent Labs     06/19/20 0220 06/20/20  0340 06/21/20  0600   WBC 8.0 8.0 10.6   HGB 15.1 14.4 15.1   HCT 53.7 50.9 53.4    163 171       Recent Labs     06/20/20  0340 06/20/20  1715 06/21/20  0600    144 146   K 4.4  4.4 4.5 4.6  4.6   CL 92* 92* 90*   CO2 42* 46* 42*   BUN 37* 45* 50*   CREATININE 1.4* 1.4* 1.5*   CALCIUM 9.2 9.4 9.3       Recent Labs     06/19/20  0220 06/20/20  0340 06/21/20  0600   PROT 8.6* 7.5 8.2   ALKPHOS 109 96 90   ALT 33 26 27   AST 41* 30 29   BILITOT 1.6* 1.0 0.9       No results for input(s): INR in the last 72 hours.     Recent Labs     06/19/20  0220 06/19/20  1855   TROPONINI <0.01 <0.01       Chronic labs:    Lab Results   Component Value Date    CHOL 116 03/05/2016    TRIG 51 03/05/2016    HDL 51 03/05/2016    LDLCALC 55 03/05/2016    TSH 1.090 06/19/2020    INR 1.3 12/07/2018    LABA1C 6.3 (H) 03/05/2016       Radiology: REVIEWED DAILY    +++++++++++++++++++++++++++++++++++++++++++++++++  [address] [place] [address]  +++++++++++++++++++++++++++++++++++++++++++++++++  NOTE: This report was transcribed using voice recognition software. Every effort was made to ensure accuracy; however, inadvertent computerized transcription errors may be present.

## 2020-06-21 NOTE — PROGRESS NOTES
[address] [facility]   Department of Internal Medicine   Internal Medicine Residency  MICU Progress Note    Patient:  Felicita Sandoval 70 y.o. male   MRN: [id number]       Date of Service: 2020    Allergy: Amlodipine; Isosorbide mononitrate [isosorbide nitrate]; and Lisinopril    CC: f/u resp failure    Subjective     Patient was seen and examined in AM. Refused bipap overnight.   -3.5 L urine last 24 hours, net -7.5 this admission. 24 hour change: refused bipap. Objective     TEMPERATURE:  Current - Temp: 96.8 °F (36 °C); Max - Temp  Av °F (36.7 °C)  Min: 96.8 °F (36 °C)  Max: 98.8 °F (37.1 °C)  RESPIRATIONS RANGE: Resp  Av.6  Min: 14  Max: 43  PULSE RANGE: Pulse  Av.7  Min: 96  Max: 131  BLOOD PRESSURE RANGE:  Systolic (64DAH), XTP:585 , Min:92 , FQX:967   ; Diastolic (57VFQ), XCT:91, Min:68, Max:136    PULSE OXIMETRY RANGE: SpO2  Av.4 %  Min: 82 %  Max: 97 %    I & O - 24hr:    Intake/Output Summary (Last 24 hours) at 2020 0949  Last data filed at 2020 0900  Gross per 24 hour   Intake 595 ml   Output 3325 ml   Net -2730 ml     I/O last 3 completed shifts: In: 595 [P.O.:595]  Out: 3575 [JBNKA:3092] I/O this shift:  In: -   Out: 150 [Urine:150]   Weight change: -6 lb 9.8 oz (-3 kg)    Physical Exam:  General Appearance:    Alert and speaking, somewhat confused compared to prior days    HEENT:    NC/AT, mucous membranes are moist   Neck:   Supple, no jugular venous distention. Resp:   Diminished, with b/l wheezes and crackles, improving   Heart:    irreg irreg, S1 and S2 normal, no murmur, rub or gallop.     Abdomen:     Soft, non-tender, non-distended with normal bowel sounds   Extremities:   Atraumatic, no cyanosis, chronic discoloration BLE, edema improved in BLE   Pulses:  Radial and pedal pulses are intact bilaterally   Neurologic:   Alert and speaking, somewhat confused compared to prior days       Medications     Continuous Infusions:  Scheduled Meds:   acetaZOLAMIDE  250 mg Oral Daily    furosemide  40 mg Intravenous TID    potassium chloride  20 mEq Oral BID WC    levothyroxine  100 mcg Oral Daily    spironolactone  25 mg Oral Daily    doxazosin  2 mg Oral Daily    pantoprazole  40 mg Oral BID AC    methylPREDNISolone  40 mg Intravenous Q8H    sodium chloride flush  10 mL Intravenous 2 times per day    enoxaparin  40 mg Subcutaneous Daily    ipratropium-albuterol  1 ampule Inhalation Q4H WA    budesonide  1 mg Nebulization BID    Arformoterol Tartrate  15 mcg Nebulization BID    metoprolol tartrate  25 mg Oral BID     PRN Meds: sodium chloride (Inhalant), docusate sodium, acetaminophen, magnesium hydroxide, sodium chloride flush, acetaminophen **OR** acetaminophen, polyethylene glycol, promethazine **OR** ondansetron, ipratropium-albuterol, sodium chloride  Nutrition:   cardiac    Labs and Imaging Studies     CBC:   Recent Labs     06/19/20  0220 06/20/20  0340 06/21/20  0600   WBC 8.0 8.0 10.6   HGB 15.1 14.4 15.1   HCT 53.7 50.9 53.4   .7* 103.2* 104.5*    163 171       BMP:    Recent Labs     06/20/20  0340 06/20/20  1715 06/21/20  0600    144 146   K 4.4  4.4 4.5 4.6  4.6   CL 92* 92* 90*   CO2 42* 46* 42*   BUN 37* 45* 50*   CREATININE 1.4* 1.4* 1.5*   GLUCOSE 131* 166* 133*       LIVER PROFILE:   Recent Labs     06/19/20  0220 06/20/20  0340 06/21/20  0600   AST 41* 30 29   ALT 33 26 27   BILITOT 1.6* 1.0 0.9   ALKPHOS 109 96 90       PT/INR:   No results for input(s): PROTIME, INR in the last 72 hours. APTT:   No results for input(s): APTT in the last 72 hours.     Fasting Lipid Panel:    Lab Results   Component Value Date    CHOL 116 03/05/2016    TRIG 51 03/05/2016    HDL 51 03/05/2016       Cardiac Enzymes:    Lab Results   Component Value Date    TROPONINI <0.01 06/19/2020    TROPONINI <0.01 06/19/2020    TROPONINI <0.01 12/07/2018       Notable Cultures:      Blood cultures   Blood Culture, Routine   Date Value Ref Range Status   06/19/2020 24 Hours no growth  Preliminary     Respiratory cultures No results found for: RESPCULTURE No results found for: LABGRAM  Urine   Urine Culture, Routine   Date Value Ref Range Status   06/19/2020 <10,000 CFU/mL  Proteus species    Final     Legionella No results found for: LABLEGI  C Diff PCR No results found for: CDIFPCR  Wound culture/abscess: No results for input(s): WNDABS in the last 72 hours. Tip culture:No results for input(s): CXCATHTIP in the last 72 hours. Antibiotic  Days  Day started                                Oxygen:     Vent Information  Skin Assessment: Clean, dry, & intact  Vt Ordered: 585 mL  FiO2 : 40 %  SpO2: 96 %  I Time/ I Time %: 0.9 s  Additional Respiratory  Assessments  Pulse: 112  Resp: 25  SpO2: 96 %     Nasal cannula L/min     Face mask %     Reservoirs mask %       ABG   Vent Settings     PH   Mode      PCO2   TV      PO2   RR      HCO3   PS      Sat%   PEEP      FIO2   FIO2        P/F          Lines:  Site  Day  Date inserted     TLC              PICC              Arterial line              Peripheral line              HD cath            Urethral Catheter Non-latex; Temperature probe-Output (mL): 75 mL    Imaging Studies:    XR CHEST PORTABLE   Final Result      Atelectasis and/or consolidation within the mid and lower lungs   bilaterally in the setting of cardiomegaly, possibly representing   pulmonary edema. A multifocal or viral pneumonia would be difficult to   exclude. XR CHEST PORTABLE   Final Result   CHF with developing edema. Superimposed pneumonia is considered.                   Resident's Assessment and Plan   Neurology  Acute encephalopathy, resolved  2/2 CO2 retention    Cardiovascular  Acute decompensated CHF  Presents with worsening SOB x2 days  Diminished breath sounds  BNP elevated around 5000, CXR with CHF and developing edema  Received 20 lasix in ED, duonebs and 125 solumedrol  Echo with EF 55%, mild concentric

## 2020-06-22 LAB
ANION GAP SERPL CALCULATED.3IONS-SCNC: 9 MMOL/L (ref 7–16)
BUN BLDV-MCNC: 52 MG/DL (ref 8–23)
CALCIUM SERPL-MCNC: 8.9 MG/DL (ref 8.6–10.2)
CHLORIDE BLD-SCNC: 91 MMOL/L (ref 98–107)
CO2: 44 MMOL/L (ref 22–29)
CREAT SERPL-MCNC: 1.5 MG/DL (ref 0.7–1.2)
GFR AFRICAN AMERICAN: 55
GFR NON-AFRICAN AMERICAN: 46 ML/MIN/1.73
GLUCOSE BLD-MCNC: 127 MG/DL (ref 74–99)
HCT VFR BLD CALC: 51.6 % (ref 37–54)
HEMOGLOBIN: 14.7 G/DL (ref 12.5–16.5)
MCH RBC QN AUTO: 29.3 PG (ref 26–35)
MCHC RBC AUTO-ENTMCNC: 28.5 % (ref 32–34.5)
MCV RBC AUTO: 103 FL (ref 80–99.9)
PDW BLD-RTO: 16.9 FL (ref 11.5–15)
PLATELET # BLD: 152 E9/L (ref 130–450)
PMV BLD AUTO: 12.3 FL (ref 7–12)
POTASSIUM REFLEX MAGNESIUM: 4.4 MMOL/L (ref 3.5–5)
RBC # BLD: 5.01 E12/L (ref 3.8–5.8)
SODIUM BLD-SCNC: 144 MMOL/L (ref 132–146)
WBC # BLD: 9.2 E9/L (ref 4.5–11.5)

## 2020-06-22 PROCEDURE — 97166 OT EVAL MOD COMPLEX 45 MIN: CPT

## 2020-06-22 PROCEDURE — 6360000002 HC RX W HCPCS: Performed by: INTERNAL MEDICINE

## 2020-06-22 PROCEDURE — 80048 BASIC METABOLIC PNL TOTAL CA: CPT

## 2020-06-22 PROCEDURE — 2060000000 HC ICU INTERMEDIATE R&B

## 2020-06-22 PROCEDURE — 6370000000 HC RX 637 (ALT 250 FOR IP): Performed by: INTERNAL MEDICINE

## 2020-06-22 PROCEDURE — 97535 SELF CARE MNGMENT TRAINING: CPT

## 2020-06-22 PROCEDURE — 97162 PT EVAL MOD COMPLEX 30 MIN: CPT

## 2020-06-22 PROCEDURE — 97530 THERAPEUTIC ACTIVITIES: CPT

## 2020-06-22 PROCEDURE — 94640 AIRWAY INHALATION TREATMENT: CPT

## 2020-06-22 PROCEDURE — 85027 COMPLETE CBC AUTOMATED: CPT

## 2020-06-22 PROCEDURE — 99233 SBSQ HOSP IP/OBS HIGH 50: CPT | Performed by: INTERNAL MEDICINE

## 2020-06-22 PROCEDURE — 36415 COLL VENOUS BLD VENIPUNCTURE: CPT

## 2020-06-22 PROCEDURE — 94660 CPAP INITIATION&MGMT: CPT

## 2020-06-22 PROCEDURE — 2580000003 HC RX 258: Performed by: INTERNAL MEDICINE

## 2020-06-22 PROCEDURE — 2700000000 HC OXYGEN THERAPY PER DAY

## 2020-06-22 RX ORDER — FLUTICASONE PROPIONATE 50 MCG
1 SPRAY, SUSPENSION (ML) NASAL DAILY
Status: DISCONTINUED | OUTPATIENT
Start: 2020-06-22 | End: 2020-06-27 | Stop reason: HOSPADM

## 2020-06-22 RX ADMIN — METOPROLOL TARTRATE 25 MG: 25 TABLET, FILM COATED ORAL at 20:43

## 2020-06-22 RX ADMIN — ACETAZOLAMIDE 250 MG: 250 TABLET ORAL at 08:05

## 2020-06-22 RX ADMIN — SPIRONOLACTONE 25 MG: 25 TABLET ORAL at 08:04

## 2020-06-22 RX ADMIN — LEVOTHYROXINE SODIUM 100 MCG: 0.1 TABLET ORAL at 06:17

## 2020-06-22 RX ADMIN — IPRATROPIUM BROMIDE AND ALBUTEROL SULFATE 1 AMPULE: 2.5; .5 SOLUTION RESPIRATORY (INHALATION) at 21:31

## 2020-06-22 RX ADMIN — PANTOPRAZOLE SODIUM 40 MG: 40 TABLET, DELAYED RELEASE ORAL at 16:46

## 2020-06-22 RX ADMIN — ENOXAPARIN SODIUM 40 MG: 100 INJECTION SUBCUTANEOUS at 09:04

## 2020-06-22 RX ADMIN — FLUTICASONE PROPIONATE 1 SPRAY: 50 SPRAY, METERED NASAL at 09:50

## 2020-06-22 RX ADMIN — POTASSIUM CHLORIDE 20 MEQ: 20 TABLET, EXTENDED RELEASE ORAL at 08:06

## 2020-06-22 RX ADMIN — IPRATROPIUM BROMIDE AND ALBUTEROL SULFATE 1 AMPULE: 2.5; .5 SOLUTION RESPIRATORY (INHALATION) at 17:06

## 2020-06-22 RX ADMIN — IPRATROPIUM BROMIDE AND ALBUTEROL SULFATE 1 AMPULE: 2.5; .5 SOLUTION RESPIRATORY (INHALATION) at 12:45

## 2020-06-22 RX ADMIN — FUROSEMIDE 40 MG: 10 INJECTION, SOLUTION INTRAMUSCULAR; INTRAVENOUS at 20:43

## 2020-06-22 RX ADMIN — Medication 10 ML: at 08:04

## 2020-06-22 RX ADMIN — IPRATROPIUM BROMIDE AND ALBUTEROL SULFATE 1 AMPULE: 2.5; .5 SOLUTION RESPIRATORY (INHALATION) at 08:39

## 2020-06-22 RX ADMIN — DOXAZOSIN 2 MG: 2 TABLET ORAL at 08:04

## 2020-06-22 RX ADMIN — BUDESONIDE 1000 MCG: 0.5 SUSPENSION RESPIRATORY (INHALATION) at 21:31

## 2020-06-22 RX ADMIN — ARFORMOTEROL TARTRATE 15 MCG: 15 SOLUTION RESPIRATORY (INHALATION) at 21:31

## 2020-06-22 RX ADMIN — FUROSEMIDE 40 MG: 10 INJECTION, SOLUTION INTRAMUSCULAR; INTRAVENOUS at 08:04

## 2020-06-22 RX ADMIN — PANTOPRAZOLE SODIUM 40 MG: 40 TABLET, DELAYED RELEASE ORAL at 06:17

## 2020-06-22 RX ADMIN — POTASSIUM CHLORIDE 20 MEQ: 20 TABLET, EXTENDED RELEASE ORAL at 16:45

## 2020-06-22 RX ADMIN — FUROSEMIDE 40 MG: 10 INJECTION, SOLUTION INTRAMUSCULAR; INTRAVENOUS at 14:50

## 2020-06-22 RX ADMIN — Medication 10 ML: at 20:44

## 2020-06-22 RX ADMIN — ARFORMOTEROL TARTRATE 15 MCG: 15 SOLUTION RESPIRATORY (INHALATION) at 08:37

## 2020-06-22 RX ADMIN — METHYLPREDNISOLONE SODIUM SUCCINATE 40 MG: 40 INJECTION, POWDER, FOR SOLUTION INTRAMUSCULAR; INTRAVENOUS at 20:43

## 2020-06-22 RX ADMIN — SODIUM CHLORIDE, PRESERVATIVE FREE 10 ML: 5 INJECTION INTRAVENOUS at 03:30

## 2020-06-22 RX ADMIN — METOPROLOL TARTRATE 25 MG: 25 TABLET, FILM COATED ORAL at 08:04

## 2020-06-22 RX ADMIN — BUDESONIDE 1000 MCG: 0.5 SUSPENSION RESPIRATORY (INHALATION) at 08:38

## 2020-06-22 RX ADMIN — METHYLPREDNISOLONE SODIUM SUCCINATE 40 MG: 40 INJECTION, POWDER, FOR SOLUTION INTRAMUSCULAR; INTRAVENOUS at 10:03

## 2020-06-22 RX ADMIN — METHYLPREDNISOLONE SODIUM SUCCINATE 40 MG: 40 INJECTION, POWDER, FOR SOLUTION INTRAMUSCULAR; INTRAVENOUS at 03:30

## 2020-06-22 ASSESSMENT — PAIN SCALES - GENERAL
PAINLEVEL_OUTOF10: 0

## 2020-06-22 NOTE — PLAN OF CARE
Problem: Falls - Risk of:  Goal: Will remain free from falls  Outcome: Met This Shift     Problem: Falls - Risk of:  Goal: Absence of physical injury  Outcome: Met This Shift

## 2020-06-22 NOTE — PROGRESS NOTES
P Quality Flow/Interdisciplinary Rounds Progress Note        Quality Flow Rounds held on June 22, 2020    Disciplines Attending:  [facility]    Ingrid Phillips was admitted on 6/19/2020  1:48 AM    Anticipated Discharge Date:  Expected Discharge Date: 06/22/20    Disposition:    Timoteo Score:  Timoteo Scale Score: 16    Readmission Risk              Risk of Unplanned Readmission:        18           Discussed patient goal for the day, patient clinical progression, and barriers to discharge.   The following Goal(s) of the Day/Commitment(s) have been identified:  Continue Bipap/treatment      Frandy Merrill  June 22, 2020

## 2020-06-22 NOTE — PROGRESS NOTES
[facility] [address] [facility]   Department of Internal Medicine   Internal Medicine Residency  MICU Progress Note    Patient:  Juliano Tabor 70 y.o. male   MRN: [id number]       Date of Service: 2020    Allergy: Amlodipine; Isosorbide mononitrate [isosorbide nitrate]; and Lisinopril    CC: f/u resp failure    Subjective     Patient was seen and examined in AM. Placed in BIPAP overnight due to CO2 retention. 24 hour change: congested nose. No other complaints    Objective     TEMPERATURE:  Current - Temp: 97 °F (36.1 °C); Max - Temp  Av.9 °F (36.6 °C)  Min: 97 °F (36.1 °C)  Max: 98.7 °F (37.1 °C)  RESPIRATIONS RANGE: Resp  Av.7  Min: 5  Max: 32  PULSE RANGE: Pulse  Av.5  Min: 84  Max: 127  BLOOD PRESSURE RANGE:  Systolic (49KAK), OXE:096 , Min:107 , LX   ; Diastolic (80WDQ), WKD:18, Min:74, Max:121    PULSE OXIMETRY RANGE: SpO2  Av.8 %  Min: 88 %  Max: 97 %    I & O - 24hr:    Intake/Output Summary (Last 24 hours) at 2020 1332  Last data filed at 2020 1000  Gross per 24 hour   Intake 360 ml   Output 3545 ml   Net -3185 ml     I/O last 3 completed shifts: In: 720 [P.O.:720]  Out: 4025 [Urine:4025] I/O this shift:  In: -   Out: 195 [Urine:195]   Weight change: -10 lb 9.3 oz (-4.8 kg)    Physical Exam:  General Appearance:    Alert and speaking, somewhat confused compared to prior days    HEENT:    NC/AT, mucous membranes are moist   Neck:   Supple, no jugular venous distention. Resp:   Diminished, with b/l wheezes and crackles, improving   Heart:    irreg irreg, S1 and S2 normal, no murmur, rub or gallop.     Abdomen:     Soft, non-tender, non-distended with normal bowel sounds   Extremities:   Atraumatic, no cyanosis, chronic discoloration BLE, edema improved in BLE   Pulses:  Radial and pedal pulses are intact bilaterally   Neurologic:   Alert and speaking, somewhat confused compared to prior days       Medications     Continuous Infusions:  Scheduled Meds:   fluticasone 1 spray Each Nostril Daily    acetaZOLAMIDE  250 mg Oral Daily    furosemide  40 mg Intravenous TID    potassium chloride  20 mEq Oral BID WC    levothyroxine  100 mcg Oral Daily    spironolactone  25 mg Oral Daily    doxazosin  2 mg Oral Daily    pantoprazole  40 mg Oral BID AC    methylPREDNISolone  40 mg Intravenous Q8H    sodium chloride flush  10 mL Intravenous 2 times per day    enoxaparin  40 mg Subcutaneous Daily    ipratropium-albuterol  1 ampule Inhalation Q4H WA    budesonide  1 mg Nebulization BID    Arformoterol Tartrate  15 mcg Nebulization BID    metoprolol tartrate  25 mg Oral BID     PRN Meds: sodium chloride (Inhalant), docusate sodium, acetaminophen, magnesium hydroxide, sodium chloride flush, acetaminophen **OR** acetaminophen, polyethylene glycol, promethazine **OR** ondansetron, ipratropium-albuterol, sodium chloride  Nutrition:   cardiac    Labs and Imaging Studies     CBC:   Recent Labs     06/20/20  0340 06/21/20  0600 06/22/20  0445   WBC 8.0 10.6 9.2   HGB 14.4 15.1 14.7   HCT 50.9 53.4 51.6   .2* 104.5* 103.0*    171 152       BMP:    Recent Labs     06/20/20  1715 06/21/20  0600 06/22/20  0445    146 144   K 4.5 4.6  4.6 4.4   CL 92* 90* 91*   CO2 46* 42* 44*   BUN 45* 50* 52*   CREATININE 1.4* 1.5* 1.5*   GLUCOSE 166* 133* 127*       LIVER PROFILE:   Recent Labs     06/20/20  0340 06/21/20  0600   AST 30 29   ALT 26 27   BILITOT 1.0 0.9   ALKPHOS 96 90       PT/INR:   No results for input(s): PROTIME, INR in the last 72 hours. APTT:   No results for input(s): APTT in the last 72 hours.     Fasting Lipid Panel:    Lab Results   Component Value Date    CHOL 116 03/05/2016    TRIG 51 03/05/2016    HDL 51 03/05/2016       Cardiac Enzymes:    Lab Results   Component Value Date    TROPONINI <0.01 06/19/2020    TROPONINI <0.01 06/19/2020    TROPONINI <0.01 12/07/2018       Notable Cultures:      Blood cultures   Blood Culture, Routine   Date Value Ref Range

## 2020-06-22 NOTE — PROGRESS NOTES
Physical Therapy    Physical Therapy Initial Assessment     Name: Marlen Amor  : 1945  MRN: [id number]    Referring Provider:  Sajan Wilkerson MD    Date of Service: 2020    Evaluating PT:  Dora Brad, PT, DPT [id number]     Room #:  3506/1534-I  Diagnosis:  Acute on chronic respiratory failure   PMHx/PSHx:  AAA, anemia, cardiomyopathy, carpal tunnel syndrome, CHF, COPD, GERD, hx of colonic polyps, HTN, hypothyroidism, rectal bleeding, respiratory neoplasm, sleep apnea, tobacco abuse   Precautions:  Falls, O2  Equipment Needs:  Bariatric [employer]    SUBJECTIVE:    Pt lives alone in a 1.5 story home with 4 stairs to enter and 1 rail. Bedroom is on the 1st level but there are 7 steps with 1 rail downstairs to shower. Pt ambulated with [employer] PRN PTA. States he uses [employer] when able but it's too big for his home. Independent at baseline. Wears home O2. OBJECTIVE:   Initial Evaluation  Date: 20 Treatment Short Term/ Long Term   Goals   AM-PAC 6 Clicks 51/04     Was pt agreeable to Eval/treatment? Yes      Does pt have pain? Reports chronic pain in LLE d/t hx of ankle fx     Bed Mobility  Rolling: SBA  Supine to sit: SBA  Sit to supine: NT  Scooting: SBA  Rolling: Independent   Supine to sit: Independent   Sit to supine: Independent   Scooting: Independent    Transfers Sit to stand: SBA  Stand to sit: SBA  Stand pivot: SBA with bariatric [employer]  Sit to stand: Independent   Stand to sit:  Independent   Stand pivot: Modified Independent with AAD   Ambulation    40 feet with bariatric [employer] Min A  >100 feet with AAD Modified Independent     Stair negotiation: ascended and descended  NT  >4 steps with 1 rail Modified Independent     ROM BUE:  Per OT eval  BLE:  WFL     Strength BUE:  Per OT eval   BLE:  4+/5      Balance Sitting EOB:  SBA  Dynamic Standing:  Min A with [employer]   Sitting EOB:  Independent   Dynamic Standing:  Modified Independent       Pt is A & O x 4  RASS:  0  CAM-ICU:  Negative   Sensation:  Pt reports

## 2020-06-22 NOTE — PROGRESS NOTES
treatment session. Overall, patient demonstrates mild to moderate difficulties with completion of BADLs and IADLs. Factors contributing to these difficulties include impaired cognition, decreased endurance, and generalized weakness. Required co-session with PT due to medical acuity and level of physical assistance needed. As noted above, patient likely to benefit from further OT intervention to increase independence, safety, and overall quality of life. Treatment:  · Bed mobility: Facilitated bed mobility with cues for proper body mechanics and sequencing to prepare for ADL completion. · Functional transfers: Facilitated transfers from various surfaces with cues for body alignment, safety and hand placement. · ADL completion: Self-care retraining for the above-mentioned ADLs; training on proper hand placement, safety technique, sequencing, and energy conservation techniques. · Postural Balance: Sitting and standing balance retraining to improve righting reactions with postural changes during ADLS. · Cognitive/Perceptual training: retraining exercises to improve attention, mentation, and spatial awareness for ADLs & transfers. · Skilled positioning: Proper positioning to improve interaction with environment, overall functioning and decrease/prevent edema and contractures. · Delirium Prevention/Management: Implementation of non-pharmacologic interventions for delirium prevention incorporated throughout session to patient. Including environmental and sensory modifications to decrease patient's internal distraction caused by delirium, and improve overall mentation. Eval Complexity:   · Medium Complexity  · History: Expanded review of medical records and additional review of physical, cognitive, or psychosocial history related to current functional performance  · Exam: 3+ performance deficits  · Assistance/Modification: Min/mod assistance or modifications required to perform tasks.  May have comorbidities that affect occupational performance. Assessment of current deficits   Functional mobility [x]  ADLs [x] Strength [x]  Cognition []  Functional transfers  [x] IADLs [x] Safety Awareness [x]  Endurance [x]  Fine Motor Coordination [] Balance [x] Vision/perception [] Sensation [x]   Gross Motor Coordination [] ROM [] Delirium []                  Motor Control []    Plan of Care: 1-5 tx/wk PRN   ADL retraining [x]   Equipment needs [x]   Neuromuscular re-education [] Energy Conservation Techniques [x]  Functional Transfer training [x] Patient and/or Family Education [x]  Functional Mobility training [x]  Environmental Modifications [x]  Cognitive re-training []   Compensatory techniques for ADLs [x]  Splinting Needs []   Positioning to improve overall function [x]   Therapeutic Activity [x]                       Therapeutic Exercise  [x]  Visual/Perceptual: []    Delirium prevention/treatment  [x]   Other:  []    Rehab Potential: Good for established goals    Patient / Family Goal: None stated     Patient and/or family were instructed/educated on diagnosis, prognosis/goals and plan of care. Demonstrated F understanding, further information may be needed. [] Malnutrition indicators have been identified and nursing has been notified to ensure a dietitian consult is ordered.         (Evaluation time includes thorough review of current medical information, gathering information on past medical history/social history and prior level of function, completion of standardized testing/informal observation of tasks, assessment of data and development of POC/Goals.)    Medium Complexity Evaluation +  Treatment Time In: 1055            Treatment Time Out: 1145               Treatment Charges: Mins Units   Ther Ex  12079     Manual Therapy 92766     Thera Activities 23533 37 2   ADL/Home Mgt 50354 13 1   Neuro Re-ed 07783     Group Therapy      Orthotic manage/training  03348     Non-Billable Time     Total Timed Treatment 50 3

## 2020-06-22 NOTE — PROGRESS NOTES
Date: 6/22/2020    Time: 12:34 AM    Patient Placed On BIPAP/CPAP/ Non-Invasive Ventilation? Yes    If no must comment. Facial area red/color change? No           If YES are Blister/Lesion present? No   If yes must notify nursing staff  BIPAP/CPAP skin barrier?   Yes    Skin barrier type:duoderm       Comments:15/6/50%        Julieth Sheppard

## 2020-06-23 LAB
ANION GAP SERPL CALCULATED.3IONS-SCNC: 7 MMOL/L (ref 7–16)
B.E.: 19.9 MMOL/L (ref -3–0)
BUN BLDV-MCNC: 57 MG/DL (ref 8–23)
CALCIUM SERPL-MCNC: 9 MG/DL (ref 8.6–10.2)
CHLORIDE BLD-SCNC: 89 MMOL/L (ref 98–107)
CO2: 50 MMOL/L (ref 22–29)
CREAT SERPL-MCNC: 1.4 MG/DL (ref 0.7–1.2)
CRITICAL NOTIFICATION: YES
DELIVERY SYSTEMS: ABNORMAL
DEVICE: ABNORMAL
GFR AFRICAN AMERICAN: >60
GFR NON-AFRICAN AMERICAN: 50 ML/MIN/1.73
GLUCOSE BLD-MCNC: 136 MG/DL (ref 74–99)
HCO3 ARTERIAL: 56.4 MMOL/L (ref 22–26)
HCT VFR BLD CALC: 51.8 % (ref 37–54)
HEMOGLOBIN: 14.8 G/DL (ref 12.5–16.5)
MCH RBC QN AUTO: 29.7 PG (ref 26–35)
MCHC RBC AUTO-ENTMCNC: 28.6 % (ref 32–34.5)
MCV RBC AUTO: 103.8 FL (ref 80–99.9)
O2 SATURATION: 74.3 % (ref 92–98.5)
OPERATOR ID: 925
PATIENT TEMP: 37
PCO2 (TEMP CORRECTED): 121.7 MMHG (ref 35–45)
PDW BLD-RTO: 16.8 FL (ref 11.5–15)
PH (TEMPERATURE CORRECTED): 7.27 (ref 7.35–7.45)
PLATELET # BLD: 147 E9/L (ref 130–450)
PMV BLD AUTO: 12.6 FL (ref 7–12)
PO2 (TEMP CORRECTED): 49.9 MMHG (ref 60–80)
POTASSIUM REFLEX MAGNESIUM: 4.5 MMOL/L (ref 3.5–5)
RBC # BLD: 4.99 E12/L (ref 3.8–5.8)
SODIUM BLD-SCNC: 146 MMOL/L (ref 132–146)
SOURCE, BLOOD GAS: ABNORMAL
WBC # BLD: 9.7 E9/L (ref 4.5–11.5)

## 2020-06-23 PROCEDURE — 97530 THERAPEUTIC ACTIVITIES: CPT

## 2020-06-23 PROCEDURE — 2580000003 HC RX 258: Performed by: INTERNAL MEDICINE

## 2020-06-23 PROCEDURE — 6370000000 HC RX 637 (ALT 250 FOR IP): Performed by: INTERNAL MEDICINE

## 2020-06-23 PROCEDURE — 94760 N-INVAS EAR/PLS OXIMETRY 1: CPT

## 2020-06-23 PROCEDURE — 6360000002 HC RX W HCPCS: Performed by: INTERNAL MEDICINE

## 2020-06-23 PROCEDURE — 2060000000 HC ICU INTERMEDIATE R&B

## 2020-06-23 PROCEDURE — 97535 SELF CARE MNGMENT TRAINING: CPT

## 2020-06-23 PROCEDURE — 2700000000 HC OXYGEN THERAPY PER DAY

## 2020-06-23 PROCEDURE — 80048 BASIC METABOLIC PNL TOTAL CA: CPT

## 2020-06-23 PROCEDURE — 82803 BLOOD GASES ANY COMBINATION: CPT

## 2020-06-23 PROCEDURE — 85027 COMPLETE CBC AUTOMATED: CPT

## 2020-06-23 PROCEDURE — 94640 AIRWAY INHALATION TREATMENT: CPT

## 2020-06-23 PROCEDURE — 36415 COLL VENOUS BLD VENIPUNCTURE: CPT

## 2020-06-23 PROCEDURE — 6370000000 HC RX 637 (ALT 250 FOR IP): Performed by: FAMILY MEDICINE

## 2020-06-23 PROCEDURE — 9990000010 HC NO CHARGE VISIT: Performed by: AUDIOLOGIST

## 2020-06-23 PROCEDURE — 94660 CPAP INITIATION&MGMT: CPT

## 2020-06-23 RX ORDER — DOCUSATE SODIUM 100 MG/1
100 CAPSULE, LIQUID FILLED ORAL 2 TIMES DAILY
Status: DISCONTINUED | OUTPATIENT
Start: 2020-06-24 | End: 2020-06-27 | Stop reason: HOSPADM

## 2020-06-23 RX ORDER — METHYLPREDNISOLONE SODIUM SUCCINATE 40 MG/ML
40 INJECTION, POWDER, LYOPHILIZED, FOR SOLUTION INTRAMUSCULAR; INTRAVENOUS EVERY 12 HOURS
Status: DISCONTINUED | OUTPATIENT
Start: 2020-06-23 | End: 2020-06-24

## 2020-06-23 RX ORDER — NYSTATIN 100000 U/G
CREAM TOPICAL 2 TIMES DAILY
Status: DISCONTINUED | OUTPATIENT
Start: 2020-06-23 | End: 2020-06-27 | Stop reason: HOSPADM

## 2020-06-23 RX ORDER — POLYETHYLENE GLYCOL 3350 17 G/17G
17 POWDER, FOR SOLUTION ORAL DAILY
Status: DISCONTINUED | OUTPATIENT
Start: 2020-06-24 | End: 2020-06-26

## 2020-06-23 RX ORDER — FUROSEMIDE 10 MG/ML
40 INJECTION INTRAMUSCULAR; INTRAVENOUS 2 TIMES DAILY
Status: DISCONTINUED | OUTPATIENT
Start: 2020-06-24 | End: 2020-06-27 | Stop reason: HOSPADM

## 2020-06-23 RX ORDER — FUROSEMIDE 10 MG/ML
INJECTION INTRAMUSCULAR; INTRAVENOUS
Status: DISCONTINUED
Start: 2020-06-23 | End: 2020-06-24

## 2020-06-23 RX ADMIN — METHYLPREDNISOLONE SODIUM SUCCINATE 40 MG: 40 INJECTION, POWDER, FOR SOLUTION INTRAMUSCULAR; INTRAVENOUS at 02:37

## 2020-06-23 RX ADMIN — METOPROLOL TARTRATE 25 MG: 25 TABLET, FILM COATED ORAL at 22:12

## 2020-06-23 RX ADMIN — LEVOTHYROXINE SODIUM 100 MCG: 0.1 TABLET ORAL at 06:41

## 2020-06-23 RX ADMIN — PANTOPRAZOLE SODIUM 40 MG: 40 TABLET, DELAYED RELEASE ORAL at 06:41

## 2020-06-23 RX ADMIN — FUROSEMIDE 40 MG: 10 INJECTION, SOLUTION INTRAMUSCULAR; INTRAVENOUS at 16:44

## 2020-06-23 RX ADMIN — ENOXAPARIN SODIUM 40 MG: 100 INJECTION SUBCUTANEOUS at 07:36

## 2020-06-23 RX ADMIN — POTASSIUM CHLORIDE 20 MEQ: 20 TABLET, EXTENDED RELEASE ORAL at 17:18

## 2020-06-23 RX ADMIN — BUDESONIDE 1000 MCG: 0.5 SUSPENSION RESPIRATORY (INHALATION) at 21:05

## 2020-06-23 RX ADMIN — METHYLPREDNISOLONE SODIUM SUCCINATE 40 MG: 40 INJECTION, POWDER, FOR SOLUTION INTRAMUSCULAR; INTRAVENOUS at 22:12

## 2020-06-23 RX ADMIN — METHYLPREDNISOLONE SODIUM SUCCINATE 40 MG: 40 INJECTION, POWDER, FOR SOLUTION INTRAMUSCULAR; INTRAVENOUS at 09:23

## 2020-06-23 RX ADMIN — ACETAZOLAMIDE 250 MG: 250 TABLET ORAL at 07:36

## 2020-06-23 RX ADMIN — Medication 10 ML: at 07:37

## 2020-06-23 RX ADMIN — ARFORMOTEROL TARTRATE 15 MCG: 15 SOLUTION RESPIRATORY (INHALATION) at 21:05

## 2020-06-23 RX ADMIN — POTASSIUM CHLORIDE 20 MEQ: 20 TABLET, EXTENDED RELEASE ORAL at 07:39

## 2020-06-23 RX ADMIN — IPRATROPIUM BROMIDE AND ALBUTEROL SULFATE 1 AMPULE: 2.5; .5 SOLUTION RESPIRATORY (INHALATION) at 15:56

## 2020-06-23 RX ADMIN — BUDESONIDE 1000 MCG: 0.5 SUSPENSION RESPIRATORY (INHALATION) at 09:07

## 2020-06-23 RX ADMIN — FLUTICASONE PROPIONATE 1 SPRAY: 50 SPRAY, METERED NASAL at 07:38

## 2020-06-23 RX ADMIN — FUROSEMIDE 40 MG: 10 INJECTION, SOLUTION INTRAMUSCULAR; INTRAVENOUS at 07:36

## 2020-06-23 RX ADMIN — IPRATROPIUM BROMIDE AND ALBUTEROL SULFATE 1 AMPULE: 2.5; .5 SOLUTION RESPIRATORY (INHALATION) at 21:05

## 2020-06-23 RX ADMIN — NYSTATIN: 100000 CREAM TOPICAL at 22:13

## 2020-06-23 RX ADMIN — METOPROLOL TARTRATE 25 MG: 25 TABLET, FILM COATED ORAL at 07:36

## 2020-06-23 RX ADMIN — SPIRONOLACTONE 25 MG: 25 TABLET ORAL at 07:36

## 2020-06-23 RX ADMIN — PANTOPRAZOLE SODIUM 40 MG: 40 TABLET, DELAYED RELEASE ORAL at 17:18

## 2020-06-23 RX ADMIN — DOXAZOSIN 2 MG: 2 TABLET ORAL at 07:36

## 2020-06-23 RX ADMIN — Medication 10 ML: at 22:12

## 2020-06-23 RX ADMIN — ARFORMOTEROL TARTRATE 15 MCG: 15 SOLUTION RESPIRATORY (INHALATION) at 09:07

## 2020-06-23 RX ADMIN — NYSTATIN: 100000 CREAM TOPICAL at 18:37

## 2020-06-23 RX ADMIN — IPRATROPIUM BROMIDE AND ALBUTEROL SULFATE 1 AMPULE: 2.5; .5 SOLUTION RESPIRATORY (INHALATION) at 09:07

## 2020-06-23 ASSESSMENT — PAIN SCALES - GENERAL
PAINLEVEL_OUTOF10: 0

## 2020-06-23 NOTE — PROGRESS NOTES
06/23/20 0007   NIV Type   $NIV $Daily Charge   Skin Assessment Clean, dry, & intact   Skin Protection for O2 Device Yes   Equipment Type v60   Mode Bilevel   Mask Type Full face mask   Mask Size Medium   Settings/Measurements   IPAP 20 cmH20   CPAP/EPAP 6 cmH2O   Resp 26   Insp Rise Time (%) 2 %   FiO2  50 %   I Time/ I Time % 0.75 s   Vt Exhaled 567 mL   Minute Volume 10 Liters   Mask Leak (lpm) 32 lpm   Date: 6/23/2020    Time: 12:09 AM    Patient Placed On BIPAP/CPAP/ Non-Invasive Ventilation? Yes    If no must comment. Facial area red/color change? No           If YES are Blister/Lesion present? No   If yes must notify nursing staff  BIPAP/CPAP skin barrier?   Yes    Skin barrier type:duoderm       Comments:        Robert Todd

## 2020-06-23 NOTE — PROGRESS NOTES
Allen Biggs M.D.,[facility]  Huy Carr D.O., F.A.C.O.I., [place] M.D. Nicole Romero M.D., Julio Doan M.D. Janki Iqbal D.O. Daily Pulmonary Progress Note    Patient:  Ahmet Ramirez 70 y.o. male MRN: [id number]     Date of Service: 6/23/2020      Synopsis     We are following patient for acute hypoxic and hypercapnic respiratory failure and COPD exacerbation    \"CC\"  : Shortness of breath    Code status: Full code      Subjective      Patient was seen and examined. He is awake alert oriented x3. He is saturating 92 to 94% on 6 L of oxygen. Was compliant with his BiPAP last night. Major complaint is just thick mucus secretions. Mostly are clear. Review of Systems:  Constitutional: Denies fever, weight loss, night sweats, and fatigue  Skin: Denies pigmentation, dark lesions, and rashes   HEENT: Denies hearing loss, tinnitus, ear drainage, epistaxis, sore throat, and hoarseness. Cardiovascular: Denies palpitations, chest pain, and chest pressure. Respiratory: Denies cough, dyspnea at rest, hemoptysis, apnea, and choking.   Gastrointestinal: Denies nausea, vomiting, poor appetite, diarrhea, heartburn or reflux  Genitourinary: Denies dysuria, frequency, urgency or hematuria  Musculoskeletal: Denies myalgias, muscle weakness, and bone pain  Neurological: Denies dizziness, vertigo, headache, and focal weakness  Psychological: Denies anxiety and depression  Endocrine: Denies heat intolerance and cold intolerance  Hematopoietic/Lymphatic: Denies bleeding problems and blood transfusions    24-hour events:      Objective   Vitals: /80   Pulse 94   Temp 97 °F (36.1 °C) (Temporal)   Resp 24   Ht 6' (1.829 m)   Wt 273 lb 2.4 oz (123.9 kg)   SpO2 94%   BMI 37.05 kg/m²     I/O:    Intake/Output Summary (Last 24 hours) at 6/23/2020 1208  Last data filed at 6/23/2020 0900  Gross per 24 hour   Intake 730 ml   Output 2360 ml   Net -1630 ml       Vent Information  Skin Assessment: Clean, dry, & intact  Vt Ordered: 650 mL  FiO2 : 50 %  SpO2: 94 %  SpO2/FiO2 ratio: 192  I Time/ I Time %: 0.75 s       IPAP: 20 cmH20  CPAP/EPAP: 6 cmH2O     CURRENT MEDS :  Scheduled Meds:   fluticasone  1 spray Each Nostril Daily    acetaZOLAMIDE  250 mg Oral Daily    furosemide  40 mg Intravenous TID    potassium chloride  20 mEq Oral BID WC    levothyroxine  100 mcg Oral Daily    spironolactone  25 mg Oral Daily    doxazosin  2 mg Oral Daily    pantoprazole  40 mg Oral BID AC    methylPREDNISolone  40 mg Intravenous Q8H    sodium chloride flush  10 mL Intravenous 2 times per day    enoxaparin  40 mg Subcutaneous Daily    ipratropium-albuterol  1 ampule Inhalation Q4H WA    budesonide  1 mg Nebulization BID    Arformoterol Tartrate  15 mcg Nebulization BID    metoprolol tartrate  25 mg Oral BID       Physical Exam:  General Appearance: appears comfortable in no acute distress. HEENT: Normocephalic atraumatic without obvious abnormality   Neck: Lips, mucosa, and tongue normal.  Supple, symmetrical, trachea midline, no adenopathy;thyroid:  no enlargement/tenderness/nodules or JVD. Lung: Breath sounds CTA. Respirations   unlabored. Symmetrical expansion. Heart: RRR, normal S1, S2. No MRG  Abdomen: Soft, NT, ND. BS present x 4 quadrants. No bruit or organomegaly. Extremities: Pedal pulses 2+ symmetric b/l. Extremities normal, no cyanosis, clubbing, or edema. Has predominant varicose veins in lower extremities  Musculokeletal: No joint swelling, no muscle tenderness. ROM normal in all joints of extremities. Neurologic: Mental status: Alert and Oriented X3 . Pertinent/ New Labs and Imaging Studies     Imaging Personally Reviewed:    06/20/20          Impression       Atelectasis and/or consolidation within the mid and lower lungs   bilaterally in the setting of cardiomegaly, possibly representing   pulmonary edema.  A multifocal or viral pneumonia would be

## 2020-06-23 NOTE — CARE COORDINATION
Patient remains in MICU on 6L hfnc vs bipap 50%; however he has a transfer order and is assigned to room 4507A. Clinicals faxed to Leonie Khalil at [address] as requested. Patient met LTAC criteria; however decided he didn't want want to commit to 2+ weeks at their facility as he's already very concerned about the cost of his hospital stay. His plan is to return home at discharge, and doesn't want to commit to [address] at this point either d/t added expense. On fax sheet to Leonie Khalil I inquired to patient's coverage for [address] through the [place] stating patient is refusing at this point d/t concern for cost; awaiting response. CM/SW will continue to follow for discharge planning.

## 2020-06-23 NOTE — PROGRESS NOTES
Hospitalist Progress Note      SYNOPSIS: Patient admitted on  79-year-old male with past medical history of COPD, diastolic congestive heart failure and cardiomyopathy presented on 2020 with shortness of breath. Began 2 days prior to admission. Chronically oxygen dependent on 4 L. Admitted for acute respiratory failure with hypercapnia. Placed on BiPAP and ICU admission initially. SUBJECTIVE:    Patient seen and examined  Records reviewed. Stable overnight. No other overnight issues reported. Temp (24hrs), Av °F (36.7 °C), Min:97.8 °F (36.6 °C), Max:98.2 °F (36.8 °C)    DIET: DIET CARDIAC; Low Sodium (2 GM); Daily Fluid Restriction: 1500 ml  CODE: Full Code    Intake/Output Summary (Last 24 hours) at 2020 0859  Last data filed at 2020 0530  Gross per 24 hour   Intake 580 ml   Output 2525 ml   Net -1945 ml       OBJECTIVE:    BP (!) 147/116   Pulse 95   Temp 98.2 °F (36.8 °C)   Resp 22   Ht 6' (1.829 m)   Wt 273 lb 2.4 oz (123.9 kg)   SpO2 96%   BMI 37.05 kg/m²     General appearance: No acute distress  HEENT: Normocephalic, atraumatic. EOMI  Neck: Supple. Trachea midline  Respiratory: Clear to auscultation bilaterally  Cardiovascular: Regular rate and rhythm  Abdomen: Soft, nontender, nondistended  Musculoskeletal: Normal range of motion, no deformities  Neurologic: Cranial nerves II to XII grossly intact    ASSESSMENT:  1. Acute decompensated diastolic congestive heart failure  2. COPD exacerbation  3. Acute hypoxic/hypercapnic respiratory failure secondary to #1 and #2  4. Acute encephalopathy 2/2 CO2 retention  5. Atrial fibrillation with RVR, resolved  6. Chronic atrial fibrillation  7. Hypothyroidism  8. Morbid obesity     ABGs obtained this morning revealed worsening/increasing PCO2 of 121.7, with PO2 49.9, pH 7.274    PLAN:  1. Critical care following  2. Pulmonology following  3. BiPAP PRN and nightly  4.   Pulmicort and Brovana twice

## 2020-06-23 NOTE — PROGRESS NOTES
Standing grooming task at sink: face / hand wash and oral care (brushing teeth)     improve grooming/hygiene tasks to SBA while standing at sink    UB Dressing Min A    SBA    Donchano mccracken while seated at EOB     improve UB dressing to set up A   LB Dressing Max A    Max A     improve LB dressing to SBA with AE PRN   Bathing Mod A     Mod A    improve UB/LB bathing to SBA   Toileting Mod A  Mod A    Simulated toileting hygiene task    improve toileting task and all clothing mgmt to SBA   Bed Mobility  Supine to sit: SBA  Sit to supine: NT Min A    improve bed mobility to independent in prep for EOB ADL tasks   Functional Transfers Sit to stand: SBA  Stand to sit: SBA SBA  improve all functional transfers to independent   Functional Mobility Min A maya- ww  For in-room distance  SBA    Ambulated in room with w/w; multiple trials  improve functional mobility to mod I with AE PRN   Balance Sitting:     Static:  independent    Dynamic: SBA  Standing: Min A ww Sitting:     Static:  independent    Dynamic: Sup  Standing: SBA demo independent dynamic sitting balance EOB during ADL tasks   Endurance/Activity Tolerance F+  F demo G- activity tolerance/endurance during 15-20 min ADL task    Visual/  Perceptual Glasses: yes                    Hand dominance: L  UE ROM:        RUE: WFL                   LUE: WFL  Strength:        RUE: grossly 4-/5        LUE: grossly 4-/5   Strength: WFL  Fine Motor Coordination: WFL     Hearing: WFL  Sensation: L foot N/T  Tone: WNL  Edema: unremarkable     Comments: Upon arrival pt supine in bed and agreeable to OT session with PT collaboration.  Therapist facilitated bed mobility, unsupported sitting balance at EOB, functional transfers (various surfaces: bed, chair; sit to stands, stand pivots), standing tolerance tasks (addressing posture, balance and activity tolerance while incorporating light functional reaching) and functional ambulation task with w/w in room (cuing on posture,

## 2020-06-24 ENCOUNTER — APPOINTMENT (OUTPATIENT)
Dept: GENERAL RADIOLOGY | Age: 75
DRG: 291 | End: 2020-06-24
Payer: MEDICARE

## 2020-06-24 LAB
ANION GAP SERPL CALCULATED.3IONS-SCNC: 12 MMOL/L (ref 7–16)
BLOOD CULTURE, ROUTINE: NORMAL
BUN BLDV-MCNC: 61 MG/DL (ref 8–23)
CALCIUM SERPL-MCNC: 9.2 MG/DL (ref 8.6–10.2)
CHLORIDE BLD-SCNC: 89 MMOL/L (ref 98–107)
CO2: 42 MMOL/L (ref 22–29)
CREAT SERPL-MCNC: 1.5 MG/DL (ref 0.7–1.2)
CULTURE, BLOOD 2: NORMAL
GFR AFRICAN AMERICAN: 55
GFR NON-AFRICAN AMERICAN: 46 ML/MIN/1.73
GLUCOSE BLD-MCNC: 130 MG/DL (ref 74–99)
HCT VFR BLD CALC: 55.7 % (ref 37–54)
HEMOGLOBIN: 15.9 G/DL (ref 12.5–16.5)
MCH RBC QN AUTO: 29.3 PG (ref 26–35)
MCHC RBC AUTO-ENTMCNC: 28.5 % (ref 32–34.5)
MCV RBC AUTO: 102.6 FL (ref 80–99.9)
PDW BLD-RTO: 16.9 FL (ref 11.5–15)
PLATELET # BLD: 141 E9/L (ref 130–450)
PMV BLD AUTO: 12.7 FL (ref 7–12)
POTASSIUM REFLEX MAGNESIUM: 5 MMOL/L (ref 3.5–5)
RBC # BLD: 5.43 E12/L (ref 3.8–5.8)
SODIUM BLD-SCNC: 143 MMOL/L (ref 132–146)
WBC # BLD: 14 E9/L (ref 4.5–11.5)

## 2020-06-24 PROCEDURE — 6370000000 HC RX 637 (ALT 250 FOR IP): Performed by: INTERNAL MEDICINE

## 2020-06-24 PROCEDURE — 2700000000 HC OXYGEN THERAPY PER DAY

## 2020-06-24 PROCEDURE — 36415 COLL VENOUS BLD VENIPUNCTURE: CPT

## 2020-06-24 PROCEDURE — 85027 COMPLETE CBC AUTOMATED: CPT

## 2020-06-24 PROCEDURE — 2580000003 HC RX 258: Performed by: INTERNAL MEDICINE

## 2020-06-24 PROCEDURE — 97530 THERAPEUTIC ACTIVITIES: CPT

## 2020-06-24 PROCEDURE — 6360000002 HC RX W HCPCS: Performed by: INTERNAL MEDICINE

## 2020-06-24 PROCEDURE — 80048 BASIC METABOLIC PNL TOTAL CA: CPT

## 2020-06-24 PROCEDURE — 94660 CPAP INITIATION&MGMT: CPT

## 2020-06-24 PROCEDURE — 6370000000 HC RX 637 (ALT 250 FOR IP): Performed by: NURSE PRACTITIONER

## 2020-06-24 PROCEDURE — 71045 X-RAY EXAM CHEST 1 VIEW: CPT

## 2020-06-24 PROCEDURE — 2060000000 HC ICU INTERMEDIATE R&B

## 2020-06-24 RX ORDER — SENNA AND DOCUSATE SODIUM 50; 8.6 MG/1; MG/1
2 TABLET, FILM COATED ORAL DAILY PRN
Status: DISCONTINUED | OUTPATIENT
Start: 2020-06-24 | End: 2020-06-27 | Stop reason: HOSPADM

## 2020-06-24 RX ORDER — PREDNISONE 20 MG/1
40 TABLET ORAL DAILY
Status: DISCONTINUED | OUTPATIENT
Start: 2020-06-24 | End: 2020-06-27 | Stop reason: HOSPADM

## 2020-06-24 RX ADMIN — PANTOPRAZOLE SODIUM 40 MG: 40 TABLET, DELAYED RELEASE ORAL at 09:29

## 2020-06-24 RX ADMIN — SENNOSIDES AND DOCUSATE SODIUM 2 TABLET: 8.6; 5 TABLET ORAL at 16:00

## 2020-06-24 RX ADMIN — METOPROLOL TARTRATE 25 MG: 25 TABLET, FILM COATED ORAL at 09:28

## 2020-06-24 RX ADMIN — NYSTATIN: 100000 CREAM TOPICAL at 21:26

## 2020-06-24 RX ADMIN — DOCUSATE SODIUM 100 MG: 100 CAPSULE, LIQUID FILLED ORAL at 21:25

## 2020-06-24 RX ADMIN — MAGNESIUM HYDROXIDE 30 ML: 2400 SUSPENSION ORAL at 00:03

## 2020-06-24 RX ADMIN — POTASSIUM CHLORIDE 20 MEQ: 20 TABLET, EXTENDED RELEASE ORAL at 09:29

## 2020-06-24 RX ADMIN — ARFORMOTEROL TARTRATE 15 MCG: 15 SOLUTION RESPIRATORY (INHALATION) at 20:30

## 2020-06-24 RX ADMIN — Medication 10 ML: at 09:29

## 2020-06-24 RX ADMIN — SODIUM CHLORIDE, PRESERVATIVE FREE 10 ML: 5 INJECTION INTRAVENOUS at 18:38

## 2020-06-24 RX ADMIN — Medication 10 ML: at 21:25

## 2020-06-24 RX ADMIN — BISACODYL 5 MG: 5 TABLET, COATED ORAL at 00:03

## 2020-06-24 RX ADMIN — FUROSEMIDE 40 MG: 10 INJECTION INTRAMUSCULAR; INTRAVENOUS at 18:39

## 2020-06-24 RX ADMIN — IPRATROPIUM BROMIDE AND ALBUTEROL SULFATE 1 AMPULE: 2.5; .5 SOLUTION RESPIRATORY (INHALATION) at 20:30

## 2020-06-24 RX ADMIN — ENOXAPARIN SODIUM 40 MG: 100 INJECTION SUBCUTANEOUS at 09:33

## 2020-06-24 RX ADMIN — SPIRONOLACTONE 25 MG: 25 TABLET ORAL at 09:34

## 2020-06-24 RX ADMIN — FUROSEMIDE 40 MG: 10 INJECTION INTRAMUSCULAR; INTRAVENOUS at 09:28

## 2020-06-24 RX ADMIN — POLYETHYLENE GLYCOL 3350 17 G: 17 POWDER, FOR SOLUTION ORAL at 09:29

## 2020-06-24 RX ADMIN — FLUTICASONE PROPIONATE 1 SPRAY: 50 SPRAY, METERED NASAL at 09:28

## 2020-06-24 RX ADMIN — DOCUSATE SODIUM 100 MG: 100 CAPSULE, LIQUID FILLED ORAL at 09:28

## 2020-06-24 RX ADMIN — METHYLPREDNISOLONE SODIUM SUCCINATE 40 MG: 40 INJECTION, POWDER, FOR SOLUTION INTRAMUSCULAR; INTRAVENOUS at 09:00

## 2020-06-24 RX ADMIN — NYSTATIN: 100000 CREAM TOPICAL at 09:28

## 2020-06-24 RX ADMIN — POTASSIUM CHLORIDE 20 MEQ: 20 TABLET, EXTENDED RELEASE ORAL at 16:11

## 2020-06-24 RX ADMIN — DOXAZOSIN 2 MG: 2 TABLET ORAL at 09:33

## 2020-06-24 RX ADMIN — PANTOPRAZOLE SODIUM 40 MG: 40 TABLET, DELAYED RELEASE ORAL at 16:11

## 2020-06-24 RX ADMIN — LEVOTHYROXINE SODIUM 100 MCG: 0.1 TABLET ORAL at 09:33

## 2020-06-24 RX ADMIN — ACETAZOLAMIDE 250 MG: 250 TABLET ORAL at 09:33

## 2020-06-24 RX ADMIN — BUDESONIDE 1000 MCG: 0.5 SUSPENSION RESPIRATORY (INHALATION) at 20:25

## 2020-06-24 RX ADMIN — METOPROLOL TARTRATE 25 MG: 25 TABLET, FILM COATED ORAL at 21:25

## 2020-06-24 RX ADMIN — MAGNESIUM HYDROXIDE 30 ML: 2400 SUSPENSION ORAL at 09:31

## 2020-06-24 ASSESSMENT — PAIN SCALES - GENERAL
PAINLEVEL_OUTOF10: 0
PAINLEVEL_OUTOF10: 0

## 2020-06-24 NOTE — PROGRESS NOTES
03/05/2016       Radiology: REVIEWED DAILY    +++++++++++++++++++++++++++++++++++++++++++++++++  [place] [address] [place] [address]  +++++++++++++++++++++++++++++++++++++++++++++++++  NOTE: This report was transcribed using voice recognition software. Every effort was made to ensure accuracy; however, inadvertent computerized transcription errors may be present.

## 2020-06-24 NOTE — CARE COORDINATION
Spoke with patient. He continues to plan for home at discharge. Refusing homecare. He plans to follow up at the [place] and get homecare through [place] if he needs it. He also sees a [place] pulmonologist at [facility] and is aware he needs to follow up there for possible bipap needs. Typically on 2-3 liters of oxygen at home. He will have friend provide transportation and tells me that they can get his portable oxygen tank when they pick him up and bring it to the hospital. He has a portable concentrator. All oxygen through [place]. I did discuss with him if he wants any med changes through the [place] he will need to follow up with his physician at the [place] before they will be covered. He is aware of this process and will make an appointment as soon as possible when released. For questions I can be reached at [phone].  Kimi Chavez [place]

## 2020-06-25 LAB
ANION GAP SERPL CALCULATED.3IONS-SCNC: 9 MMOL/L (ref 7–16)
BUN BLDV-MCNC: 58 MG/DL (ref 8–23)
CALCIUM SERPL-MCNC: 9.3 MG/DL (ref 8.6–10.2)
CHLORIDE BLD-SCNC: 90 MMOL/L (ref 98–107)
CO2: 47 MMOL/L (ref 22–29)
CREAT SERPL-MCNC: 1.6 MG/DL (ref 0.7–1.2)
GFR AFRICAN AMERICAN: 51
GFR NON-AFRICAN AMERICAN: 43 ML/MIN/1.73
GLUCOSE BLD-MCNC: 106 MG/DL (ref 74–99)
HCT VFR BLD CALC: 56.1 % (ref 37–54)
HEMOGLOBIN: 15.9 G/DL (ref 12.5–16.5)
MCH RBC QN AUTO: 29.2 PG (ref 26–35)
MCHC RBC AUTO-ENTMCNC: 28.3 % (ref 32–34.5)
MCV RBC AUTO: 102.9 FL (ref 80–99.9)
PDW BLD-RTO: 17.2 FL (ref 11.5–15)
PLATELET # BLD: 127 E9/L (ref 130–450)
PMV BLD AUTO: 12.9 FL (ref 7–12)
POTASSIUM REFLEX MAGNESIUM: 5.5 MMOL/L (ref 3.5–5)
RBC # BLD: 5.45 E12/L (ref 3.8–5.8)
SODIUM BLD-SCNC: 146 MMOL/L (ref 132–146)
WBC # BLD: 13.9 E9/L (ref 4.5–11.5)

## 2020-06-25 PROCEDURE — 2580000003 HC RX 258: Performed by: INTERNAL MEDICINE

## 2020-06-25 PROCEDURE — 94640 AIRWAY INHALATION TREATMENT: CPT

## 2020-06-25 PROCEDURE — 6370000000 HC RX 637 (ALT 250 FOR IP): Performed by: INTERNAL MEDICINE

## 2020-06-25 PROCEDURE — 85027 COMPLETE CBC AUTOMATED: CPT

## 2020-06-25 PROCEDURE — 6360000002 HC RX W HCPCS: Performed by: INTERNAL MEDICINE

## 2020-06-25 PROCEDURE — 2700000000 HC OXYGEN THERAPY PER DAY

## 2020-06-25 PROCEDURE — 6370000000 HC RX 637 (ALT 250 FOR IP): Performed by: CLINICAL NURSE SPECIALIST

## 2020-06-25 PROCEDURE — 94660 CPAP INITIATION&MGMT: CPT

## 2020-06-25 PROCEDURE — 97535 SELF CARE MNGMENT TRAINING: CPT

## 2020-06-25 PROCEDURE — 6370000000 HC RX 637 (ALT 250 FOR IP): Performed by: SURGERY

## 2020-06-25 PROCEDURE — 80048 BASIC METABOLIC PNL TOTAL CA: CPT

## 2020-06-25 PROCEDURE — 36415 COLL VENOUS BLD VENIPUNCTURE: CPT

## 2020-06-25 PROCEDURE — 2060000000 HC ICU INTERMEDIATE R&B

## 2020-06-25 PROCEDURE — 99221 1ST HOSP IP/OBS SF/LOW 40: CPT | Performed by: SURGERY

## 2020-06-25 RX ORDER — HYDROCORTISONE 25 MG/G
CREAM TOPICAL 2 TIMES DAILY
Status: DISCONTINUED | OUTPATIENT
Start: 2020-06-25 | End: 2020-06-25

## 2020-06-25 RX ORDER — LACTULOSE 10 G/15ML
20 SOLUTION ORAL 3 TIMES DAILY
Status: DISCONTINUED | OUTPATIENT
Start: 2020-06-25 | End: 2020-06-27 | Stop reason: HOSPADM

## 2020-06-25 RX ORDER — HYDROCORTISONE 25 MG/G
CREAM TOPICAL 3 TIMES DAILY
Status: DISCONTINUED | OUTPATIENT
Start: 2020-06-25 | End: 2020-06-27 | Stop reason: HOSPADM

## 2020-06-25 RX ORDER — POLYETHYLENE GLYCOL 3350 17 G/17G
17 POWDER, FOR SOLUTION ORAL DAILY
Qty: 510 G | Refills: 0 | Status: SHIPPED | OUTPATIENT
Start: 2020-06-25 | End: 2020-06-27 | Stop reason: SDUPTHER

## 2020-06-25 RX ORDER — HYDROCORTISONE 25 MG/G
CREAM TOPICAL
Qty: 1 TUBE | Refills: 2 | Status: SHIPPED | OUTPATIENT
Start: 2020-06-25 | End: 2020-06-27 | Stop reason: SDUPTHER

## 2020-06-25 RX ADMIN — DOXAZOSIN 2 MG: 2 TABLET ORAL at 12:01

## 2020-06-25 RX ADMIN — HYDROCORTISONE: 25 CREAM TOPICAL at 21:34

## 2020-06-25 RX ADMIN — METOPROLOL TARTRATE 25 MG: 25 TABLET, FILM COATED ORAL at 21:34

## 2020-06-25 RX ADMIN — ARFORMOTEROL TARTRATE 15 MCG: 15 SOLUTION RESPIRATORY (INHALATION) at 19:40

## 2020-06-25 RX ADMIN — POLYETHYLENE GLYCOL 3350 17 G: 17 POWDER, FOR SOLUTION ORAL at 09:39

## 2020-06-25 RX ADMIN — LEVOTHYROXINE SODIUM 100 MCG: 0.1 TABLET ORAL at 07:56

## 2020-06-25 RX ADMIN — IPRATROPIUM BROMIDE AND ALBUTEROL SULFATE 1 AMPULE: 2.5; .5 SOLUTION RESPIRATORY (INHALATION) at 15:53

## 2020-06-25 RX ADMIN — Medication 10 ML: at 21:35

## 2020-06-25 RX ADMIN — FUROSEMIDE 40 MG: 10 INJECTION INTRAMUSCULAR; INTRAVENOUS at 16:28

## 2020-06-25 RX ADMIN — IPRATROPIUM BROMIDE AND ALBUTEROL SULFATE 1 AMPULE: 2.5; .5 SOLUTION RESPIRATORY (INHALATION) at 19:40

## 2020-06-25 RX ADMIN — POTASSIUM CHLORIDE 20 MEQ: 20 TABLET, EXTENDED RELEASE ORAL at 09:35

## 2020-06-25 RX ADMIN — HYDROCORTISONE: 25 CREAM TOPICAL at 15:10

## 2020-06-25 RX ADMIN — FLUTICASONE PROPIONATE 1 SPRAY: 50 SPRAY, METERED NASAL at 09:37

## 2020-06-25 RX ADMIN — PREDNISONE 40 MG: 20 TABLET ORAL at 12:01

## 2020-06-25 RX ADMIN — LACTULOSE 20 G: 20 SOLUTION ORAL at 15:09

## 2020-06-25 RX ADMIN — NYSTATIN: 100000 CREAM TOPICAL at 21:34

## 2020-06-25 RX ADMIN — NYSTATIN: 100000 CREAM TOPICAL at 09:38

## 2020-06-25 RX ADMIN — SODIUM CHLORIDE, PRESERVATIVE FREE 10 ML: 5 INJECTION INTRAVENOUS at 16:28

## 2020-06-25 RX ADMIN — SPIRONOLACTONE 25 MG: 25 TABLET ORAL at 12:02

## 2020-06-25 RX ADMIN — DOCUSATE SODIUM 100 MG: 100 CAPSULE, LIQUID FILLED ORAL at 21:34

## 2020-06-25 RX ADMIN — BUDESONIDE 1000 MCG: 0.5 SUSPENSION RESPIRATORY (INHALATION) at 09:09

## 2020-06-25 RX ADMIN — PANTOPRAZOLE SODIUM 40 MG: 40 TABLET, DELAYED RELEASE ORAL at 06:53

## 2020-06-25 RX ADMIN — ARFORMOTEROL TARTRATE 15 MCG: 15 SOLUTION RESPIRATORY (INHALATION) at 08:45

## 2020-06-25 RX ADMIN — LACTULOSE 20 G: 20 SOLUTION ORAL at 21:34

## 2020-06-25 RX ADMIN — DOCUSATE SODIUM 100 MG: 100 CAPSULE, LIQUID FILLED ORAL at 09:36

## 2020-06-25 RX ADMIN — BUDESONIDE 1000 MCG: 0.5 SUSPENSION RESPIRATORY (INHALATION) at 19:40

## 2020-06-25 RX ADMIN — FUROSEMIDE 40 MG: 10 INJECTION INTRAMUSCULAR; INTRAVENOUS at 09:37

## 2020-06-25 RX ADMIN — METOPROLOL TARTRATE 25 MG: 25 TABLET, FILM COATED ORAL at 09:38

## 2020-06-25 RX ADMIN — ENOXAPARIN SODIUM 40 MG: 100 INJECTION SUBCUTANEOUS at 09:36

## 2020-06-25 RX ADMIN — Medication 10 ML: at 09:39

## 2020-06-25 RX ADMIN — PANTOPRAZOLE SODIUM 40 MG: 40 TABLET, DELAYED RELEASE ORAL at 16:27

## 2020-06-25 RX ADMIN — IPRATROPIUM BROMIDE AND ALBUTEROL SULFATE 1 AMPULE: 2.5; .5 SOLUTION RESPIRATORY (INHALATION) at 08:45

## 2020-06-25 RX ADMIN — ACETAZOLAMIDE 250 MG: 250 TABLET ORAL at 12:00

## 2020-06-25 RX ADMIN — IPRATROPIUM BROMIDE AND ALBUTEROL SULFATE 1 AMPULE: 2.5; .5 SOLUTION RESPIRATORY (INHALATION) at 12:07

## 2020-06-25 ASSESSMENT — PAIN SCALES - GENERAL
PAINLEVEL_OUTOF10: 0
PAINLEVEL_OUTOF10: 2
PAINLEVEL_OUTOF10: 5

## 2020-06-25 NOTE — CARE COORDINATION
Plan remains home at discharge. Declined homecare. Has portable tank and walker at home. [facility] needs does not want to use his medicare benefit. For questions I can be reached at [phone].  Carolee Romero

## 2020-06-25 NOTE — PROGRESS NOTES
From surgery POV nothing more to add,   Bowel reg and topical agents already prescribed. I will s/o, if there are any issues don't hesitate to call.     Zeke Cota MD

## 2020-06-25 NOTE — PROGRESS NOTES
edema. A multifocal or viral pneumonia would be difficult to   exclude.           ECHO   Summary   Ejection fraction is visually estimated at 55%. No regional wall motion abnormalities seen. Mild left ventricular concentric hypertrophy noted. Dilated right ventricle with reduced function. TAPSE is 1.1 cm. Left atrial volume index of 34 ml per meters squared BSA. Physiologic and/or trace mitral regurgitation is present. Mild tricuspid regurgitation. Pulmonary hypertension is moderate. RVSP is 58 mmHg. There is a small localized near left ventricle pericardial effusion noted. Signature      ----------------------------------------------------------------   Electronically signed by Barbra Goins DO(Interpreting   physician) on 06/19/2020 12:43 PM   ----------------------------------------------------------------         Labs:  Lab Results   Component Value Date    WBC 13.9 06/25/2020    HGB 15.9 06/25/2020    HCT 56.1 06/25/2020    .9 06/25/2020    MCH 29.2 06/25/2020    MCHC 28.3 06/25/2020    RDW 17.2 06/25/2020     06/25/2020    MPV 12.9 06/25/2020     Lab Results   Component Value Date     06/25/2020    K 5.5 06/25/2020    CL 90 06/25/2020    CO2 47 06/25/2020    BUN 58 06/25/2020    CREATININE 1.6 06/25/2020    LABALBU 3.6 06/21/2020    CALCIUM 9.3 06/25/2020    GFRAA 51 06/25/2020    LABGLOM 43 06/25/2020     Lab Results   Component Value Date    PROTIME 15.5 12/07/2018    INR 1.3 12/07/2018        Assessment:    1. Acute hypoxic and hypercapnic respiratory failure most likely secondary to CHF and COPD exacerbation  2. Acute decompensated CHF  3. Acute encephalopathy-resolved  4. Moderate pulmonary hypertension most likely secondary to CHF and underlying untreated ZOE  5. Hypothyroidism  6. Pt is on home oxygen 1-3 L       Plan:   1. Continue BiPAP PRN and nightly   2. Continue bronchodilators Pulmicort and Brovana twice daily and duo nebs every 4 hours straight.

## 2020-06-25 NOTE — PROGRESS NOTES
Daily    nystatin   Topical BID    furosemide  40 mg Intravenous BID    polyethylene glycol  17 g Oral Daily    docusate sodium  100 mg Oral BID    fluticasone  1 spray Each Nostril Daily    acetaZOLAMIDE  250 mg Oral Daily    potassium chloride  20 mEq Oral BID WC    levothyroxine  100 mcg Oral Daily    spironolactone  25 mg Oral Daily    doxazosin  2 mg Oral Daily    pantoprazole  40 mg Oral BID AC    sodium chloride flush  10 mL Intravenous 2 times per day    enoxaparin  40 mg Subcutaneous Daily    ipratropium-albuterol  1 ampule Inhalation Q4H WA    budesonide  1 mg Nebulization BID    Arformoterol Tartrate  15 mcg Nebulization BID    metoprolol tartrate  25 mg Oral BID     PRN Meds: sennosides-docusate sodium, bisacodyl, sodium chloride (Inhalant), acetaminophen, magnesium hydroxide, sodium chloride flush, acetaminophen **OR** acetaminophen, promethazine **OR** ondansetron, sodium chloride    Labs:     Recent Labs     06/23/20  0400 06/24/20  0502 06/25/20  0518   WBC 9.7 14.0* 13.9*   HGB 14.8 15.9 15.9   HCT 51.8 55.7* 56.1*    141 127*       Recent Labs     06/23/20  0400 06/24/20  0502 06/25/20  0518    143 146   K 4.5 5.0 5.5*   CL 89* 89* 90*   CO2 50* 42* 47*   BUN 57* 61* 58*   CREATININE 1.4* 1.5* 1.6*   CALCIUM 9.0 9.2 9.3       No results for input(s): PROT, ALB, ALKPHOS, ALT, AST, BILITOT, AMYLASE, LIPASE in the last 72 hours. No results for input(s): INR in the last 72 hours. No results for input(s): Terisa Needles in the last 72 hours.     Chronic labs:    Lab Results   Component Value Date    CHOL 116 03/05/2016    TRIG 51 03/05/2016    HDL 51 03/05/2016    LDLCALC 55 03/05/2016    TSH 1.090 06/19/2020    INR 1.3 12/07/2018    LABA1C 6.3 (H) 03/05/2016       Radiology: REVIEWED DAILY    +++++++++++++++++++++++++++++++++++++++++++++++++  Sriram Jay,

## 2020-06-26 LAB
ANION GAP SERPL CALCULATED.3IONS-SCNC: 11 MMOL/L (ref 7–16)
BUN BLDV-MCNC: 55 MG/DL (ref 8–23)
CALCIUM SERPL-MCNC: 9.4 MG/DL (ref 8.6–10.2)
CHLORIDE BLD-SCNC: 89 MMOL/L (ref 98–107)
CO2: 43 MMOL/L (ref 22–29)
CREAT SERPL-MCNC: 1.5 MG/DL (ref 0.7–1.2)
GFR AFRICAN AMERICAN: 55
GFR NON-AFRICAN AMERICAN: 46 ML/MIN/1.73
GLUCOSE BLD-MCNC: 102 MG/DL (ref 74–99)
HCT VFR BLD CALC: 53.7 % (ref 37–54)
HEMOGLOBIN: 15.8 G/DL (ref 12.5–16.5)
MCH RBC QN AUTO: 29.3 PG (ref 26–35)
MCHC RBC AUTO-ENTMCNC: 29.4 % (ref 32–34.5)
MCV RBC AUTO: 99.4 FL (ref 80–99.9)
PDW BLD-RTO: 17.1 FL (ref 11.5–15)
PLATELET # BLD: 128 E9/L (ref 130–450)
PMV BLD AUTO: 12.1 FL (ref 7–12)
POTASSIUM REFLEX MAGNESIUM: 4.8 MMOL/L (ref 3.5–5)
RBC # BLD: 5.4 E12/L (ref 3.8–5.8)
SODIUM BLD-SCNC: 143 MMOL/L (ref 132–146)
WBC # BLD: 13.2 E9/L (ref 4.5–11.5)

## 2020-06-26 PROCEDURE — 85027 COMPLETE CBC AUTOMATED: CPT

## 2020-06-26 PROCEDURE — 6370000000 HC RX 637 (ALT 250 FOR IP): Performed by: INTERNAL MEDICINE

## 2020-06-26 PROCEDURE — 94660 CPAP INITIATION&MGMT: CPT

## 2020-06-26 PROCEDURE — 6360000002 HC RX W HCPCS: Performed by: INTERNAL MEDICINE

## 2020-06-26 PROCEDURE — 94640 AIRWAY INHALATION TREATMENT: CPT

## 2020-06-26 PROCEDURE — 2700000000 HC OXYGEN THERAPY PER DAY

## 2020-06-26 PROCEDURE — 36415 COLL VENOUS BLD VENIPUNCTURE: CPT

## 2020-06-26 PROCEDURE — 2580000003 HC RX 258: Performed by: INTERNAL MEDICINE

## 2020-06-26 PROCEDURE — 80048 BASIC METABOLIC PNL TOTAL CA: CPT

## 2020-06-26 PROCEDURE — 6370000000 HC RX 637 (ALT 250 FOR IP): Performed by: HOSPITALIST

## 2020-06-26 PROCEDURE — 2060000000 HC ICU INTERMEDIATE R&B

## 2020-06-26 PROCEDURE — 6370000000 HC RX 637 (ALT 250 FOR IP): Performed by: CLINICAL NURSE SPECIALIST

## 2020-06-26 RX ORDER — DIAPER,BRIEF,INFANT-TODD,DISP
EACH MISCELLANEOUS 2 TIMES DAILY
Status: DISCONTINUED | OUTPATIENT
Start: 2020-06-26 | End: 2020-06-26

## 2020-06-26 RX ORDER — POLYETHYLENE GLYCOL 3350 17 G/17G
17 POWDER, FOR SOLUTION ORAL DAILY
Status: DISCONTINUED | OUTPATIENT
Start: 2020-06-26 | End: 2020-06-27 | Stop reason: HOSPADM

## 2020-06-26 RX ADMIN — ENOXAPARIN SODIUM 40 MG: 100 INJECTION SUBCUTANEOUS at 09:37

## 2020-06-26 RX ADMIN — FLUTICASONE PROPIONATE 1 SPRAY: 50 SPRAY, METERED NASAL at 09:45

## 2020-06-26 RX ADMIN — POLYETHYLENE GLYCOL 3350 17 G: 17 POWDER, FOR SOLUTION ORAL at 09:36

## 2020-06-26 RX ADMIN — HYDROCORTISONE: 25 CREAM TOPICAL at 21:01

## 2020-06-26 RX ADMIN — HYDROCORTISONE: 25 CREAM TOPICAL at 09:46

## 2020-06-26 RX ADMIN — POLYETHYLENE GLYCOL 3350 17 G: 17 POWDER, FOR SOLUTION ORAL at 15:59

## 2020-06-26 RX ADMIN — ACETAZOLAMIDE 250 MG: 250 TABLET ORAL at 09:37

## 2020-06-26 RX ADMIN — METOPROLOL TARTRATE 25 MG: 25 TABLET, FILM COATED ORAL at 09:37

## 2020-06-26 RX ADMIN — METOPROLOL TARTRATE 25 MG: 25 TABLET, FILM COATED ORAL at 21:02

## 2020-06-26 RX ADMIN — NYSTATIN: 100000 CREAM TOPICAL at 21:02

## 2020-06-26 RX ADMIN — IPRATROPIUM BROMIDE AND ALBUTEROL SULFATE 1 AMPULE: 2.5; .5 SOLUTION RESPIRATORY (INHALATION) at 17:00

## 2020-06-26 RX ADMIN — DOCUSATE SODIUM 100 MG: 100 CAPSULE, LIQUID FILLED ORAL at 09:37

## 2020-06-26 RX ADMIN — ARFORMOTEROL TARTRATE 15 MCG: 15 SOLUTION RESPIRATORY (INHALATION) at 20:18

## 2020-06-26 RX ADMIN — SPIRONOLACTONE 25 MG: 25 TABLET ORAL at 09:36

## 2020-06-26 RX ADMIN — PREDNISONE 40 MG: 20 TABLET ORAL at 09:36

## 2020-06-26 RX ADMIN — PANTOPRAZOLE SODIUM 40 MG: 40 TABLET, DELAYED RELEASE ORAL at 12:34

## 2020-06-26 RX ADMIN — IPRATROPIUM BROMIDE AND ALBUTEROL SULFATE 1 AMPULE: 2.5; .5 SOLUTION RESPIRATORY (INHALATION) at 20:18

## 2020-06-26 RX ADMIN — LEVOTHYROXINE SODIUM 100 MCG: 0.1 TABLET ORAL at 12:34

## 2020-06-26 RX ADMIN — DOXAZOSIN 2 MG: 2 TABLET ORAL at 09:37

## 2020-06-26 RX ADMIN — FUROSEMIDE 40 MG: 10 INJECTION INTRAMUSCULAR; INTRAVENOUS at 18:00

## 2020-06-26 RX ADMIN — PANTOPRAZOLE SODIUM 40 MG: 40 TABLET, DELAYED RELEASE ORAL at 15:59

## 2020-06-26 RX ADMIN — LACTULOSE 20 G: 20 SOLUTION ORAL at 09:36

## 2020-06-26 RX ADMIN — IPRATROPIUM BROMIDE AND ALBUTEROL SULFATE 1 AMPULE: 2.5; .5 SOLUTION RESPIRATORY (INHALATION) at 13:20

## 2020-06-26 RX ADMIN — ARFORMOTEROL TARTRATE 15 MCG: 15 SOLUTION RESPIRATORY (INHALATION) at 09:05

## 2020-06-26 RX ADMIN — HYDROCORTISONE: 25 CREAM TOPICAL at 14:00

## 2020-06-26 RX ADMIN — Medication 10 ML: at 21:03

## 2020-06-26 RX ADMIN — NYSTATIN: 100000 CREAM TOPICAL at 09:46

## 2020-06-26 RX ADMIN — FUROSEMIDE 40 MG: 10 INJECTION INTRAMUSCULAR; INTRAVENOUS at 09:37

## 2020-06-26 RX ADMIN — DOCUSATE SODIUM 100 MG: 100 CAPSULE, LIQUID FILLED ORAL at 20:59

## 2020-06-26 RX ADMIN — BUDESONIDE 1000 MCG: 0.5 SUSPENSION RESPIRATORY (INHALATION) at 20:17

## 2020-06-26 RX ADMIN — IPRATROPIUM BROMIDE AND ALBUTEROL SULFATE 1 AMPULE: 2.5; .5 SOLUTION RESPIRATORY (INHALATION) at 09:05

## 2020-06-26 RX ADMIN — Medication 10 ML: at 09:45

## 2020-06-26 RX ADMIN — BUDESONIDE 1000 MCG: 0.5 SUSPENSION RESPIRATORY (INHALATION) at 09:05

## 2020-06-26 NOTE — PROGRESS NOTES
Attempted nasal mask with noninvasive ventilation and patient c/o too much air and air blowing out mouth. Then nasal prongs attempted and patient said no it felt like he couldn't breathe. Please advise.

## 2020-06-26 NOTE — PROGRESS NOTES
Physical Therapy  Facility/Department: [address] PICU  Daily Treatment Note  NAME: Nayeli Roa  : 1945  MRN: [id number]    Date of Service: 2020    Physical therapy orders received/treatment attempted and chart review completed. Patient refused due to \"having bowel issues\" and not able to be seen. Will attempt at a later time/date as able. Thank you for the opportunity to assist in the care of this patient.     Vince Eastno, PT, DPT  License [id number]

## 2020-06-26 NOTE — PROGRESS NOTES
Type and Reason for Visit: RD Nutrition Re-Screen    Nutrition Screen:   · Have you recently lost weight without trying? - 0 to 1 pound (0 points)   · Have you been eating poorly because of a decreased appetite? - No (0 points)   · Malnutrition Screening Tool Score - 0    Dietitian Assessment of Nutrition Re-Screen: RD re-screen negative.  PO intake stable since admit         Electronically signed by Johnny Hartley RD, DIANNA on 6/26/20 at 10:58 AM EDT    Contact Number: [phone]

## 2020-06-26 NOTE — PROGRESS NOTES
bleeding  -H/H in am     3. Acute decompensated diastolic congestive heart failure  -Continue current diuretics  -In/Out, daily weights    4. Acute encephalopathy 2/2 CO2 retention  -Resolved    5. Atrial fibrillation with RVR  - resolved  -Recommend outpatient cardiology follow up    6. Chronic atrial fibrillation  -Continue BB  -Not sure why not on AC, f/u with primary cardiology      DVT Prophylaxis: SCD  Diet: DIET CARDIAC; Low Sodium (2 GM); Daily Fluid Restriction: 1500 ml  Code Status: Full Code    PT/OT Eval Status:  In progress    Dispo - Home, patient declined Sandrine Gaspar MD

## 2020-06-26 NOTE — PROGRESS NOTES
Occupational Therapy     Date:2020  Patient Name: Dayanara Serrano  MRN: [id number]  : 1945  Room: [address] [place] [address]A       Reviewed chart and attempted to treat pt however pt politely declined due to BM issues. Will attempt at later date/time.        Shaw Allen 46, [facility]

## 2020-06-27 VITALS
TEMPERATURE: 96.7 F | HEART RATE: 88 BPM | DIASTOLIC BLOOD PRESSURE: 78 MMHG | RESPIRATION RATE: 20 BRPM | SYSTOLIC BLOOD PRESSURE: 116 MMHG | WEIGHT: 242 LBS | HEIGHT: 72 IN | OXYGEN SATURATION: 100 % | BODY MASS INDEX: 32.78 KG/M2

## 2020-06-27 LAB
ANION GAP SERPL CALCULATED.3IONS-SCNC: 14 MMOL/L (ref 7–16)
BUN BLDV-MCNC: 50 MG/DL (ref 8–23)
CALCIUM SERPL-MCNC: 9.2 MG/DL (ref 8.6–10.2)
CHLORIDE BLD-SCNC: 91 MMOL/L (ref 98–107)
CO2: 40 MMOL/L (ref 22–29)
CREAT SERPL-MCNC: 1.6 MG/DL (ref 0.7–1.2)
GFR AFRICAN AMERICAN: 51
GFR NON-AFRICAN AMERICAN: 43 ML/MIN/1.73
GLUCOSE BLD-MCNC: 91 MG/DL (ref 74–99)
HCT VFR BLD CALC: 52.8 % (ref 37–54)
HEMOGLOBIN: 15.5 G/DL (ref 12.5–16.5)
POTASSIUM REFLEX MAGNESIUM: 4.7 MMOL/L (ref 3.5–5)
SODIUM BLD-SCNC: 145 MMOL/L (ref 132–146)

## 2020-06-27 PROCEDURE — 6370000000 HC RX 637 (ALT 250 FOR IP): Performed by: HOSPITALIST

## 2020-06-27 PROCEDURE — 94660 CPAP INITIATION&MGMT: CPT

## 2020-06-27 PROCEDURE — 6360000002 HC RX W HCPCS: Performed by: INTERNAL MEDICINE

## 2020-06-27 PROCEDURE — 6370000000 HC RX 637 (ALT 250 FOR IP): Performed by: INTERNAL MEDICINE

## 2020-06-27 PROCEDURE — 2580000003 HC RX 258: Performed by: INTERNAL MEDICINE

## 2020-06-27 PROCEDURE — 80048 BASIC METABOLIC PNL TOTAL CA: CPT

## 2020-06-27 PROCEDURE — 85018 HEMOGLOBIN: CPT

## 2020-06-27 PROCEDURE — 36415 COLL VENOUS BLD VENIPUNCTURE: CPT

## 2020-06-27 PROCEDURE — 2700000000 HC OXYGEN THERAPY PER DAY

## 2020-06-27 PROCEDURE — 85014 HEMATOCRIT: CPT

## 2020-06-27 PROCEDURE — 94640 AIRWAY INHALATION TREATMENT: CPT

## 2020-06-27 PROCEDURE — 6370000000 HC RX 637 (ALT 250 FOR IP): Performed by: CLINICAL NURSE SPECIALIST

## 2020-06-27 RX ORDER — LACTULOSE 10 G/15ML
20 SOLUTION ORAL 3 TIMES DAILY
Qty: 1 BOTTLE | Refills: 1 | Status: SHIPPED | OUTPATIENT
Start: 2020-06-27 | End: 2022-05-02

## 2020-06-27 RX ORDER — BUDESONIDE AND FORMOTEROL FUMARATE DIHYDRATE 160; 4.5 UG/1; UG/1
2 AEROSOL RESPIRATORY (INHALATION) 2 TIMES DAILY
Qty: 1 INHALER | Refills: 0 | Status: SHIPPED | OUTPATIENT
Start: 2020-06-27 | End: 2020-06-27 | Stop reason: SDUPTHER

## 2020-06-27 RX ORDER — BUDESONIDE AND FORMOTEROL FUMARATE DIHYDRATE 160; 4.5 UG/1; UG/1
2 AEROSOL RESPIRATORY (INHALATION) 2 TIMES DAILY
Qty: 1 INHALER | Refills: 0 | Status: SHIPPED | OUTPATIENT
Start: 2020-06-27 | End: 2022-05-02

## 2020-06-27 RX ORDER — ACETAZOLAMIDE 250 MG/1
250 TABLET ORAL DAILY
Qty: 90 TABLET | Refills: 3 | Status: SHIPPED | OUTPATIENT
Start: 2020-06-28 | End: 2020-06-27

## 2020-06-27 RX ORDER — SPIRONOLACTONE 25 MG/1
25 TABLET ORAL DAILY
Qty: 30 TABLET | Refills: 3 | Status: SHIPPED | OUTPATIENT
Start: 2020-06-28 | End: 2020-06-27

## 2020-06-27 RX ORDER — FLUTICASONE PROPIONATE 50 MCG
1 SPRAY, SUSPENSION (ML) NASAL DAILY
Qty: 1 BOTTLE | Refills: 3 | Status: SHIPPED | OUTPATIENT
Start: 2020-06-28 | End: 2020-06-27

## 2020-06-27 RX ORDER — POLYETHYLENE GLYCOL 3350 17 G/17G
17 POWDER, FOR SOLUTION ORAL DAILY
Qty: 510 G | Refills: 0 | Status: SHIPPED | OUTPATIENT
Start: 2020-06-27 | End: 2020-07-27

## 2020-06-27 RX ORDER — LACTULOSE 10 G/15ML
20 SOLUTION ORAL 3 TIMES DAILY
Qty: 1 BOTTLE | Refills: 1 | Status: SHIPPED | OUTPATIENT
Start: 2020-06-27 | End: 2020-06-27

## 2020-06-27 RX ORDER — ACETAZOLAMIDE 250 MG/1
250 TABLET ORAL DAILY
Qty: 90 TABLET | Refills: 3 | Status: SHIPPED | OUTPATIENT
Start: 2020-06-28 | End: 2022-05-02

## 2020-06-27 RX ORDER — HYDROCORTISONE 25 MG/G
CREAM TOPICAL
Qty: 1 TUBE | Refills: 2 | Status: SHIPPED | OUTPATIENT
Start: 2020-06-27 | End: 2022-05-02

## 2020-06-27 RX ORDER — FLUTICASONE PROPIONATE 50 MCG
1 SPRAY, SUSPENSION (ML) NASAL DAILY
Qty: 1 BOTTLE | Refills: 3 | Status: SHIPPED | OUTPATIENT
Start: 2020-06-28 | End: 2022-05-02

## 2020-06-27 RX ORDER — SPIRONOLACTONE 25 MG/1
25 TABLET ORAL DAILY
Qty: 30 TABLET | Refills: 3 | Status: SHIPPED | OUTPATIENT
Start: 2020-06-28 | End: 2022-05-02

## 2020-06-27 RX ORDER — PREDNISONE 20 MG/1
40 TABLET ORAL DAILY
Qty: 10 TABLET | Refills: 0 | Status: SHIPPED | OUTPATIENT
Start: 2020-06-28 | End: 2020-07-03

## 2020-06-27 RX ORDER — PREDNISONE 20 MG/1
40 TABLET ORAL DAILY
Qty: 10 TABLET | Refills: 0 | Status: SHIPPED | OUTPATIENT
Start: 2020-06-28 | End: 2020-06-27

## 2020-06-27 RX ADMIN — METOPROLOL TARTRATE 25 MG: 25 TABLET, FILM COATED ORAL at 08:50

## 2020-06-27 RX ADMIN — DOCUSATE SODIUM 100 MG: 100 CAPSULE, LIQUID FILLED ORAL at 08:50

## 2020-06-27 RX ADMIN — IPRATROPIUM BROMIDE AND ALBUTEROL SULFATE 1 AMPULE: 2.5; .5 SOLUTION RESPIRATORY (INHALATION) at 11:40

## 2020-06-27 RX ADMIN — SPIRONOLACTONE 25 MG: 25 TABLET ORAL at 08:49

## 2020-06-27 RX ADMIN — LACTULOSE 20 G: 20 SOLUTION ORAL at 08:50

## 2020-06-27 RX ADMIN — FUROSEMIDE 40 MG: 10 INJECTION INTRAMUSCULAR; INTRAVENOUS at 08:50

## 2020-06-27 RX ADMIN — DOXAZOSIN 2 MG: 2 TABLET ORAL at 08:49

## 2020-06-27 RX ADMIN — Medication 10 ML: at 08:51

## 2020-06-27 RX ADMIN — POLYETHYLENE GLYCOL 3350 17 G: 17 POWDER, FOR SOLUTION ORAL at 08:48

## 2020-06-27 RX ADMIN — LEVOTHYROXINE SODIUM 100 MCG: 0.1 TABLET ORAL at 06:01

## 2020-06-27 RX ADMIN — IPRATROPIUM BROMIDE AND ALBUTEROL SULFATE 1 AMPULE: 2.5; .5 SOLUTION RESPIRATORY (INHALATION) at 08:30

## 2020-06-27 RX ADMIN — HYDROCORTISONE: 25 CREAM TOPICAL at 08:57

## 2020-06-27 RX ADMIN — ARFORMOTEROL TARTRATE 15 MCG: 15 SOLUTION RESPIRATORY (INHALATION) at 08:30

## 2020-06-27 RX ADMIN — PREDNISONE 40 MG: 20 TABLET ORAL at 08:49

## 2020-06-27 RX ADMIN — FLUTICASONE PROPIONATE 1 SPRAY: 50 SPRAY, METERED NASAL at 08:56

## 2020-06-27 RX ADMIN — NYSTATIN: 100000 CREAM TOPICAL at 08:57

## 2020-06-27 RX ADMIN — PANTOPRAZOLE SODIUM 40 MG: 40 TABLET, DELAYED RELEASE ORAL at 06:01

## 2020-06-27 RX ADMIN — BUDESONIDE 1000 MCG: 0.5 SUSPENSION RESPIRATORY (INHALATION) at 08:30

## 2020-06-27 RX ADMIN — ACETAZOLAMIDE 250 MG: 250 TABLET ORAL at 08:49

## 2020-06-27 ASSESSMENT — PAIN SCALES - GENERAL
PAINLEVEL_OUTOF10: 9
PAINLEVEL_OUTOF10: 0

## 2020-06-27 NOTE — PLAN OF CARE
Problem: Falls - Risk of:  Goal: Will remain free from falls  Description: Will remain free from falls  Outcome: Completed  Goal: Absence of physical injury  Description: Absence of physical injury  Outcome: Completed     Problem: Breathing Pattern - Ineffective:  Goal: Ability to achieve and maintain a regular respiratory rate will improve  Description: Ability to achieve and maintain a regular respiratory rate will improve  Outcome: Completed     Problem: Restraint Use - Nonviolent/Non-Self-Destructive Behavior:  Goal: Absence of restraint indications  Description: Absence of restraint indications  Outcome: Completed  Goal: Absence of restraint-related injury  Description: Absence of restraint-related injury  Outcome: Completed

## 2020-06-27 NOTE — PROGRESS NOTES
Pt refusing bipap at this time. Stated too much pressure and the masks are not comfortable. Tried full face, nasal mask, and nasal prongs with no improvement. bipap on standby in pt room.

## 2020-06-27 NOTE — DISCHARGE SUMMARY
Hospital Medicine Discharge Summary    Patient ID: Hank Harrington      Patient's PCP: Angelina Pina DO    Admit Date: 6/19/2020     Discharge Date:   06/27/2020    Admitting Physician: Coy Mooney DO     Discharge Physician: Hanh Coronado MD     Discharge Diagnoses: Active Hospital Problems    Diagnosis Date Noted    Acute on chronic respiratory failure ([facility] [address] [facility] [J96.20] 06/19/2020       The patient was seen and examined on day of discharge and this discharge summary is in conjunction with any daily progress note from day of discharge. Hospital Course:     Patient admitted on 6/19/2020, 79-year-old male with past medical history of COPD, diastolic congestive heart failure and cardiomyopathy presented on June 19, 2020 with shortness of breath.  Began 2 days prior to admission.  Chronically oxygen dependent on 4 L.  Admitted for acute respiratory failure with hypercapnia. Placed on BiPAP and ICU admission initially. Patient improving from respiratory stand point, back on 3L NC with adequate O2 sats. Refusing to wear BIPAP at night. He has been having some complaints of hemorrhoidal pain and bleeding, General Surgery evaluated the patient and recommended topical creams along with bowel regimen. I have also recommended sitz bath, ordered. Exam:     /78   Pulse 88   Temp 96.7 °F (35.9 °C) (Temporal)   Resp 20   Ht 6' (1.829 m)   Wt 242 lb (109.8 kg)   SpO2 100%   BMI 32.82 kg/m²     General appearance: No apparent distress, appears stated age and cooperative. HEENT: Pupils equal, round, and reactive to light. Conjunctivae/corneas clear. Neck: Supple, with full range of motion. No jugular venous distention. Trachea midline. Respiratory:  Normal respiratory effort. Clear to auscultation, bilaterally without Rales/Wheezes/Rhonchi. Cardiovascular: Regular rate and rhythm with normal S1/S2 without murmurs, rubs or gallops.   Abdomen: Soft, non-tender, non-distended with normal (SYMBICORT) 160-4.5 MCG/ACT AERO Inhale 2 puffs into the lungs 2 times daily  Qty: 1 Inhaler, Refills: 0      ipratropium (ATROVENT HFA) 17 MCG/ACT inhaler Inhale 1 puff into the lungs 3 times daily  Qty: 1 Inhaler, Refills: 3      hydrocortisone (ANUSOL-HC) 2.5 % CREA rectal cream Apply to anus as needed for itching and after each bowel movement  Qty: 1 Tube, Refills: 2      polyethylene glycol (GLYCOLAX) 17 GM/SCOOP powder Take 17 g by mouth daily  Qty: 510 g, Refills: 0              Details   spironolactone (ALDACTONE) 25 MG tablet Take 1 tablet by mouth daily  Qty: 30 tablet, Refills: 3              Details   vitamin D 25 MCG (1000 UT) CAPS Take 2,000 Units by mouth      bumetanide (BUMEX) 2 MG tablet Take 1 tablet by mouth daily  Qty: 30 tablet, Refills: 1      pantoprazole (PROTONIX) 40 MG tablet Take 1 tablet by mouth 2 times daily (before meals)  Qty: 30 tablet, Refills: 3      sucralfate (CARAFATE) 1 GM tablet Take 1 tablet by mouth three times daily  Qty: 120 tablet, Refills: 0      folic acid (FOLVITE) 1 MG tablet Take 1 tablet by mouth daily  Qty: 30 tablet, Refills: 3      acetaminophen (TYLENOL) 500 MG tablet Take 1 tablet by mouth every 6 hours as needed  Qty: 120 tablet, Refills: 3      terazosin (HYTRIN) 2 MG capsule Take 1 capsule by mouth nightly Take 2 tablets at bedtime  Qty: 30 capsule, Refills: 3      miconazole nitrate 2 % OINT Apply topically 2 times daily  Refills: 0      docusate sodium (COLACE, DULCOLAX) 100 MG CAPS Take 100 mg by mouth 2 times daily      levothyroxine (SYNTHROID) 100 MCG tablet Take 1 tablet by mouth Daily  Qty: 30 tablet, Refills: 3             Time Spent on discharge is more than 45 minutes in the examination, evaluation, counseling and review of medications and discharge plan. Signed:    Chang Gu MD   6/27/2020      Thank you Judson Bar DO for the opportunity to be involved in this patient's care.  If you have any questions or concerns please feel free to contact me at [phone].

## 2020-06-29 ENCOUNTER — CARE COORDINATION (OUTPATIENT)
Dept: CASE MANAGEMENT | Age: 75
End: 2020-06-29

## 2020-06-30 ENCOUNTER — CARE COORDINATION (OUTPATIENT)
Dept: CASE MANAGEMENT | Age: 75
End: 2020-06-30

## 2020-06-30 RX ORDER — TORSEMIDE 20 MG/1
40 TABLET ORAL 2 TIMES DAILY
Status: ON HOLD | COMMUNITY
End: 2022-05-11 | Stop reason: HOSPADM

## 2020-07-06 ENCOUNTER — CARE COORDINATION (OUTPATIENT)
Dept: CASE MANAGEMENT | Age: 75
End: 2020-07-06

## 2020-07-06 NOTE — CARE COORDINATION
[facility] Transitions Follow Up Call    2020    Patient: Martin   Patient : 1945   MRN: [id number]  Reason for Admission: Acute on chronic respiratory failure with hypoxia and hypercapnia  Discharge Date: 20 RARS: Readmission Risk Score: 23         Spoke with: No one    Attempt to reach the patient for Covid-19 Monitoring Care Transition call post hospital discharge. Message left with CTN's contact information requesting return phone call. No HIPAA on file. No further outreach scheduled at this time due to inability to contact patient. Episode to auto resolve on 20. Follow Up  No future appointments.     Alvarado Romero RN

## 2021-08-13 ENCOUNTER — HOSPITAL ENCOUNTER (EMERGENCY)
Age: 76
Discharge: HOME | End: 2021-08-13
Attending: STUDENT IN AN ORGANIZED HEALTH CARE EDUCATION/TRAINING PROGRAM
Payer: MEDICARE

## 2021-08-13 ENCOUNTER — APPOINTMENT (OUTPATIENT)
Dept: GENERAL RADIOLOGY | Age: 76
End: 2021-08-13
Payer: MEDICARE

## 2021-08-13 VITALS
HEIGHT: 72 IN | BODY MASS INDEX: 36.57 KG/M2 | OXYGEN SATURATION: 92 % | RESPIRATION RATE: 18 BRPM | SYSTOLIC BLOOD PRESSURE: 134 MMHG | DIASTOLIC BLOOD PRESSURE: 88 MMHG | TEMPERATURE: 98.5 F | WEIGHT: 270 LBS | HEART RATE: 88 BPM

## 2021-08-13 DIAGNOSIS — I50.9 CHRONIC CONGESTIVE HEART FAILURE, UNSPECIFIED HEART FAILURE TYPE (HCC): ICD-10-CM

## 2021-08-13 DIAGNOSIS — M86.662 CHRONIC OSTEOMYELITIS INVOLVING LOWER LEG, LEFT (HCC): Primary | ICD-10-CM

## 2021-08-13 DIAGNOSIS — L02.419 CELLULITIS AND ABSCESS OF LEG: ICD-10-CM

## 2021-08-13 DIAGNOSIS — L03.119 CELLULITIS AND ABSCESS OF LEG: ICD-10-CM

## 2021-08-13 LAB
ALBUMIN SERPL-MCNC: 3.4 G/DL (ref 3.5–5.2)
ALP BLD-CCNC: 88 U/L (ref 40–129)
ALT SERPL-CCNC: 16 U/L (ref 0–40)
ANION GAP SERPL CALCULATED.3IONS-SCNC: 8 MMOL/L (ref 7–16)
AST SERPL-CCNC: 23 U/L (ref 0–39)
BASOPHILS ABSOLUTE: 0.02 E9/L (ref 0–0.2)
BASOPHILS RELATIVE PERCENT: 0.3 % (ref 0–2)
BILIRUB SERPL-MCNC: 0.7 MG/DL (ref 0–1.2)
BUN BLDV-MCNC: 29 MG/DL (ref 6–23)
CALCIUM SERPL-MCNC: 9.4 MG/DL (ref 8.6–10.2)
CHLORIDE BLD-SCNC: 105 MMOL/L (ref 98–107)
CO2: 27 MMOL/L (ref 22–29)
CREAT SERPL-MCNC: 1.6 MG/DL (ref 0.7–1.2)
EOSINOPHILS ABSOLUTE: 0.07 E9/L (ref 0.05–0.5)
EOSINOPHILS RELATIVE PERCENT: 1.1 % (ref 0–6)
GFR AFRICAN AMERICAN: 51
GFR NON-AFRICAN AMERICAN: 42 ML/MIN/1.73
GLUCOSE BLD-MCNC: 111 MG/DL (ref 74–99)
HCT VFR BLD CALC: 45.6 % (ref 37–54)
HEMOGLOBIN: 14.3 G/DL (ref 12.5–16.5)
IMMATURE GRANULOCYTES #: 0.02 E9/L
IMMATURE GRANULOCYTES %: 0.3 % (ref 0–5)
LYMPHOCYTES ABSOLUTE: 0.87 E9/L (ref 1.5–4)
LYMPHOCYTES RELATIVE PERCENT: 13.9 % (ref 20–42)
MCH RBC QN AUTO: 29.2 PG (ref 26–35)
MCHC RBC AUTO-ENTMCNC: 31.4 % (ref 32–34.5)
MCV RBC AUTO: 93.3 FL (ref 80–99.9)
MONOCYTES ABSOLUTE: 0.51 E9/L (ref 0.1–0.95)
MONOCYTES RELATIVE PERCENT: 8.1 % (ref 2–12)
NEUTROPHILS ABSOLUTE: 4.79 E9/L (ref 1.8–7.3)
NEUTROPHILS RELATIVE PERCENT: 76.3 % (ref 43–80)
PDW BLD-RTO: 16.6 FL (ref 11.5–15)
PLATELET # BLD: 166 E9/L (ref 130–450)
PMV BLD AUTO: 11 FL (ref 7–12)
POTASSIUM REFLEX MAGNESIUM: 4.8 MMOL/L (ref 3.5–5)
PRO-BNP: 1355 PG/ML (ref 0–450)
RBC # BLD: 4.89 E12/L (ref 3.8–5.8)
SODIUM BLD-SCNC: 140 MMOL/L (ref 132–146)
TOTAL PROTEIN: 6.6 G/DL (ref 6.4–8.3)
WBC # BLD: 6.3 E9/L (ref 4.5–11.5)

## 2021-08-13 PROCEDURE — 85025 COMPLETE CBC W/AUTO DIFF WBC: CPT

## 2021-08-13 PROCEDURE — 71045 X-RAY EXAM CHEST 1 VIEW: CPT

## 2021-08-13 PROCEDURE — 80053 COMPREHEN METABOLIC PANEL: CPT

## 2021-08-13 PROCEDURE — 99283 EMERGENCY DEPT VISIT LOW MDM: CPT

## 2021-08-13 PROCEDURE — 36415 COLL VENOUS BLD VENIPUNCTURE: CPT

## 2021-08-13 PROCEDURE — 73590 X-RAY EXAM OF LOWER LEG: CPT

## 2021-08-13 PROCEDURE — 83880 ASSAY OF NATRIURETIC PEPTIDE: CPT

## 2021-08-13 RX ORDER — CEFDINIR 300 MG/1
300 CAPSULE ORAL 2 TIMES DAILY
Qty: 20 CAPSULE | Refills: 0 | Status: SHIPPED | OUTPATIENT
Start: 2021-08-13 | End: 2021-08-23

## 2021-08-13 ASSESSMENT — PAIN DESCRIPTION - LOCATION: LOCATION: LEG

## 2021-08-13 ASSESSMENT — PAIN SCALES - GENERAL: PAINLEVEL_OUTOF10: 5

## 2021-08-13 ASSESSMENT — PAIN DESCRIPTION - ORIENTATION: ORIENTATION: LEFT;LOWER

## 2021-08-13 ASSESSMENT — PAIN DESCRIPTION - FREQUENCY: FREQUENCY: CONTINUOUS

## 2021-08-13 ASSESSMENT — PAIN DESCRIPTION - DESCRIPTORS: DESCRIPTORS: THROBBING;ACHING

## 2021-08-13 NOTE — ED PROVIDER NOTES
Independent:      HPI:  8/13/21, Time: 11:36 AM EDT         Flavio Cotton is a 78 y.o. male presenting to the ED for wound to left leg, beginning 2 weeks  ago. The complaint has been persistent, moderate in severity, and worsened by nothing. The patient reports he noticed 2 small blisters to his left shin about 2 weeks ago which opened and is unsure as to where the blisters came from. Might be a small amount of poison ivy. He states that he has been using some hydrogen peroxide as well as putting some bacitracin to the area. He states that the area has been getting larger and noticed some redness to his shin. He reports he has had no systemic fever. He complains of no significant pain. He does take Xarelto due to a history of paroxysmal A. fib. He also has a history of chronic congestive heart failure as well as COPD. He does follow with the [place] locally as well as Dr. Yana Prado on an occasional basis. He states he does use home oxygen therapy as needed. He has not missed any doses of his Xarelto and denies any recent travel for any long distance as well as denies any fall, trauma or injury. He does have some screws in his left ankle which have been there for quite some time. Review of Systems:   A complete review of systems was performed and pertinent positives and negatives are stated within HPI, all other systems reviewed and are negative.        --------------------------------------------- PAST HISTORY ---------------------------------------------  Past Medical History:  has a past medical history of Abdominal aortic aneurysm (AAA) >39 mm diameter ([address] Anemia, Cardiomyopathy ([address] Carpal tunnel syndrome, CHF (congestive heart failure) ([address] COPD (chronic obstructive pulmonary disease) ([facility] GERD (gastroesophageal reflux disease), Hx of colonic polyps, Hypertension, Hypothyroidism, Rectal bleeding, Respiratory neoplasm, Sleep apnea, and Tobacco use.     Past Surgical History:  has a past surgical history that includes Abdomen surgery; Colonoscopy; Salivary gland surgery; Cholecystectomy; Endoscopy, colon, diagnostic; Dilatation, esophagus; fracture surgery; Cardiac catheterization; joint replacement; vascular surgery; Upper gastrointestinal endoscopy (N/A, 12/9/2018); and Colonoscopy (N/A, 12/9/2018). Social History:  reports that he quit smoking about 21 years ago. He has never used smokeless tobacco. He reports current alcohol use of about 21.0 standard drinks of alcohol per week. Family History: family history is not on file. The patients home medications have been reviewed.     Allergies: Amlodipine, Isosorbide mononitrate [isosorbide nitrate], and Lisinopril    -------------------------------------------------- RESULTS -------------------------------------------------  All laboratory and radiology results have been personally reviewed by myself   LABS:  Results for orders placed or performed during the hospital encounter of 08/13/21   CBC Auto Differential   Result Value Ref Range    WBC 6.3 4.5 - 11.5 E9/L    RBC 4.89 3.80 - 5.80 E12/L    Hemoglobin 14.3 12.5 - 16.5 g/dL    Hematocrit 45.6 37.0 - 54.0 %    MCV 93.3 80.0 - 99.9 fL    MCH 29.2 26.0 - 35.0 pg    MCHC 31.4 (L) 32.0 - 34.5 %    RDW 16.6 (H) 11.5 - 15.0 fL    Platelets 439 338 - 380 E9/L    MPV 11.0 7.0 - 12.0 fL    Neutrophils % 76.3 43.0 - 80.0 %    Immature Granulocytes % 0.3 0.0 - 5.0 %    Lymphocytes % 13.9 (L) 20.0 - 42.0 %    Monocytes % 8.1 2.0 - 12.0 %    Eosinophils % 1.1 0.0 - 6.0 %    Basophils % 0.3 0.0 - 2.0 %    Neutrophils Absolute 4.79 1.80 - 7.30 E9/L    Immature Granulocytes # 0.02 E9/L    Lymphocytes Absolute 0.87 (L) 1.50 - 4.00 E9/L    Monocytes Absolute 0.51 0.10 - 0.95 E9/L    Eosinophils Absolute 0.07 0.05 - 0.50 E9/L    Basophils Absolute 0.02 0.00 - 0.20 E9/L   Comprehensive Metabolic Panel w/ Reflex to MG   Result Value Ref Range    Sodium 140 132 - 146 mmol/L    Potassium reflex Magnesium 4.8 3.5 - 5.0 mmol/L    Chloride 105 98 - 107 mmol/L    CO2 27 22 - 29 mmol/L    Anion Gap 8 7 - 16 mmol/L    Glucose 111 (H) 74 - 99 mg/dL    BUN 29 (H) 6 - 23 mg/dL    CREATININE 1.6 (H) 0.7 - 1.2 mg/dL    GFR Non-African American 42 >=60 mL/min/1.73    GFR African American 51     Calcium 9.4 8.6 - 10.2 mg/dL    Total Protein 6.6 6.4 - 8.3 g/dL    Albumin 3.4 (L) 3.5 - 5.2 g/dL    Total Bilirubin 0.7 0.0 - 1.2 mg/dL    Alkaline Phosphatase 88 40 - 129 U/L    ALT 16 0 - 40 U/L    AST 23 0 - 39 U/L   Brain Natriuretic Peptide   Result Value Ref Range    Pro-BNP 1,355 (H) 0 - 450 pg/mL       RADIOLOGY:  Interpreted by Radiologist.  XR CHEST PORTABLE   Final Result   Cardiomegaly. There are findings of mild CHF. XR TIBIA FIBULA LEFT (2 VIEWS)   Final Result   1. Anterior cortical thickening and periosteal new bone mid tibia, concerning   for chronic osteomyelitis. 2. Diffuse edema/cellulitis.             ------------------------- NURSING NOTES AND VITALS REVIEWED ---------------------------   The nursing notes within the ED encounter and vital signs as below have been reviewed. /88   Pulse 88   Temp 98.5 °F (36.9 °C) (Temporal)   Resp 18   Ht 6' (1.829 m)   Wt 270 lb (122.5 kg)   SpO2 92%   BMI 36.62 kg/m²   Oxygen Saturation Interpretation: Normal      ---------------------------------------------------PHYSICAL EXAM--------------------------------------      Constitutional/General: Alert and oriented x3, well appearing, non toxic in NAD  Head: Normocephalic and atraumatic  Eyes: PERRL, EOMI  Mouth: Oropharynx clear, handling secretions, no trismus  Neck: Supple, full ROM  Pulmonary: Lungs appear essentially clear to auscultation bilaterally, no wheezes, rales, or rhonchi. Not in respiratory distress  Cardiovascular:  Regular rate and rhythm with occasional ectopy, 2+ distal pulses with sensory intact  Abdomen: Soft, non tender, obese, non tender  Extremities: Moves all extremities x 4.   Chronic stasis changes to bilateral lower extremities. Local wound noted to left anterior shin(refer to photo on chart), Wound draining some clear fluid, no odor or pus noted. Local erythema skin surrounding wound area, some high-pressure varicosities noted to lower leg. Erythema extends only to wound surface around the anterior shin area and does not surround to posterior calf with no other open wounds noted. No lymphangitis coming from wound proximally towards the thigh, please note picture in chart. Warm and well perfused  Skin: warm and dry  Neurologic: GCS 15  Psych: Normal Affect      ------------------------------ ED COURSE/MEDICAL DECISION MAKING----------------------  Medications - No data to display          ED COURSE:       Medical Decision Making:    Patient to ER, complaints of open wound to left lower leg, onset about 2 weeks ago, began with 2 small blisters and wound has progressed. Patient advised will check xrays of left tib fib as well as a few labs to make sure no evidence of osteomyelitis. Patient has chronic paroxysmal afib as well as chronic venous stasis changes. Also has a history of CHF on 2 medications for diuresis and also takes chronic inhalers for COPD. He states he does experience some shortness of breath with exertion which is not new for him. He does follow regularly with the [facility]. Patient reports his current breathing is at his baseline for him. His current BNP is noted, good for patient. CXR noted as well. He has missed no doses of his diuretics or cardiac meds. I did review his xrays of his tib/fib with him at the bedside and showed him the area of concern with his tibia. He is unsure whether or not he has had x-rays done at the local [facility]. Stressed with him the importance to follow-up concerning the evidence of possible chronic osteomyelitis. Discussed with him that he may need to see infectious disease/orthopedic/PCP/wound care.   He will be placed on oral Omnicef as well as local wound care advised. He is to return to ER immediately if any problems sooner or if a delay in follow up with his PCP. Counseling: The emergency provider has spoken with the patient and discussed todays results, in addition to providing specific details for the plan of care and counseling regarding the diagnosis and prognosis. Questions are answered at this time and they are agreeable with the plan.      --------------------------------- IMPRESSION AND DISPOSITION ---------------------------------    IMPRESSION  1. Chronic osteomyelitis involving lower leg, left ([facility]    2. Cellulitis of left leg    3. Chronic congestive heart failure, unspecified heart failure type ([facility]        DISPOSITION  Disposition: Discharge to home  Patient condition is stable      NOTE: This report was transcribed using voice recognition software.  Every effort was made to ensure accuracy; however, inadvertent computerized transcription errors may be present       Kenny Powell PA-C  08/14/21 0009

## 2021-08-13 NOTE — ED PROVIDER NOTES
ATTENDING PROVIDER ATTESTATION:     Supervising Physician, on-site, available for consultation, non-participatory in the evaluation or care of this patient     Bethany Rico MD  08/13/21 2030

## 2022-01-01 ENCOUNTER — APPOINTMENT (OUTPATIENT)
Dept: GENERAL RADIOLOGY | Age: 77
End: 2022-01-01
Payer: MEDICARE

## 2022-01-01 ENCOUNTER — APPOINTMENT (OUTPATIENT)
Dept: CT IMAGING | Age: 77
End: 2022-01-01
Payer: MEDICARE

## 2022-01-01 ENCOUNTER — HOSPITAL ENCOUNTER (INPATIENT)
Age: 77
LOS: 1 days | End: 2022-12-02
Attending: STUDENT IN AN ORGANIZED HEALTH CARE EDUCATION/TRAINING PROGRAM | Admitting: INTERNAL MEDICINE
Payer: MEDICARE

## 2022-01-01 VITALS
HEIGHT: 72 IN | TEMPERATURE: 99.9 F | RESPIRATION RATE: 24 BRPM | BODY MASS INDEX: 37.5 KG/M2 | WEIGHT: 276.9 LBS | DIASTOLIC BLOOD PRESSURE: 96 MMHG | SYSTOLIC BLOOD PRESSURE: 144 MMHG | OXYGEN SATURATION: 68 % | HEART RATE: 38 BPM

## 2022-01-01 DIAGNOSIS — E03.9 HYPOTHYROIDISM, UNSPECIFIED TYPE: ICD-10-CM

## 2022-01-01 DIAGNOSIS — J96.02 ACUTE RESPIRATORY FAILURE WITH HYPERCAPNIA (HCC): ICD-10-CM

## 2022-01-01 DIAGNOSIS — R41.82 ALTERED MENTAL STATUS, UNSPECIFIED ALTERED MENTAL STATUS TYPE: Primary | ICD-10-CM

## 2022-01-01 DIAGNOSIS — E87.5 HYPERKALEMIA: ICD-10-CM

## 2022-01-01 DIAGNOSIS — J90 BILATERAL PLEURAL EFFUSION: ICD-10-CM

## 2022-01-01 DIAGNOSIS — T68.XXXA HYPOTHERMIA, INITIAL ENCOUNTER: ICD-10-CM

## 2022-01-01 DIAGNOSIS — N17.9 ACUTE KIDNEY INJURY (HCC): ICD-10-CM

## 2022-01-01 LAB
ABO/RH: NORMAL
ADENOVIRUS BY PCR: NOT DETECTED
ALBUMIN SERPL-MCNC: 3.1 G/DL (ref 3.5–5.2)
ALBUMIN SERPL-MCNC: 3.3 G/DL (ref 3.5–5.2)
ALP BLD-CCNC: 106 U/L (ref 40–129)
ALP BLD-CCNC: 119 U/L (ref 40–129)
ALT SERPL-CCNC: 16 U/L (ref 0–40)
ALT SERPL-CCNC: 17 U/L (ref 0–40)
AMMONIA: 44.1 UMOL/L (ref 16–60)
AMMONIA: 60.4 UMOL/L (ref 16–60)
ANION GAP SERPL CALCULATED.3IONS-SCNC: 10 MMOL/L (ref 7–16)
ANION GAP SERPL CALCULATED.3IONS-SCNC: 6 MMOL/L (ref 7–16)
ANION GAP SERPL CALCULATED.3IONS-SCNC: 7 MMOL/L (ref 7–16)
ANION GAP SERPL CALCULATED.3IONS-SCNC: 7 MMOL/L (ref 7–16)
ANION GAP SERPL CALCULATED.3IONS-SCNC: 8 MMOL/L (ref 7–16)
ANISOCYTOSIS: ABNORMAL
ANTIBODY SCREEN: NORMAL
AST SERPL-CCNC: 20 U/L (ref 0–39)
AST SERPL-CCNC: 21 U/L (ref 0–39)
B.E.: 0.3 MMOL/L (ref -3–3)
B.E.: 1.7 MMOL/L (ref -3–3)
B.E.: 4.2 MMOL/L (ref -3–3)
B.E.: 6.7 MMOL/L (ref -3–3)
B.E.: 8.5 MMOL/L (ref -3–3)
BACTERIA: ABNORMAL /HPF
BASOPHILS ABSOLUTE: 0 E9/L (ref 0–0.2)
BASOPHILS ABSOLUTE: 0 E9/L (ref 0–0.2)
BASOPHILS RELATIVE PERCENT: 0 % (ref 0–2)
BASOPHILS RELATIVE PERCENT: 0 % (ref 0–2)
BILIRUB SERPL-MCNC: 0.6 MG/DL (ref 0–1.2)
BILIRUB SERPL-MCNC: 0.7 MG/DL (ref 0–1.2)
BILIRUBIN URINE: ABNORMAL
BLOOD CULTURE, ROUTINE: NORMAL
BLOOD, URINE: NEGATIVE
BORDETELLA PARAPERTUSSIS BY PCR: NOT DETECTED
BORDETELLA PERTUSSIS BY PCR: NOT DETECTED
BUN BLDV-MCNC: 52 MG/DL (ref 6–23)
BUN BLDV-MCNC: 54 MG/DL (ref 6–23)
BUN BLDV-MCNC: 55 MG/DL (ref 6–23)
BUN BLDV-MCNC: 56 MG/DL (ref 6–23)
BUN BLDV-MCNC: 57 MG/DL (ref 6–23)
C-REACTIVE PROTEIN: 8.7 MG/DL (ref 0–0.4)
CALCIUM SERPL-MCNC: 8.1 MG/DL (ref 8.6–10.2)
CALCIUM SERPL-MCNC: 8.2 MG/DL (ref 8.6–10.2)
CALCIUM SERPL-MCNC: 8.6 MG/DL (ref 8.6–10.2)
CALCIUM SERPL-MCNC: 8.6 MG/DL (ref 8.6–10.2)
CALCIUM SERPL-MCNC: 8.9 MG/DL (ref 8.6–10.2)
CHLAMYDOPHILIA PNEUMONIAE BY PCR: NOT DETECTED
CHLORIDE BLD-SCNC: 89 MMOL/L (ref 98–107)
CHLORIDE BLD-SCNC: 90 MMOL/L (ref 98–107)
CHLORIDE BLD-SCNC: 94 MMOL/L (ref 98–107)
CHLORIDE BLD-SCNC: 94 MMOL/L (ref 98–107)
CHLORIDE BLD-SCNC: 95 MMOL/L (ref 98–107)
CHLORIDE URINE RANDOM: <20 MMOL/L
CLARITY: CLEAR
CO2: 29 MMOL/L (ref 22–29)
CO2: 29 MMOL/L (ref 22–29)
CO2: 30 MMOL/L (ref 22–29)
CO2: 33 MMOL/L (ref 22–29)
CO2: 34 MMOL/L (ref 22–29)
COHB: 1.7 % (ref 0–1.5)
COHB: 2 % (ref 0–1.5)
COHB: 2.3 % (ref 0–1.5)
COLOR: YELLOW
COMMENT: ABNORMAL
CORONAVIRUS 229E BY PCR: NOT DETECTED
CORONAVIRUS HKU1 BY PCR: NOT DETECTED
CORONAVIRUS NL63 BY PCR: NOT DETECTED
CORONAVIRUS OC43 BY PCR: NOT DETECTED
CORTISOL TOTAL: 19.88 MCG/DL (ref 2.68–18.4)
CREAT SERPL-MCNC: 3.3 MG/DL (ref 0.7–1.2)
CREAT SERPL-MCNC: 3.4 MG/DL (ref 0.7–1.2)
CREAT SERPL-MCNC: 3.5 MG/DL (ref 0.7–1.2)
CREAT SERPL-MCNC: 3.5 MG/DL (ref 0.7–1.2)
CREAT SERPL-MCNC: 3.6 MG/DL (ref 0.7–1.2)
CREATININE URINE: 261 MG/DL (ref 40–278)
CREATININE URINE: 261 MG/DL (ref 40–278)
CRITICAL: ABNORMAL
CULTURE, BLOOD 2: NORMAL
DATE ANALYZED: ABNORMAL
DATE OF COLLECTION: ABNORMAL
DIGOXIN LEVEL: <0.3 NG/ML (ref 0.8–2)
EKG ATRIAL RATE: 214 BPM
EKG ATRIAL RATE: 88 BPM
EKG Q-T INTERVAL: 386 MS
EKG Q-T INTERVAL: 488 MS
EKG QRS DURATION: 134 MS
EKG QRS DURATION: 96 MS
EKG QTC CALCULATION (BAZETT): 445 MS
EKG QTC CALCULATION (BAZETT): 544 MS
EKG R AXIS: 121 DEGREES
EKG R AXIS: 130 DEGREES
EKG T AXIS: -23 DEGREES
EKG T AXIS: -82 DEGREES
EKG VENTRICULAR RATE: 75 BPM
EKG VENTRICULAR RATE: 80 BPM
EOSINOPHILS ABSOLUTE: 0 E9/L (ref 0.05–0.5)
EOSINOPHILS ABSOLUTE: 0 E9/L (ref 0.05–0.5)
EOSINOPHILS RELATIVE PERCENT: 0 % (ref 0–6)
EOSINOPHILS RELATIVE PERCENT: 0 % (ref 0–6)
FIO2: 40 %
FIO2: 50 %
FIO2: 50 %
GFR SERPL CREATININE-BSD FRML MDRD: 17 ML/MIN/1.73
GFR SERPL CREATININE-BSD FRML MDRD: 18 ML/MIN/1.73
GFR SERPL CREATININE-BSD FRML MDRD: 19 ML/MIN/1.73
GLUCOSE BLD-MCNC: 100 MG/DL (ref 74–99)
GLUCOSE BLD-MCNC: 128 MG/DL (ref 74–99)
GLUCOSE BLD-MCNC: 32 MG/DL (ref 74–99)
GLUCOSE BLD-MCNC: 86 MG/DL (ref 74–99)
GLUCOSE BLD-MCNC: 86 MG/DL (ref 74–99)
GLUCOSE URINE: NEGATIVE MG/DL
HCO3: 31.5 MMOL/L (ref 22–26)
HCO3: 31.6 MMOL/L (ref 22–26)
HCO3: 32.9 MMOL/L (ref 22–26)
HCO3: 33.9 MMOL/L (ref 22–26)
HCO3: 41.5 MMOL/L (ref 22–26)
HCT VFR BLD CALC: 40.4 % (ref 37–54)
HCT VFR BLD CALC: 44 % (ref 37–54)
HEMOGLOBIN: 10.9 G/DL (ref 12.5–16.5)
HEMOGLOBIN: 11.4 G/DL (ref 12.5–16.5)
HHB: 1.7 % (ref 0–5)
HHB: 1.9 % (ref 0–5)
HHB: 10.3 % (ref 0–5)
HHB: 12.1 % (ref 0–5)
HHB: 6.4 % (ref 0–5)
HUMAN METAPNEUMOVIRUS BY PCR: NOT DETECTED
HUMAN RHINOVIRUS/ENTEROVIRUS BY PCR: NOT DETECTED
HYALINE CASTS: ABNORMAL /LPF (ref 0–2)
HYPOCHROMIA: ABNORMAL
INFLUENZA A BY PCR: NOT DETECTED
INFLUENZA A BY PCR: NOT DETECTED
INFLUENZA B BY PCR: NOT DETECTED
INFLUENZA B BY PCR: NOT DETECTED
INR BLD: 1.4
KETONES, URINE: NEGATIVE MG/DL
LAB: ABNORMAL
LACTATE DEHYDROGENASE: 220 U/L (ref 135–225)
LACTIC ACID, SEPSIS: 2.3 MMOL/L (ref 0.5–1.9)
LACTIC ACID, SEPSIS: 2.4 MMOL/L (ref 0.5–1.9)
LACTIC ACID: 2.1 MMOL/L (ref 0.5–2.2)
LACTIC ACID: 3.1 MMOL/L (ref 0.5–2.2)
LACTIC ACID: 3.7 MMOL/L (ref 0.5–2.2)
LACTIC ACID: 4.1 MMOL/L (ref 0.5–2.2)
LEUKOCYTE ESTERASE, URINE: ABNORMAL
LIPASE: 23 U/L (ref 13–60)
LYMPHOCYTES ABSOLUTE: 0 E9/L (ref 1.5–4)
LYMPHOCYTES ABSOLUTE: 0.21 E9/L (ref 1.5–4)
LYMPHOCYTES RELATIVE PERCENT: 0 % (ref 20–42)
LYMPHOCYTES RELATIVE PERCENT: 6 % (ref 20–42)
Lab: ABNORMAL
MAGNESIUM: 2.6 MG/DL (ref 1.6–2.6)
MAGNESIUM: 3 MG/DL (ref 1.6–2.6)
MCH RBC QN AUTO: 23.8 PG (ref 26–35)
MCH RBC QN AUTO: 24.3 PG (ref 26–35)
MCHC RBC AUTO-ENTMCNC: 25.9 % (ref 32–34.5)
MCHC RBC AUTO-ENTMCNC: 27 % (ref 32–34.5)
MCV RBC AUTO: 88.2 FL (ref 80–99.9)
MCV RBC AUTO: 93.8 FL (ref 80–99.9)
METER GLUCOSE: 110 MG/DL (ref 74–99)
METER GLUCOSE: 112 MG/DL (ref 74–99)
METER GLUCOSE: 115 MG/DL (ref 74–99)
METER GLUCOSE: 131 MG/DL (ref 74–99)
METER GLUCOSE: 136 MG/DL (ref 74–99)
METER GLUCOSE: 54 MG/DL (ref 74–99)
METER GLUCOSE: 57 MG/DL (ref 74–99)
METER GLUCOSE: 72 MG/DL (ref 74–99)
METER GLUCOSE: 73 MG/DL (ref 74–99)
METER GLUCOSE: 82 MG/DL (ref 74–99)
METER GLUCOSE: 86 MG/DL (ref 74–99)
METER GLUCOSE: 92 MG/DL (ref 74–99)
METER GLUCOSE: <40 MG/DL (ref 74–99)
METHB: 0.3 % (ref 0–1.5)
MODE: ABNORMAL
MODE: AC
MONOCYTES ABSOLUTE: 0.24 E9/L (ref 0.1–0.95)
MONOCYTES ABSOLUTE: 0.57 E9/L (ref 0.1–0.95)
MONOCYTES RELATIVE PERCENT: 3.5 % (ref 2–12)
MONOCYTES RELATIVE PERCENT: 7 % (ref 2–12)
MYCOPLASMA PNEUMONIAE BY PCR: NOT DETECTED
NEUTROPHILS ABSOLUTE: 13.87 E9/L (ref 1.8–7.3)
NEUTROPHILS ABSOLUTE: 3.05 E9/L (ref 1.8–7.3)
NEUTROPHILS RELATIVE PERCENT: 87 % (ref 43–80)
NEUTROPHILS RELATIVE PERCENT: 96.5 % (ref 43–80)
NITRITE, URINE: NEGATIVE
NUCLEATED RED BLOOD CELLS: 1.7 /100 WBC
NUCLEATED RED BLOOD CELLS: 3 /100 WBC
O2 CONTENT: 14.6 ML/DL
O2 CONTENT: 14.9 ML/DL
O2 CONTENT: 15.4 ML/DL
O2 CONTENT: 16.1 ML/DL
O2 CONTENT: 16.8 ML/DL
O2 SATURATION: 87.6 % (ref 92–98.5)
O2 SATURATION: 89.5 % (ref 92–98.5)
O2 SATURATION: 93.5 % (ref 92–98.5)
O2 SATURATION: 98 % (ref 92–98.5)
O2 SATURATION: 98.3 % (ref 92–98.5)
O2HB: 85.6 % (ref 94–97)
O2HB: 87.7 % (ref 94–97)
O2HB: 91.6 % (ref 94–97)
O2HB: 95.5 % (ref 94–97)
O2HB: 96.3 % (ref 94–97)
OPERATOR ID: 2485
OPERATOR ID: 3186
OPERATOR ID: 3186
OPERATOR ID: 7296
OPERATOR ID: ABNORMAL
OSMOLALITY URINE: 359 MOSM/KG (ref 300–900)
OVALOCYTES: ABNORMAL
PARAINFLUENZA VIRUS 1 BY PCR: NOT DETECTED
PARAINFLUENZA VIRUS 2 BY PCR: NOT DETECTED
PARAINFLUENZA VIRUS 3 BY PCR: NOT DETECTED
PARAINFLUENZA VIRUS 4 BY PCR: NOT DETECTED
PATIENT TEMP: 37 C
PCO2: 125.1 MMHG (ref 35–45)
PCO2: 54.9 MMHG (ref 35–45)
PCO2: 81.3 MMHG (ref 35–45)
PCO2: 82.1 MMHG (ref 35–45)
PCO2: 92.8 MMHG (ref 35–45)
PDW BLD-RTO: 28.6 FL (ref 11.5–15)
PDW BLD-RTO: 28.6 FL (ref 11.5–15)
PEEP/CPAP: 5 CMH2O
PEEP/CPAP: 6 CMH2O
PEEP/CPAP: 6 CMH2O
PFO2: 1.06 MMHG/%
PFO2: 1.85 MMHG/%
PFO2: 2.5 MMHG/%
PH BLOOD GAS: 7.14 (ref 7.35–7.45)
PH BLOOD GAS: 7.15 (ref 7.35–7.45)
PH BLOOD GAS: 7.21 (ref 7.35–7.45)
PH BLOOD GAS: 7.23 (ref 7.35–7.45)
PH BLOOD GAS: 7.4 (ref 7.35–7.45)
PH UA: 5.5 (ref 5–9)
PHOSPHORUS: 5.4 MG/DL (ref 2.5–4.5)
PHOSPHORUS: 6.8 MG/DL (ref 2.5–4.5)
PLATELET # BLD: 121 E9/L (ref 130–450)
PLATELET # BLD: 95 E9/L (ref 130–450)
PLATELET CONFIRMATION: NORMAL
PMV BLD AUTO: ABNORMAL FL (ref 7–12)
PMV BLD AUTO: ABNORMAL FL (ref 7–12)
PO2: 123.3 MMHG (ref 75–100)
PO2: 124.9 MMHG (ref 75–100)
PO2: 53.1 MMHG (ref 75–100)
PO2: 64 MMHG (ref 75–100)
PO2: 74 MMHG (ref 75–100)
POIKILOCYTES: ABNORMAL
POLYCHROMASIA: ABNORMAL
POLYCHROMASIA: ABNORMAL
POTASSIUM REFLEX MAGNESIUM: 6.2 MMOL/L (ref 3.5–5)
POTASSIUM SERPL-SCNC: 5.4 MMOL/L (ref 3.5–5)
POTASSIUM SERPL-SCNC: 5.8 MMOL/L (ref 3.5–5)
POTASSIUM SERPL-SCNC: 6.3 MMOL/L (ref 3.5–5)
POTASSIUM SERPL-SCNC: 6.5 MMOL/L (ref 3.5–5)
POTASSIUM SERPL-SCNC: 6.8 MMOL/L (ref 3.5–5)
POTASSIUM, UR: 45.2 MMOL/L
PRO-BNP: 2967 PG/ML (ref 0–450)
PROCALCITONIN: 0.61 NG/ML (ref 0–0.08)
PROTEIN PROTEIN: 35 MG/DL (ref 0–12)
PROTEIN UA: 30 MG/DL
PROTEIN/CREAT RATIO: 0.1
PROTEIN/CREAT RATIO: 0.1 (ref 0–0.2)
PROTHROMBIN TIME: 16.4 SEC (ref 9.3–12.4)
RBC # BLD: 4.58 E12/L (ref 3.8–5.8)
RBC # BLD: 4.69 E12/L (ref 3.8–5.8)
RBC UA: ABNORMAL /HPF (ref 0–2)
RESPIRATORY SYNCYTIAL VIRUS BY PCR: NOT DETECTED
RR MECHANICAL: 16 B/MIN
RR MECHANICAL: 16 B/MIN
RR MECHANICAL: 24 B/MIN
SARS-COV-2, NAAT: NOT DETECTED
SARS-COV-2, PCR: NOT DETECTED
SEDIMENTATION RATE, ERYTHROCYTE: 17 MM/HR (ref 0–15)
SODIUM BLD-SCNC: 126 MMOL/L (ref 132–146)
SODIUM BLD-SCNC: 126 MMOL/L (ref 132–146)
SODIUM BLD-SCNC: 134 MMOL/L (ref 132–146)
SODIUM BLD-SCNC: 134 MMOL/L (ref 132–146)
SODIUM BLD-SCNC: 135 MMOL/L (ref 132–146)
SODIUM URINE: <20 MMOL/L
SOURCE, BLOOD GAS: ABNORMAL
SPECIFIC GRAVITY UA: >=1.03 (ref 1–1.03)
SPHEROCYTES: ABNORMAL
T3 FREE: 1.2 PG/ML (ref 2–4.4)
T4 FREE: 0.82 NG/DL (ref 0.93–1.7)
T4 FREE: 1.1 NG/DL (ref 0.93–1.7)
THB: 11.7 G/DL (ref 11.5–16.5)
THB: 11.9 G/DL (ref 11.5–16.5)
THB: 12.1 G/DL (ref 11.5–16.5)
THB: 12.1 G/DL (ref 11.5–16.5)
THB: 12.4 G/DL (ref 11.5–16.5)
TIME ANALYZED: 1207
TIME ANALYZED: 1428
TIME ANALYZED: 1529
TIME ANALYZED: 237
TIME ANALYZED: 540
TOTAL CK: 88 U/L (ref 20–200)
TOTAL PROTEIN: 7 G/DL (ref 6.4–8.3)
TOTAL PROTEIN: 7.4 G/DL (ref 6.4–8.3)
TROPONIN, HIGH SENSITIVITY: 60 NG/L (ref 0–11)
TROPONIN, HIGH SENSITIVITY: 60 NG/L (ref 0–11)
TROPONIN, HIGH SENSITIVITY: 63 NG/L (ref 0–11)
TSH SERPL DL<=0.05 MIU/L-ACNC: 29.94 UIU/ML (ref 0.27–4.2)
TSH SERPL DL<=0.05 MIU/L-ACNC: 34.36 UIU/ML (ref 0.27–4.2)
UROBILINOGEN, URINE: 0.2 E.U./DL
VT MECHANICAL: 470 ML
VT MECHANICAL: 500 ML
VT MECHANICAL: 500 ML
WBC # BLD: 14.3 E9/L (ref 4.5–11.5)
WBC # BLD: 3.5 E9/L (ref 4.5–11.5)
WBC UA: ABNORMAL /HPF (ref 0–5)

## 2022-01-01 PROCEDURE — 83605 ASSAY OF LACTIC ACID: CPT

## 2022-01-01 PROCEDURE — 96361 HYDRATE IV INFUSION ADD-ON: CPT

## 2022-01-01 PROCEDURE — 85610 PROTHROMBIN TIME: CPT

## 2022-01-01 PROCEDURE — 2500000003 HC RX 250 WO HCPCS

## 2022-01-01 PROCEDURE — 83935 ASSAY OF URINE OSMOLALITY: CPT

## 2022-01-01 PROCEDURE — 6360000002 HC RX W HCPCS

## 2022-01-01 PROCEDURE — 2580000003 HC RX 258: Performed by: INTERNAL MEDICINE

## 2022-01-01 PROCEDURE — 82140 ASSAY OF AMMONIA: CPT

## 2022-01-01 PROCEDURE — 36620 INSERTION CATHETER ARTERY: CPT

## 2022-01-01 PROCEDURE — 84439 ASSAY OF FREE THYROXINE: CPT

## 2022-01-01 PROCEDURE — 82805 BLOOD GASES W/O2 SATURATION: CPT

## 2022-01-01 PROCEDURE — 6360000002 HC RX W HCPCS: Performed by: INTERNAL MEDICINE

## 2022-01-01 PROCEDURE — 70450 CT HEAD/BRAIN W/O DYE: CPT

## 2022-01-01 PROCEDURE — 94002 VENT MGMT INPAT INIT DAY: CPT

## 2022-01-01 PROCEDURE — 99223 1ST HOSP IP/OBS HIGH 75: CPT | Performed by: INTERNAL MEDICINE

## 2022-01-01 PROCEDURE — 2580000003 HC RX 258

## 2022-01-01 PROCEDURE — 74018 RADEX ABDOMEN 1 VIEW: CPT

## 2022-01-01 PROCEDURE — 82550 ASSAY OF CK (CPK): CPT

## 2022-01-01 PROCEDURE — 6360000002 HC RX W HCPCS: Performed by: STUDENT IN AN ORGANIZED HEALTH CARE EDUCATION/TRAINING PROGRAM

## 2022-01-01 PROCEDURE — 87502 INFLUENZA DNA AMP PROBE: CPT

## 2022-01-01 PROCEDURE — 6370000000 HC RX 637 (ALT 250 FOR IP): Performed by: STUDENT IN AN ORGANIZED HEALTH CARE EDUCATION/TRAINING PROGRAM

## 2022-01-01 PROCEDURE — 99239 HOSP IP/OBS DSCHRG MGMT >30: CPT | Performed by: INTERNAL MEDICINE

## 2022-01-01 PROCEDURE — 86900 BLOOD TYPING SEROLOGIC ABO: CPT

## 2022-01-01 PROCEDURE — 6370000000 HC RX 637 (ALT 250 FOR IP)

## 2022-01-01 PROCEDURE — 82044 UR ALBUMIN SEMIQUANTITATIVE: CPT

## 2022-01-01 PROCEDURE — 83735 ASSAY OF MAGNESIUM: CPT

## 2022-01-01 PROCEDURE — 94640 AIRWAY INHALATION TREATMENT: CPT

## 2022-01-01 PROCEDURE — 93005 ELECTROCARDIOGRAM TRACING: CPT

## 2022-01-01 PROCEDURE — 82962 GLUCOSE BLOOD TEST: CPT

## 2022-01-01 PROCEDURE — C9113 INJ PANTOPRAZOLE SODIUM, VIA: HCPCS

## 2022-01-01 PROCEDURE — 85025 COMPLETE CBC W/AUTO DIFF WBC: CPT

## 2022-01-01 PROCEDURE — 2500000003 HC RX 250 WO HCPCS: Performed by: STUDENT IN AN ORGANIZED HEALTH CARE EDUCATION/TRAINING PROGRAM

## 2022-01-01 PROCEDURE — 84133 ASSAY OF URINE POTASSIUM: CPT

## 2022-01-01 PROCEDURE — A4216 STERILE WATER/SALINE, 10 ML: HCPCS

## 2022-01-01 PROCEDURE — 84481 FREE ASSAY (FT-3): CPT

## 2022-01-01 PROCEDURE — 2500000003 HC RX 250 WO HCPCS: Performed by: INTERNAL MEDICINE

## 2022-01-01 PROCEDURE — 71250 CT THORAX DX C-: CPT

## 2022-01-01 PROCEDURE — 85651 RBC SED RATE NONAUTOMATED: CPT

## 2022-01-01 PROCEDURE — 87040 BLOOD CULTURE FOR BACTERIA: CPT

## 2022-01-01 PROCEDURE — 94660 CPAP INITIATION&MGMT: CPT

## 2022-01-01 PROCEDURE — 37799 UNLISTED PX VASCULAR SURGERY: CPT

## 2022-01-01 PROCEDURE — 80048 BASIC METABOLIC PNL TOTAL CA: CPT

## 2022-01-01 PROCEDURE — 31500 INSERT EMERGENCY AIRWAY: CPT

## 2022-01-01 PROCEDURE — 84132 ASSAY OF SERUM POTASSIUM: CPT

## 2022-01-01 PROCEDURE — 02HV33Z INSERTION OF INFUSION DEVICE INTO SUPERIOR VENA CAVA, PERCUTANEOUS APPROACH: ICD-10-PCS | Performed by: INTERNAL MEDICINE

## 2022-01-01 PROCEDURE — 36415 COLL VENOUS BLD VENIPUNCTURE: CPT

## 2022-01-01 PROCEDURE — 84443 ASSAY THYROID STIM HORMONE: CPT

## 2022-01-01 PROCEDURE — 94664 DEMO&/EVAL PT USE INHALER: CPT

## 2022-01-01 PROCEDURE — 84300 ASSAY OF URINE SODIUM: CPT

## 2022-01-01 PROCEDURE — 86850 RBC ANTIBODY SCREEN: CPT

## 2022-01-01 PROCEDURE — 36556 INSERT NON-TUNNEL CV CATH: CPT

## 2022-01-01 PROCEDURE — 74176 CT ABD & PELVIS W/O CONTRAST: CPT

## 2022-01-01 PROCEDURE — 80162 ASSAY OF DIGOXIN TOTAL: CPT

## 2022-01-01 PROCEDURE — 93005 ELECTROCARDIOGRAM TRACING: CPT | Performed by: STUDENT IN AN ORGANIZED HEALTH CARE EDUCATION/TRAINING PROGRAM

## 2022-01-01 PROCEDURE — 84145 PROCALCITONIN (PCT): CPT

## 2022-01-01 PROCEDURE — 2000000000 HC ICU R&B

## 2022-01-01 PROCEDURE — 84156 ASSAY OF PROTEIN URINE: CPT

## 2022-01-01 PROCEDURE — 84484 ASSAY OF TROPONIN QUANT: CPT

## 2022-01-01 PROCEDURE — 6370000000 HC RX 637 (ALT 250 FOR IP): Performed by: INTERNAL MEDICINE

## 2022-01-01 PROCEDURE — 83615 LACTATE (LD) (LDH) ENZYME: CPT

## 2022-01-01 PROCEDURE — 71045 X-RAY EXAM CHEST 1 VIEW: CPT

## 2022-01-01 PROCEDURE — C1751 CATH, INF, PER/CENT/MIDLINE: HCPCS

## 2022-01-01 PROCEDURE — 96375 TX/PRO/DX INJ NEW DRUG ADDON: CPT

## 2022-01-01 PROCEDURE — 96374 THER/PROPH/DIAG INJ IV PUSH: CPT

## 2022-01-01 PROCEDURE — 86901 BLOOD TYPING SEROLOGIC RH(D): CPT

## 2022-01-01 PROCEDURE — 82533 TOTAL CORTISOL: CPT

## 2022-01-01 PROCEDURE — 80053 COMPREHEN METABOLIC PANEL: CPT

## 2022-01-01 PROCEDURE — 96376 TX/PRO/DX INJ SAME DRUG ADON: CPT

## 2022-01-01 PROCEDURE — 82570 ASSAY OF URINE CREATININE: CPT

## 2022-01-01 PROCEDURE — 87081 CULTURE SCREEN ONLY: CPT

## 2022-01-01 PROCEDURE — 99285 EMERGENCY DEPT VISIT HI MDM: CPT

## 2022-01-01 PROCEDURE — 82436 ASSAY OF URINE CHLORIDE: CPT

## 2022-01-01 PROCEDURE — 93010 ELECTROCARDIOGRAM REPORT: CPT | Performed by: INTERNAL MEDICINE

## 2022-01-01 PROCEDURE — 0BH17EZ INSERTION OF ENDOTRACHEAL AIRWAY INTO TRACHEA, VIA NATURAL OR ARTIFICIAL OPENING: ICD-10-PCS

## 2022-01-01 PROCEDURE — 83880 ASSAY OF NATRIURETIC PEPTIDE: CPT

## 2022-01-01 PROCEDURE — 84100 ASSAY OF PHOSPHORUS: CPT

## 2022-01-01 PROCEDURE — 2580000003 HC RX 258: Performed by: STUDENT IN AN ORGANIZED HEALTH CARE EDUCATION/TRAINING PROGRAM

## 2022-01-01 PROCEDURE — 5A1935Z RESPIRATORY VENTILATION, LESS THAN 24 CONSECUTIVE HOURS: ICD-10-PCS

## 2022-01-01 PROCEDURE — 87635 SARS-COV-2 COVID-19 AMP PRB: CPT

## 2022-01-01 PROCEDURE — 86140 C-REACTIVE PROTEIN: CPT

## 2022-01-01 PROCEDURE — 83690 ASSAY OF LIPASE: CPT

## 2022-01-01 PROCEDURE — 81001 URINALYSIS AUTO W/SCOPE: CPT

## 2022-01-01 PROCEDURE — 0202U NFCT DS 22 TRGT SARS-COV-2: CPT

## 2022-01-01 RX ORDER — MORPHINE SULFATE 2 MG/ML
2 INJECTION, SOLUTION INTRAMUSCULAR; INTRAVENOUS EVERY 4 HOURS PRN
Status: DISCONTINUED | OUTPATIENT
Start: 2022-01-01 | End: 2022-01-01 | Stop reason: HOSPADM

## 2022-01-01 RX ORDER — LEVOTHYROXINE SODIUM ANHYDROUS 100 UG/5ML
200 INJECTION, POWDER, LYOPHILIZED, FOR SOLUTION INTRAVENOUS ONCE
Status: COMPLETED | OUTPATIENT
Start: 2022-01-01 | End: 2022-01-01

## 2022-01-01 RX ORDER — FENTANYL CITRATE-0.9 % NACL/PF 20 MCG/2ML
50 SYRINGE (ML) INTRAVENOUS EVERY 30 MIN PRN
Status: DISCONTINUED | OUTPATIENT
Start: 2022-01-01 | End: 2022-01-01 | Stop reason: HOSPADM

## 2022-01-01 RX ORDER — DOPAMINE HYDROCHLORIDE 320 MG/100ML
1-20 INJECTION, SOLUTION INTRAVENOUS CONTINUOUS
Status: DISCONTINUED | OUTPATIENT
Start: 2022-01-01 | End: 2022-01-01 | Stop reason: HOSPADM

## 2022-01-01 RX ORDER — 0.9 % SODIUM CHLORIDE 0.9 %
1000 INTRAVENOUS SOLUTION INTRAVENOUS ONCE
Status: COMPLETED | OUTPATIENT
Start: 2022-01-01 | End: 2022-01-01

## 2022-01-01 RX ORDER — ONDANSETRON 4 MG/1
4 TABLET, FILM COATED ORAL EVERY 6 HOURS PRN
Status: DISCONTINUED | OUTPATIENT
Start: 2022-01-01 | End: 2022-01-01 | Stop reason: HOSPADM

## 2022-01-01 RX ORDER — ETOMIDATE 2 MG/ML
20 INJECTION INTRAVENOUS ONCE
Status: COMPLETED | OUTPATIENT
Start: 2022-01-01 | End: 2022-01-01

## 2022-01-01 RX ORDER — SODIUM CHLORIDE FOR INHALATION 0.9 %
3 VIAL, NEBULIZER (ML) INHALATION
Status: DISCONTINUED | OUTPATIENT
Start: 2022-01-01 | End: 2022-01-01 | Stop reason: HOSPADM

## 2022-01-01 RX ORDER — POLYVINYL ALCOHOL 14 MG/ML
2 SOLUTION/ DROPS OPHTHALMIC
Status: DISCONTINUED | OUTPATIENT
Start: 2022-01-01 | End: 2022-01-01 | Stop reason: HOSPADM

## 2022-01-01 RX ORDER — GUAIFENESIN 100 MG/5ML
10 SYRUP ORAL EVERY 4 HOURS PRN
Status: DISCONTINUED | OUTPATIENT
Start: 2022-01-01 | End: 2022-01-01 | Stop reason: HOSPADM

## 2022-01-01 RX ORDER — BUDESONIDE 0.5 MG/2ML
0.5 INHALANT ORAL 2 TIMES DAILY
Status: DISCONTINUED | OUTPATIENT
Start: 2022-01-01 | End: 2022-01-01 | Stop reason: HOSPADM

## 2022-01-01 RX ORDER — SUCRALFATE 1 G/1
1 TABLET ORAL 3 TIMES DAILY
Status: DISCONTINUED | OUTPATIENT
Start: 2022-01-01 | End: 2022-01-01 | Stop reason: HOSPADM

## 2022-01-01 RX ORDER — ACETAMINOPHEN 325 MG/1
650 TABLET ORAL EVERY 4 HOURS PRN
Status: DISCONTINUED | OUTPATIENT
Start: 2022-01-01 | End: 2022-01-01 | Stop reason: HOSPADM

## 2022-01-01 RX ORDER — DEXTROSE MONOHYDRATE 100 MG/ML
INJECTION, SOLUTION INTRAVENOUS CONTINUOUS
Status: DISCONTINUED | OUTPATIENT
Start: 2022-01-01 | End: 2022-01-01 | Stop reason: HOSPADM

## 2022-01-01 RX ORDER — SODIUM CHLORIDE 0.9 % (FLUSH) 0.9 %
30 SYRINGE (ML) INJECTION PRN
Status: DISCONTINUED | OUTPATIENT
Start: 2022-01-01 | End: 2022-01-01 | Stop reason: HOSPADM

## 2022-01-01 RX ORDER — OLANZAPINE 5 MG/1
5 TABLET, ORALLY DISINTEGRATING ORAL DAILY PRN
Status: DISCONTINUED | OUTPATIENT
Start: 2022-01-01 | End: 2022-01-01 | Stop reason: HOSPADM

## 2022-01-01 RX ORDER — IPRATROPIUM BROMIDE AND ALBUTEROL SULFATE 2.5; .5 MG/3ML; MG/3ML
1 SOLUTION RESPIRATORY (INHALATION) ONCE
Status: COMPLETED | OUTPATIENT
Start: 2022-01-01 | End: 2022-01-01

## 2022-01-01 RX ORDER — LORAZEPAM 2 MG/ML
1 INJECTION INTRAMUSCULAR ONCE
Status: COMPLETED | OUTPATIENT
Start: 2022-01-01 | End: 2022-01-01

## 2022-01-01 RX ORDER — LEVETIRACETAM 10 MG/ML
1000 INJECTION INTRAVASCULAR ONCE
Status: COMPLETED | OUTPATIENT
Start: 2022-01-01 | End: 2022-01-01

## 2022-01-01 RX ORDER — ONDANSETRON 4 MG/1
4 TABLET, ORALLY DISINTEGRATING ORAL EVERY 6 HOURS PRN
Status: DISCONTINUED | OUTPATIENT
Start: 2022-01-01 | End: 2022-01-01 | Stop reason: HOSPADM

## 2022-01-01 RX ORDER — ALBUTEROL SULFATE 2.5 MG/3ML
2.5 SOLUTION RESPIRATORY (INHALATION)
Status: DISCONTINUED | OUTPATIENT
Start: 2022-01-01 | End: 2022-01-01 | Stop reason: HOSPADM

## 2022-01-01 RX ORDER — ARFORMOTEROL TARTRATE 15 UG/2ML
15 SOLUTION RESPIRATORY (INHALATION) 2 TIMES DAILY
Status: DISCONTINUED | OUTPATIENT
Start: 2022-01-01 | End: 2022-01-01 | Stop reason: HOSPADM

## 2022-01-01 RX ORDER — FENTANYL CITRATE 50 UG/ML
100 INJECTION, SOLUTION INTRAMUSCULAR; INTRAVENOUS ONCE
Status: COMPLETED | OUTPATIENT
Start: 2022-01-01 | End: 2022-01-01

## 2022-01-01 RX ORDER — DOXYCYCLINE HYCLATE 100 MG/1
100 CAPSULE ORAL EVERY 12 HOURS SCHEDULED
Status: DISCONTINUED | OUTPATIENT
Start: 2022-01-01 | End: 2022-01-01

## 2022-01-01 RX ORDER — GLYCOPYRROLATE 0.2 MG/ML
0.1 INJECTION INTRAMUSCULAR; INTRAVENOUS ONCE
Status: DISCONTINUED | OUTPATIENT
Start: 2022-01-01 | End: 2022-01-01 | Stop reason: HOSPADM

## 2022-01-01 RX ORDER — LOPERAMIDE HYDROCHLORIDE 2 MG/1
2 CAPSULE ORAL PRN
Status: DISCONTINUED | OUTPATIENT
Start: 2022-01-01 | End: 2022-01-01 | Stop reason: HOSPADM

## 2022-01-01 RX ORDER — SODIUM CHLORIDE 9 MG/ML
INJECTION, SOLUTION INTRAVENOUS PRN
Status: DISCONTINUED | OUTPATIENT
Start: 2022-01-01 | End: 2022-01-01 | Stop reason: HOSPADM

## 2022-01-01 RX ORDER — ACETAMINOPHEN 650 MG/1
650 SUPPOSITORY RECTAL EVERY 4 HOURS PRN
Status: DISCONTINUED | OUTPATIENT
Start: 2022-01-01 | End: 2022-01-01 | Stop reason: HOSPADM

## 2022-01-01 RX ORDER — DEXTROSE MONOHYDRATE 100 MG/ML
INJECTION, SOLUTION INTRAVENOUS CONTINUOUS PRN
Status: DISCONTINUED | OUTPATIENT
Start: 2022-01-01 | End: 2022-01-01 | Stop reason: HOSPADM

## 2022-01-01 RX ORDER — GUAIFENESIN/DEXTROMETHORPHAN 100-10MG/5
10 SYRUP ORAL EVERY 4 HOURS PRN
Status: DISCONTINUED | OUTPATIENT
Start: 2022-01-01 | End: 2022-01-01 | Stop reason: HOSPADM

## 2022-01-01 RX ORDER — LEVOTHYROXINE SODIUM 0.1 MG/1
200 TABLET ORAL DAILY
Status: DISCONTINUED | OUTPATIENT
Start: 2022-01-01 | End: 2022-01-01

## 2022-01-01 RX ORDER — CALCIUM GLUCONATE 94 MG/ML
1000 INJECTION, SOLUTION INTRAVENOUS ONCE
Status: COMPLETED | OUTPATIENT
Start: 2022-01-01 | End: 2022-01-01

## 2022-01-01 RX ORDER — POLYVINYL ALCOHOL 14 MG/ML
2 SOLUTION/ DROPS OPHTHALMIC 4 TIMES DAILY
Status: DISCONTINUED | OUTPATIENT
Start: 2022-01-01 | End: 2022-01-01 | Stop reason: HOSPADM

## 2022-01-01 RX ORDER — POLYETHYLENE GLYCOL 3350 17 G/17G
17 POWDER, FOR SOLUTION ORAL DAILY PRN
Status: DISCONTINUED | OUTPATIENT
Start: 2022-01-01 | End: 2022-01-01 | Stop reason: HOSPADM

## 2022-01-01 RX ORDER — ONDANSETRON 2 MG/ML
4 INJECTION INTRAMUSCULAR; INTRAVENOUS EVERY 6 HOURS PRN
Status: DISCONTINUED | OUTPATIENT
Start: 2022-01-01 | End: 2022-01-01

## 2022-01-01 RX ORDER — DEXTROSE MONOHYDRATE 100 MG/ML
INJECTION, SOLUTION INTRAVENOUS CONTINUOUS
Status: DISCONTINUED | OUTPATIENT
Start: 2022-01-01 | End: 2022-01-01

## 2022-01-01 RX ORDER — MAGNESIUM HYDROXIDE/ALUMINUM HYDROXICE/SIMETHICONE 120; 1200; 1200 MG/30ML; MG/30ML; MG/30ML
30 SUSPENSION ORAL EVERY 4 HOURS PRN
Status: DISCONTINUED | OUTPATIENT
Start: 2022-01-01 | End: 2022-01-01 | Stop reason: HOSPADM

## 2022-01-01 RX ORDER — BACLOFEN 10 MG/1
5 TABLET ORAL EVERY 12 HOURS PRN
Status: DISCONTINUED | OUTPATIENT
Start: 2022-01-01 | End: 2022-01-01 | Stop reason: HOSPADM

## 2022-01-01 RX ORDER — LACTULOSE 10 G/15ML
20 SOLUTION ORAL 3 TIMES DAILY
Status: DISCONTINUED | OUTPATIENT
Start: 2022-01-01 | End: 2022-01-01

## 2022-01-01 RX ORDER — FUROSEMIDE 10 MG/ML
40 INJECTION INTRAMUSCULAR; INTRAVENOUS DAILY
Status: DISCONTINUED | OUTPATIENT
Start: 2022-01-01 | End: 2022-01-01

## 2022-01-01 RX ORDER — ONDANSETRON 4 MG/1
4 TABLET, ORALLY DISINTEGRATING ORAL EVERY 8 HOURS PRN
Status: DISCONTINUED | OUTPATIENT
Start: 2022-01-01 | End: 2022-01-01

## 2022-01-01 RX ORDER — LEVOTHYROXINE SODIUM ANHYDROUS 100 UG/5ML
200 INJECTION, POWDER, LYOPHILIZED, FOR SOLUTION INTRAVENOUS DAILY
Status: DISCONTINUED | OUTPATIENT
Start: 2022-01-01 | End: 2022-01-01

## 2022-01-01 RX ORDER — SODIUM CHLORIDE 0.9 % (FLUSH) 0.9 %
5-40 SYRINGE (ML) INJECTION PRN
Status: DISCONTINUED | OUTPATIENT
Start: 2022-01-01 | End: 2022-01-01

## 2022-01-01 RX ORDER — HYDROXYZINE HYDROCHLORIDE 10 MG/1
10 TABLET, FILM COATED ORAL EVERY 4 HOURS PRN
Status: DISCONTINUED | OUTPATIENT
Start: 2022-01-01 | End: 2022-01-01 | Stop reason: HOSPADM

## 2022-01-01 RX ORDER — ACETAMINOPHEN 650 MG/1
650 SUPPOSITORY RECTAL EVERY 6 HOURS PRN
Status: DISCONTINUED | OUTPATIENT
Start: 2022-01-01 | End: 2022-01-01

## 2022-01-01 RX ORDER — METOPROLOL SUCCINATE 50 MG/1
50 TABLET, EXTENDED RELEASE ORAL DAILY
Status: DISCONTINUED | OUTPATIENT
Start: 2022-01-01 | End: 2022-01-01 | Stop reason: HOSPADM

## 2022-01-01 RX ORDER — LORAZEPAM 2 MG/ML
1 INJECTION INTRAMUSCULAR EVERY 6 HOURS PRN
Status: DISCONTINUED | OUTPATIENT
Start: 2022-01-01 | End: 2022-01-01 | Stop reason: HOSPADM

## 2022-01-01 RX ORDER — SODIUM CHLORIDE 9 MG/ML
INJECTION, SOLUTION INTRAVENOUS CONTINUOUS
Status: DISCONTINUED | OUTPATIENT
Start: 2022-01-01 | End: 2022-01-01

## 2022-01-01 RX ORDER — ACETAMINOPHEN 325 MG/1
650 TABLET ORAL EVERY 6 HOURS PRN
Status: DISCONTINUED | OUTPATIENT
Start: 2022-01-01 | End: 2022-01-01

## 2022-01-01 RX ORDER — PANTOPRAZOLE SODIUM 40 MG/1
40 TABLET, DELAYED RELEASE ORAL
Status: DISCONTINUED | OUTPATIENT
Start: 2022-01-01 | End: 2022-01-01 | Stop reason: HOSPADM

## 2022-01-01 RX ORDER — SODIUM CHLORIDE 0.9 % (FLUSH) 0.9 %
5-40 SYRINGE (ML) INJECTION EVERY 12 HOURS SCHEDULED
Status: DISCONTINUED | OUTPATIENT
Start: 2022-01-01 | End: 2022-01-01

## 2022-01-01 RX ADMIN — IPRATROPIUM BROMIDE AND ALBUTEROL SULFATE 1 AMPULE: 2.5; .5 SOLUTION RESPIRATORY (INHALATION) at 14:00

## 2022-01-01 RX ADMIN — ARFORMOTEROL TARTRATE 15 MCG: 15 SOLUTION RESPIRATORY (INHALATION) at 19:45

## 2022-01-01 RX ADMIN — BUDESONIDE 500 MCG: 0.5 SUSPENSION RESPIRATORY (INHALATION) at 19:45

## 2022-01-01 RX ADMIN — SUCRALFATE 1 G: 1 TABLET ORAL at 08:40

## 2022-01-01 RX ADMIN — HYDROCORTISONE SODIUM SUCCINATE 100 MG: 100 INJECTION, POWDER, FOR SOLUTION INTRAMUSCULAR; INTRAVENOUS at 06:59

## 2022-01-01 RX ADMIN — CALCIUM GLUCONATE 1000 MG: 98 INJECTION, SOLUTION INTRAVENOUS at 09:06

## 2022-01-01 RX ADMIN — IPRATROPIUM BROMIDE 0.5 MG: 0.5 SOLUTION RESPIRATORY (INHALATION) at 19:45

## 2022-01-01 RX ADMIN — Medication 2 MG/HR: at 04:38

## 2022-01-01 RX ADMIN — SODIUM CHLORIDE 40 MG: 9 INJECTION, SOLUTION INTRAMUSCULAR; INTRAVENOUS; SUBCUTANEOUS at 08:39

## 2022-01-01 RX ADMIN — SODIUM CHLORIDE 1000 ML: 9 INJECTION, SOLUTION INTRAVENOUS at 12:58

## 2022-01-01 RX ADMIN — DEXTROSE MONOHYDRATE 250 ML: 100 INJECTION, SOLUTION INTRAVENOUS at 23:14

## 2022-01-01 RX ADMIN — LEVETIRACETAM 1000 MG: 10 INJECTION INTRAVENOUS at 04:39

## 2022-01-01 RX ADMIN — SODIUM ZIRCONIUM CYCLOSILICATE 10 G: 10 POWDER, FOR SUSPENSION ORAL at 17:08

## 2022-01-01 RX ADMIN — IPRATROPIUM BROMIDE 0.5 MG: 0.5 SOLUTION RESPIRATORY (INHALATION) at 06:13

## 2022-01-01 RX ADMIN — MORPHINE SULFATE 2 MG: 2 INJECTION, SOLUTION INTRAMUSCULAR; INTRAVENOUS at 11:06

## 2022-01-01 RX ADMIN — CALCIUM GLUCONATE 1000 MG: 98 INJECTION, SOLUTION INTRAVENOUS at 16:35

## 2022-01-01 RX ADMIN — HYDROCORTISONE SODIUM SUCCINATE 100 MG: 100 INJECTION, POWDER, FOR SOLUTION INTRAMUSCULAR; INTRAVENOUS at 23:07

## 2022-01-01 RX ADMIN — INSULIN HUMAN 10 UNITS: 100 INJECTION, SOLUTION PARENTERAL at 13:54

## 2022-01-01 RX ADMIN — ALBUTEROL SULFATE 10 MG: 2.5 SOLUTION RESPIRATORY (INHALATION) at 14:01

## 2022-01-01 RX ADMIN — FUROSEMIDE 40 MG: 10 INJECTION, SOLUTION INTRAMUSCULAR; INTRAVENOUS at 16:32

## 2022-01-01 RX ADMIN — SODIUM BICARBONATE 50 MEQ: 84 INJECTION, SOLUTION INTRAVENOUS at 13:59

## 2022-01-01 RX ADMIN — ARFORMOTEROL TARTRATE 15 MCG: 15 SOLUTION RESPIRATORY (INHALATION) at 06:13

## 2022-01-01 RX ADMIN — INSULIN HUMAN 10 UNITS: 100 INJECTION, SOLUTION PARENTERAL at 23:12

## 2022-01-01 RX ADMIN — SODIUM ZIRCONIUM CYCLOSILICATE 10 G: 10 POWDER, FOR SUSPENSION ORAL at 09:14

## 2022-01-01 RX ADMIN — BUDESONIDE 500 MCG: 0.5 SUSPENSION RESPIRATORY (INHALATION) at 06:13

## 2022-01-01 RX ADMIN — DEXTROSE MONOHYDRATE 250 ML: 100 INJECTION, SOLUTION INTRAVENOUS at 22:34

## 2022-01-01 RX ADMIN — ETOMIDATE INJECTION 10 MG: 2 SOLUTION INTRAVENOUS at 04:05

## 2022-01-01 RX ADMIN — LORAZEPAM 1 MG: 2 INJECTION INTRAMUSCULAR; INTRAVENOUS at 11:06

## 2022-01-01 RX ADMIN — DEXTROSE MONOHYDRATE 250 ML: 100 INJECTION, SOLUTION INTRAVENOUS at 03:00

## 2022-01-01 RX ADMIN — SODIUM CHLORIDE 1000 ML: 9 INJECTION, SOLUTION INTRAVENOUS at 13:47

## 2022-01-01 RX ADMIN — SODIUM BICARBONATE: 84 INJECTION, SOLUTION INTRAVENOUS at 17:11

## 2022-01-01 RX ADMIN — BUMETANIDE 0.5 MG/HR: 0.25 INJECTION INTRAMUSCULAR; INTRAVENOUS at 05:27

## 2022-01-01 RX ADMIN — DEXTROSE MONOHYDRATE: 100 INJECTION, SOLUTION INTRAVENOUS at 01:46

## 2022-01-01 RX ADMIN — SODIUM ZIRCONIUM CYCLOSILICATE 10 G: 10 POWDER, FOR SUSPENSION ORAL at 05:21

## 2022-01-01 RX ADMIN — LACTULOSE 20 G: 20 SOLUTION ORAL at 08:40

## 2022-01-01 RX ADMIN — DEXTROSE MONOHYDRATE 250 ML: 100 INJECTION, SOLUTION INTRAVENOUS at 21:54

## 2022-01-01 RX ADMIN — NOREPINEPHRINE BITARTRATE 2 MCG/MIN: 1 INJECTION, SOLUTION, CONCENTRATE INTRAVENOUS at 02:56

## 2022-01-01 RX ADMIN — Medication 50 MCG/HR: at 04:36

## 2022-01-01 RX ADMIN — LORAZEPAM 1 MG: 2 INJECTION INTRAMUSCULAR; INTRAVENOUS at 19:57

## 2022-01-01 RX ADMIN — FENTANYL CITRATE 50 MCG: 50 INJECTION INTRAMUSCULAR; INTRAVENOUS at 04:06

## 2022-01-01 RX ADMIN — DEXTROSE MONOHYDRATE 250 ML: 100 INJECTION, SOLUTION INTRAVENOUS at 01:07

## 2022-01-01 RX ADMIN — DEXTROSE MONOHYDRATE 250 ML: 100 INJECTION, SOLUTION INTRAVENOUS at 16:51

## 2022-01-01 RX ADMIN — INSULIN HUMAN 10 UNITS: 100 INJECTION, SOLUTION PARENTERAL at 16:44

## 2022-01-01 RX ADMIN — LEVOTHYROXINE SODIUM ANHYDROUS 200 MCG: 200 INJECTION, POWDER, LYOPHILIZED, FOR SOLUTION INTRAVENOUS at 23:07

## 2022-01-01 RX ADMIN — CALCIUM GLUCONATE 1000 MG: 98 INJECTION, SOLUTION INTRAVENOUS at 13:48

## 2022-01-01 RX ADMIN — DOPAMINE HYDROCHLORIDE 5 MCG/KG/MIN: 320 INJECTION, SOLUTION INTRAVENOUS at 07:43

## 2022-01-01 RX ADMIN — DOXYCYCLINE 100 MG: 100 INJECTION, POWDER, LYOPHILIZED, FOR SOLUTION INTRAVENOUS at 23:37

## 2022-01-01 RX ADMIN — LORAZEPAM 1 MG: 2 INJECTION INTRAMUSCULAR; INTRAVENOUS at 03:40

## 2022-01-01 RX ADMIN — DEXTROSE MONOHYDRATE 250 ML: 100 INJECTION, SOLUTION INTRAVENOUS at 14:05

## 2022-01-01 ASSESSMENT — ENCOUNTER SYMPTOMS
ABDOMINAL PAIN: 0
EYE PAIN: 0
WHEEZING: 0
DIARRHEA: 0
SHORTNESS OF BREATH: 0
BACK PAIN: 0
EYE DISCHARGE: 0
NAUSEA: 0
SINUS PRESSURE: 0
SHORTNESS OF BREATH: 1
EYE REDNESS: 0
VOMITING: 0
CONSTIPATION: 0
SORE THROAT: 0
COUGH: 0
RHINORRHEA: 0

## 2022-01-01 ASSESSMENT — PAIN SCALES - GENERAL: PAINLEVEL_OUTOF10: 0

## 2022-01-01 ASSESSMENT — PULMONARY FUNCTION TESTS
PIF_VALUE: 32
PIF_VALUE: 32
PIF_VALUE: 28

## 2022-05-02 ENCOUNTER — APPOINTMENT (OUTPATIENT)
Dept: GENERAL RADIOLOGY | Age: 77
DRG: 291 | End: 2022-05-02
Payer: OTHER GOVERNMENT

## 2022-05-02 ENCOUNTER — HOSPITAL ENCOUNTER (INPATIENT)
Age: 77
LOS: 9 days | Discharge: HOME HEALTH | DRG: 291 | End: 2022-05-11
Attending: EMERGENCY MEDICINE | Admitting: INTERNAL MEDICINE
Payer: OTHER GOVERNMENT

## 2022-05-02 DIAGNOSIS — I50.9 ACUTE CONGESTIVE HEART FAILURE, UNSPECIFIED HEART FAILURE TYPE (HCC): Primary | ICD-10-CM

## 2022-05-02 PROBLEM — J96.12 CHRONIC RESPIRATORY FAILURE WITH HYPOXIA AND HYPERCAPNIA (HCC): Status: ACTIVE | Noted: 2018-12-07

## 2022-05-02 PROBLEM — N18.30 CKD (CHRONIC KIDNEY DISEASE) STAGE 3, GFR 30-59 ML/MIN (HCC): Status: ACTIVE | Noted: 2022-01-01

## 2022-05-02 PROBLEM — D50.9 MICROCYTIC ANEMIA: Status: ACTIVE | Noted: 2022-05-02

## 2022-05-02 PROBLEM — J96.11 CHRONIC RESPIRATORY FAILURE WITH HYPOXIA AND HYPERCAPNIA (HCC): Status: ACTIVE | Noted: 2018-12-07

## 2022-05-02 LAB
ABO/RH: NORMAL
ANION GAP SERPL CALCULATED.3IONS-SCNC: 3 MMOL/L (ref 7–16)
ANISOCYTOSIS: ABNORMAL
ANTIBODY SCREEN: NORMAL
B.E.: 13.2 MMOL/L (ref -3–3)
BACTERIA: NORMAL /HPF
BASOPHILS ABSOLUTE: 0.03 E9/L (ref 0–0.2)
BASOPHILS RELATIVE PERCENT: 0.5 % (ref 0–2)
BILIRUBIN URINE: NEGATIVE
BLOOD, URINE: NORMAL
BUN BLDV-MCNC: 24 MG/DL (ref 6–23)
CALCIUM SERPL-MCNC: 9.1 MG/DL (ref 8.6–10.2)
CHLORIDE BLD-SCNC: 97 MMOL/L (ref 98–107)
CLARITY: CLEAR
CO2: 43 MMOL/L (ref 22–29)
COHB: 1.7 % (ref 0–1.5)
COLOR: YELLOW
CREAT SERPL-MCNC: 1.6 MG/DL (ref 0.7–1.2)
CRITICAL: ABNORMAL
DATE ANALYZED: ABNORMAL
DATE OF COLLECTION: ABNORMAL
EKG ATRIAL RATE: 80 BPM
EKG Q-T INTERVAL: 408 MS
EKG QRS DURATION: 108 MS
EKG QTC CALCULATION (BAZETT): 499 MS
EKG R AXIS: 77 DEGREES
EKG T AXIS: -78 DEGREES
EKG VENTRICULAR RATE: 90 BPM
EOSINOPHILS ABSOLUTE: 0.19 E9/L (ref 0.05–0.5)
EOSINOPHILS RELATIVE PERCENT: 3.2 % (ref 0–6)
FERRITIN: 20 NG/ML
GFR AFRICAN AMERICAN: 51
GFR NON-AFRICAN AMERICAN: 42 ML/MIN/1.73
GLUCOSE BLD-MCNC: 90 MG/DL (ref 74–99)
GLUCOSE URINE: NEGATIVE MG/DL
HCO3: 40.5 MMOL/L (ref 22–26)
HCT VFR BLD CALC: 30.1 % (ref 37–54)
HEMOGLOBIN: 7.3 G/DL (ref 12.5–16.5)
HHB: 5.4 % (ref 0–5)
HYPOCHROMIA: ABNORMAL
IMMATURE GRANULOCYTES #: 0.02 E9/L
IMMATURE GRANULOCYTES %: 0.3 % (ref 0–5)
INR BLD: 1.5
IRON SATURATION: 6 % (ref 20–55)
IRON SATURATION: 6 % (ref 20–55)
IRON: 21 MCG/DL (ref 59–158)
IRON: 22 MCG/DL (ref 59–158)
KETONES, URINE: NEGATIVE MG/DL
LAB: ABNORMAL
LEUKOCYTE ESTERASE, URINE: NEGATIVE
LYMPHOCYTES ABSOLUTE: 0.73 E9/L (ref 1.5–4)
LYMPHOCYTES RELATIVE PERCENT: 12.4 % (ref 20–42)
Lab: ABNORMAL
MCH RBC QN AUTO: 17.7 PG (ref 26–35)
MCHC RBC AUTO-ENTMCNC: 24.3 % (ref 32–34.5)
MCV RBC AUTO: 73.1 FL (ref 80–99.9)
METHB: 0.4 % (ref 0–1.5)
MODE: ABNORMAL
MONOCYTES ABSOLUTE: 0.66 E9/L (ref 0.1–0.95)
MONOCYTES RELATIVE PERCENT: 11.2 % (ref 2–12)
NEUTROPHILS ABSOLUTE: 4.25 E9/L (ref 1.8–7.3)
NEUTROPHILS RELATIVE PERCENT: 72.4 % (ref 43–80)
NITRITE, URINE: NEGATIVE
O2 CONTENT: 10.8 ML/DL
O2 SATURATION: 94.5 % (ref 92–98.5)
O2HB: 92.5 % (ref 94–97)
OPERATOR ID: 8217
OVALOCYTES: ABNORMAL
PATIENT TEMP: 37 C
PCO2: 73.1 MMHG (ref 35–45)
PDW BLD-RTO: 25.2 FL (ref 11.5–15)
PH BLOOD GAS: 7.36 (ref 7.35–7.45)
PH UA: 7 (ref 5–9)
PLATELET # BLD: 264 E9/L (ref 130–450)
PMV BLD AUTO: 9.8 FL (ref 7–12)
PO2: 80.8 MMHG (ref 75–100)
POIKILOCYTES: ABNORMAL
POLYCHROMASIA: ABNORMAL
POTASSIUM REFLEX MAGNESIUM: 4 MMOL/L (ref 3.5–5)
PRO-BNP: 2562 PG/ML (ref 0–450)
PROTEIN UA: NEGATIVE MG/DL
PROTHROMBIN TIME: 17.1 SEC (ref 9.3–12.4)
RBC # BLD: 4.12 E12/L (ref 3.8–5.8)
RBC UA: NORMAL /HPF (ref 0–2)
SODIUM BLD-SCNC: 143 MMOL/L (ref 132–146)
SOURCE, BLOOD GAS: ABNORMAL
SPECIFIC GRAVITY UA: 1.01 (ref 1–1.03)
TEAR DROP CELLS: ABNORMAL
THB: 8.2 G/DL (ref 11.5–16.5)
TIME ANALYZED: 1240
TOTAL IRON BINDING CAPACITY: 370 MCG/DL (ref 250–450)
TOTAL IRON BINDING CAPACITY: 376 MCG/DL (ref 250–450)
TROPONIN, HIGH SENSITIVITY: 69 NG/L (ref 0–11)
TROPONIN, HIGH SENSITIVITY: 72 NG/L (ref 0–11)
UROBILINOGEN, URINE: 0.2 E.U./DL
WBC # BLD: 5.9 E9/L (ref 4.5–11.5)
WBC UA: NORMAL /HPF (ref 0–5)

## 2022-05-02 PROCEDURE — 93005 ELECTROCARDIOGRAM TRACING: CPT | Performed by: STUDENT IN AN ORGANIZED HEALTH CARE EDUCATION/TRAINING PROGRAM

## 2022-05-02 PROCEDURE — 86901 BLOOD TYPING SEROLOGIC RH(D): CPT

## 2022-05-02 PROCEDURE — 80048 BASIC METABOLIC PNL TOTAL CA: CPT

## 2022-05-02 PROCEDURE — 84484 ASSAY OF TROPONIN QUANT: CPT

## 2022-05-02 PROCEDURE — 2700000000 HC OXYGEN THERAPY PER DAY

## 2022-05-02 PROCEDURE — 6360000002 HC RX W HCPCS: Performed by: STUDENT IN AN ORGANIZED HEALTH CARE EDUCATION/TRAINING PROGRAM

## 2022-05-02 PROCEDURE — 99254 IP/OBS CNSLTJ NEW/EST MOD 60: CPT | Performed by: SURGERY

## 2022-05-02 PROCEDURE — 2500000003 HC RX 250 WO HCPCS: Performed by: INTERNAL MEDICINE

## 2022-05-02 PROCEDURE — 2580000003 HC RX 258: Performed by: STUDENT IN AN ORGANIZED HEALTH CARE EDUCATION/TRAINING PROGRAM

## 2022-05-02 PROCEDURE — P9016 RBC LEUKOCYTES REDUCED: HCPCS

## 2022-05-02 PROCEDURE — 82805 BLOOD GASES W/O2 SATURATION: CPT

## 2022-05-02 PROCEDURE — 81001 URINALYSIS AUTO W/SCOPE: CPT

## 2022-05-02 PROCEDURE — 83550 IRON BINDING TEST: CPT

## 2022-05-02 PROCEDURE — 99285 EMERGENCY DEPT VISIT HI MDM: CPT

## 2022-05-02 PROCEDURE — 83540 ASSAY OF IRON: CPT

## 2022-05-02 PROCEDURE — 86923 COMPATIBILITY TEST ELECTRIC: CPT

## 2022-05-02 PROCEDURE — 93010 ELECTROCARDIOGRAM REPORT: CPT | Performed by: INTERNAL MEDICINE

## 2022-05-02 PROCEDURE — 2580000003 HC RX 258: Performed by: INTERNAL MEDICINE

## 2022-05-02 PROCEDURE — 85610 PROTHROMBIN TIME: CPT

## 2022-05-02 PROCEDURE — 85025 COMPLETE CBC W/AUTO DIFF WBC: CPT

## 2022-05-02 PROCEDURE — 86900 BLOOD TYPING SEROLOGIC ABO: CPT

## 2022-05-02 PROCEDURE — 2140000000 HC CCU INTERMEDIATE R&B

## 2022-05-02 PROCEDURE — 96374 THER/PROPH/DIAG INJ IV PUSH: CPT

## 2022-05-02 PROCEDURE — C9113 INJ PANTOPRAZOLE SODIUM, VIA: HCPCS | Performed by: STUDENT IN AN ORGANIZED HEALTH CARE EDUCATION/TRAINING PROGRAM

## 2022-05-02 PROCEDURE — 86850 RBC ANTIBODY SCREEN: CPT

## 2022-05-02 PROCEDURE — 83880 ASSAY OF NATRIURETIC PEPTIDE: CPT

## 2022-05-02 PROCEDURE — 82728 ASSAY OF FERRITIN: CPT

## 2022-05-02 PROCEDURE — 36415 COLL VENOUS BLD VENIPUNCTURE: CPT

## 2022-05-02 PROCEDURE — 71045 X-RAY EXAM CHEST 1 VIEW: CPT

## 2022-05-02 PROCEDURE — 6370000000 HC RX 637 (ALT 250 FOR IP): Performed by: INTERNAL MEDICINE

## 2022-05-02 RX ORDER — ALBUTEROL SULFATE 90 UG/1
1 AEROSOL, METERED RESPIRATORY (INHALATION) EVERY 6 HOURS PRN
Status: DISCONTINUED | OUTPATIENT
Start: 2022-05-02 | End: 2022-05-02 | Stop reason: SDUPTHER

## 2022-05-02 RX ORDER — ONDANSETRON 2 MG/ML
4 INJECTION INTRAMUSCULAR; INTRAVENOUS EVERY 6 HOURS PRN
Status: DISCONTINUED | OUTPATIENT
Start: 2022-05-02 | End: 2022-05-12 | Stop reason: HOSPADM

## 2022-05-02 RX ORDER — ACETAMINOPHEN 325 MG/1
650 TABLET ORAL EVERY 6 HOURS PRN
Status: DISCONTINUED | OUTPATIENT
Start: 2022-05-02 | End: 2022-05-12 | Stop reason: HOSPADM

## 2022-05-02 RX ORDER — SODIUM CHLORIDE 0.9 % (FLUSH) 0.9 %
5-40 SYRINGE (ML) INJECTION EVERY 12 HOURS SCHEDULED
Status: DISCONTINUED | OUTPATIENT
Start: 2022-05-02 | End: 2022-05-12 | Stop reason: HOSPADM

## 2022-05-02 RX ORDER — FUROSEMIDE 10 MG/ML
60 INJECTION INTRAMUSCULAR; INTRAVENOUS ONCE
Status: COMPLETED | OUTPATIENT
Start: 2022-05-02 | End: 2022-05-02

## 2022-05-02 RX ORDER — METOPROLOL SUCCINATE 50 MG/1
50 TABLET, EXTENDED RELEASE ORAL DAILY
Status: DISCONTINUED | OUTPATIENT
Start: 2022-05-03 | End: 2022-05-03

## 2022-05-02 RX ORDER — METOPROLOL SUCCINATE 50 MG/1
50 TABLET, EXTENDED RELEASE ORAL DAILY
COMMUNITY

## 2022-05-02 RX ORDER — DOXAZOSIN 2 MG/1
2 TABLET ORAL DAILY
Status: DISCONTINUED | OUTPATIENT
Start: 2022-05-02 | End: 2022-05-07

## 2022-05-02 RX ORDER — BUDESONIDE, GLYCOPYRROLATE, AND FORMOTEROL FUMARATE 160; 9; 4.8 UG/1; UG/1; UG/1
2 AEROSOL, METERED RESPIRATORY (INHALATION) 2 TIMES DAILY
COMMUNITY

## 2022-05-02 RX ORDER — ACETAMINOPHEN 650 MG/1
650 SUPPOSITORY RECTAL EVERY 6 HOURS PRN
Status: DISCONTINUED | OUTPATIENT
Start: 2022-05-02 | End: 2022-05-12 | Stop reason: HOSPADM

## 2022-05-02 RX ORDER — PANTOPRAZOLE SODIUM 40 MG/10ML
40 INJECTION, POWDER, LYOPHILIZED, FOR SOLUTION INTRAVENOUS 2 TIMES DAILY
Status: DISCONTINUED | OUTPATIENT
Start: 2022-05-03 | End: 2022-05-05 | Stop reason: SDUPTHER

## 2022-05-02 RX ORDER — ALBUTEROL SULFATE 2.5 MG/3ML
2.5 SOLUTION RESPIRATORY (INHALATION) EVERY 6 HOURS PRN
COMMUNITY

## 2022-05-02 RX ORDER — SODIUM CHLORIDE 0.9 % (FLUSH) 0.9 %
10 SYRINGE (ML) INJECTION PRN
Status: DISCONTINUED | OUTPATIENT
Start: 2022-05-02 | End: 2022-05-12 | Stop reason: HOSPADM

## 2022-05-02 RX ORDER — ALBUTEROL SULFATE 90 UG/1
1 AEROSOL, METERED RESPIRATORY (INHALATION) EVERY 6 HOURS PRN
COMMUNITY

## 2022-05-02 RX ORDER — LEVOTHYROXINE SODIUM 0.05 MG/1
50 TABLET ORAL DAILY
COMMUNITY

## 2022-05-02 RX ORDER — FLUTICASONE PROPIONATE 50 MCG
2 SPRAY, SUSPENSION (ML) NASAL DAILY
Status: DISCONTINUED | OUTPATIENT
Start: 2022-05-02 | End: 2022-05-12 | Stop reason: HOSPADM

## 2022-05-02 RX ORDER — LEVOTHYROXINE SODIUM 0.2 MG/1
200 TABLET ORAL DAILY
Status: DISCONTINUED | OUTPATIENT
Start: 2022-05-03 | End: 2022-05-12 | Stop reason: HOSPADM

## 2022-05-02 RX ORDER — SODIUM CHLORIDE 9 MG/ML
INJECTION, SOLUTION INTRAVENOUS PRN
Status: DISCONTINUED | OUTPATIENT
Start: 2022-05-02 | End: 2022-05-12 | Stop reason: HOSPADM

## 2022-05-02 RX ORDER — BUMETANIDE 0.25 MG/ML
1 INJECTION, SOLUTION INTRAMUSCULAR; INTRAVENOUS 2 TIMES DAILY
Status: DISCONTINUED | OUTPATIENT
Start: 2022-05-02 | End: 2022-05-03

## 2022-05-02 RX ORDER — ALBUTEROL SULFATE 2.5 MG/3ML
2.5 SOLUTION RESPIRATORY (INHALATION) EVERY 6 HOURS PRN
Status: DISCONTINUED | OUTPATIENT
Start: 2022-05-02 | End: 2022-05-12 | Stop reason: HOSPADM

## 2022-05-02 RX ORDER — KETOTIFEN FUMARATE 0.35 MG/ML
1 SOLUTION/ DROPS OPHTHALMIC 2 TIMES DAILY PRN
COMMUNITY

## 2022-05-02 RX ORDER — ONDANSETRON 4 MG/1
4 TABLET, ORALLY DISINTEGRATING ORAL EVERY 8 HOURS PRN
Status: DISCONTINUED | OUTPATIENT
Start: 2022-05-02 | End: 2022-05-12 | Stop reason: HOSPADM

## 2022-05-02 RX ORDER — TERAZOSIN 2 MG/1
4 CAPSULE ORAL NIGHTLY
COMMUNITY

## 2022-05-02 RX ORDER — FLUTICASONE PROPIONATE 50 MCG
2 SPRAY, SUSPENSION (ML) NASAL DAILY
COMMUNITY

## 2022-05-02 RX ORDER — VITAMIN B COMPLEX
1000 TABLET ORAL DAILY
Status: DISCONTINUED | OUTPATIENT
Start: 2022-05-02 | End: 2022-05-12 | Stop reason: HOSPADM

## 2022-05-02 RX ORDER — LEVOTHYROXINE SODIUM 0.2 MG/1
200 TABLET ORAL DAILY
COMMUNITY

## 2022-05-02 RX ORDER — LEVOTHYROXINE SODIUM 0.05 MG/1
50 TABLET ORAL DAILY
Status: DISCONTINUED | OUTPATIENT
Start: 2022-05-03 | End: 2022-05-12 | Stop reason: HOSPADM

## 2022-05-02 RX ADMIN — BUMETANIDE 1 MG: 0.25 INJECTION, SOLUTION INTRAMUSCULAR; INTRAVENOUS at 17:43

## 2022-05-02 RX ADMIN — DOXAZOSIN 2 MG: 2 TABLET ORAL at 17:50

## 2022-05-02 RX ADMIN — PANTOPRAZOLE SODIUM 80 MG: 40 INJECTION, POWDER, FOR SOLUTION INTRAVENOUS at 14:05

## 2022-05-02 RX ADMIN — Medication 10 ML: at 22:58

## 2022-05-02 RX ADMIN — FUROSEMIDE 60 MG: 10 INJECTION INTRAMUSCULAR; INTRAVENOUS at 13:00

## 2022-05-02 ASSESSMENT — ENCOUNTER SYMPTOMS
BLOOD IN STOOL: 1
EYE REDNESS: 0
VOMITING: 0
CHEST TIGHTNESS: 0
BACK PAIN: 0
COUGH: 0
NAUSEA: 0
DIARRHEA: 0
SORE THROAT: 0
SHORTNESS OF BREATH: 1
SINUS PRESSURE: 0
EYE PAIN: 0
SINUS PAIN: 0
CONSTIPATION: 0
ABDOMINAL PAIN: 0

## 2022-05-02 NOTE — ED NOTES
Attempted to insert urinary catheter as ordered, but due to swelling, unable to successfully do so. External drainage system placed ER Resident made aware of same.      Hema Nicholson RN  05/02/22 0016

## 2022-05-02 NOTE — CONSULTS
GENERAL SURGERY  CONSULT NOTE  5/2/2022    HPI  Kuldeep Sawant is a 78 y.o. male with hx of CHF (EF 60% 2016), COPD, AAA >39 mm, colon polyps, and GERD who presents for evaluation of worsening SOB and peripheral edema. Patient has had complaint of dark tarry stools recently as well. He states that three days ago he had one episode of \"darker than normal stool\" but has had normal brown bowel movements since. He denies abdominal pain, weight loss, nausea, vomiting, bloody stools, reflux symptoms. On Xarelto for hx of afib    EGD 12/2019 showed severe gastritis with healing antral ulcers   Colonoscopy 12/2019 showed polyps as well as diverticulosis     Afebrile, hemodynamically stable   Hb 7.3 on admission (baseline: 14-15)  BNP 2562     Past Medical History:   Diagnosis Date    Abdominal aortic aneurysm (AAA) >39 mm diameter (HCC)     Anemia     Cardiomyopathy ([address]     Carpal tunnel syndrome     CHF (congestive heart failure) (HCC)     echo 3/5/16 ef 85%, stage 1 diastolic    COPD (chronic obstructive pulmonary disease) (HCC)     GERD (gastroesophageal reflux disease)     Hx of colonic polyps     Hypertension     Hypothyroidism     Rectal bleeding     Respiratory neoplasm     Sleep apnea     Tobacco use        Past Surgical History:   Procedure Laterality Date    ABDOMEN SURGERY      CARDIAC CATHETERIZATION      CHOLECYSTECTOMY      COLONOSCOPY      COLONOSCOPY N/A 12/9/2018    COLONOSCOPY DIAGNOSTIC performed by Karina Nunn MD at [facility], ESOPHAGUS      ENDOSCOPY, COLON, DIAGNOSTIC      FRACTURE SURGERY      JOINT REPLACEMENT      SALIVARY GLAND SURGERY      UPPER GASTROINTESTINAL ENDOSCOPY N/A 12/9/2018    EGD DIAGNOSTIC ONLY performed by Karina Nunn MD at [facility]. [address]         Medications Prior to Admission    Prior to Admission medications    Medication Sig Start Date End Date Taking?  Authorizing Provider   fluticasone (FLONASE) 50 MCG/ACT nasal spray 2 sprays by Nasal route daily   Yes Historical Provider, MD   levothyroxine (SYNTHROID) 50 MCG tablet Take 50 mcg by mouth Daily Given with Levothyroxine 200 mcg total dose Levothyroxine 250 mcg   Yes Historical Provider, MD   levothyroxine (SYNTHROID) 200 MCG tablet Take 200 mcg by mouth Daily Given with Levothyroxine 50 mcg total dose Levothyroxine 250 mcg   Yes Historical Provider, MD   metoprolol succinate (TOPROL XL) 50 MG extended release tablet Take 50 mg by mouth daily   Yes Historical Provider, MD   ketotifen (ZADITOR) 0.025 % ophthalmic solution Place 1 drop into both eyes 2 times daily as needed   Yes Historical Provider, MD   albuterol sulfate HFA (VENTOLIN HFA) 108 (90 Base) MCG/ACT inhaler Inhale 1 puff into the lungs every 6 hours as needed for Wheezing   Yes Historical Provider, MD   terazosin (HYTRIN) 2 MG capsule Take 4 mg by mouth nightly   Yes Historical Provider, MD   Budeson-Glycopyrrol-Formoterol (BREZTRI AEROSPHERE) 160-9-4.8 MCG/ACT AERO Inhale 2 puffs into the lungs 2 times daily   Yes Historical Provider, MD   albuterol (PROVENTIL) (2.5 MG/3ML) 0.083% nebulizer solution Take 2.5 mg by nebulization every 6 hours as needed for Wheezing   Yes Historical Provider, MD   sodium chloride (OCEAN) 0.65 % nasal spray 1 spray by Nasal route daily   Yes Historical Provider, MD   OXYGEN Inhale 4 L into the lungs continuous   Yes Historical Provider, MD   torsemide (DEMADEX) 20 MG tablet Take 40 mg by mouth 2 times daily    Historical Provider, MD   rivaroxaban (XARELTO) 15 MG TABS tablet Take 15 mg by mouth Daily with supper     Historical Provider, MD   vitamin D 25 MCG (1000 UT) CAPS Take 1,000 Units by mouth daily     Historical Provider, MD   sucralfate (CARAFATE) 1 GM tablet Take 1 tablet by mouth three times daily 12/13/18 1/12/19  Juanita Rios MD       Allergies   Allergen Reactions    Amlodipine Swelling    Isosorbide Mononitrate [Isosorbide Nitrate] Other (See Comments)     Migraine headaches    Lisinopril Rash     cough       No family history on file. Social History     Tobacco Use    Smoking status: Former Smoker     Quit date: 2000     Years since quittin.3    Smokeless tobacco: Never Used   Substance Use Topics    Alcohol use: Yes     Alcohol/week: 21.0 standard drinks     Types: 21 Shots of liquor per week     Comment: hasn't drank in three weeks    Drug use: Not on file         Review of Systems: pertinent ROS listed in HPI, all others negative       PHYSICAL EXAM:    Vitals:    22 1546   BP: 127/78   Pulse: 97   Resp: 18   Temp: 97.9 °F (36.6 °C)   SpO2: 99%       GENERAL:  NAD. A&Ox3. HEAD:  Normocephalic. Atraumatic. EYES:   No scleral icterus. PERRL. LUNGS:  No increased work of breathing. CARDIOVASCULAR: RR  ABDOMEN:  Soft, non-distended, non-tender. No guarding, rigidity, rebound. EXTREMITIES:   MAEx4. Atraumatic. No LE edema. SKIN:  Warm and dry  NEUROLOGIC:  GCS 15  RECTAL: Patient refusing rectal exam at this time as he had one in the ED     ASSESSMENT/PLAN:  78 y.o. male with CHF exacerbation     Monitor H/H, transfuse as needed per primary   Appreciate care per primary regarding CHF exacerbation   Given drastic reduction in Hb from his baseline we will plan to scope this visit once CHF has been controlled. Protonix   Ok for [employer]     Plan discussed with Dr. Kermit Sandifer.     Gallito Eric MD  Surgery Resident PGY-1  2022  7:35 PM

## 2022-05-02 NOTE — CONSULTS
GENERAL SURGERY  CONSULT NOTE  5/2/2022    HPI  Robert Sewell is a 78 y.o. male with hx of CHF (EF 60% 2016), COPD, AAA >39 mm, colon polyps, and GERD who presents for evaluation of worsening SOB and peripheral edema. Patient has had complaint of dark tarry stools recently as well. He states that three days ago he had one episode of \"darker than normal stool\" but has had normal brown bowel movements since. He denies abdominal pain, weight loss, nausea, vomiting, bloody stools, reflux symptoms. EGD 12/2019 showed severe gastritis with healing antral ulcers   Colonoscopy 12/2019 showed polyps as well as diverticulosis     Afebrile, hemodynamically stable   Hb 7.3 on admission (baseline: 14-15)  BNP 2562     Past Medical History:   Diagnosis Date    Abdominal aortic aneurysm (AAA) >39 mm diameter (HCC)     Anemia     Cardiomyopathy ([address]     Carpal tunnel syndrome     CHF (congestive heart failure) (HCC)     echo 3/5/16 ef 82%, stage 1 diastolic    COPD (chronic obstructive pulmonary disease) (HCC)     GERD (gastroesophageal reflux disease)     Hx of colonic polyps     Hypertension     Hypothyroidism     Rectal bleeding     Respiratory neoplasm     Sleep apnea     Tobacco use        Past Surgical History:   Procedure Laterality Date    ABDOMEN SURGERY      CARDIAC CATHETERIZATION      CHOLECYSTECTOMY      COLONOSCOPY      COLONOSCOPY N/A 12/9/2018    COLONOSCOPY DIAGNOSTIC performed by Umair Marin MD at [address] [place] [address], ESOPHAGUS      ENDOSCOPY, COLON, DIAGNOSTIC      FRACTURE SURGERY      JOINT REPLACEMENT      SALIVARY GLAND SURGERY      UPPER GASTROINTESTINAL ENDOSCOPY N/A 12/9/2018    EGD DIAGNOSTIC ONLY performed by Umair Marin MD at [facility]. [address]         Medications Prior to Admission    Prior to Admission medications    Medication Sig Start Date End Date Taking?  Authorizing Provider   fluticasone (FLONASE) 50 MCG/ACT nasal spray 2 sprays by Nasal route daily   Yes Historical Provider, MD   levothyroxine (SYNTHROID) 50 MCG tablet Take 50 mcg by mouth Daily Given with Levothyroxine 200 mcg total dose Levothyroxine 250 mcg   Yes Historical Provider, MD   levothyroxine (SYNTHROID) 200 MCG tablet Take 200 mcg by mouth Daily Given with Levothyroxine 50 mcg total dose Levothyroxine 250 mcg   Yes Historical Provider, MD   metoprolol succinate (TOPROL XL) 50 MG extended release tablet Take 50 mg by mouth daily   Yes Historical Provider, MD   ketotifen (ZADITOR) 0.025 % ophthalmic solution Place 1 drop into both eyes 2 times daily as needed   Yes Historical Provider, MD   albuterol sulfate HFA (VENTOLIN HFA) 108 (90 Base) MCG/ACT inhaler Inhale 1 puff into the lungs every 6 hours as needed for Wheezing   Yes Historical Provider, MD   terazosin (HYTRIN) 2 MG capsule Take 4 mg by mouth nightly   Yes Historical Provider, MD   Budeson-Glycopyrrol-Formoterol (BREZTRI AEROSPHERE) 160-9-4.8 MCG/ACT AERO Inhale 2 puffs into the lungs 2 times daily   Yes Historical Provider, MD   albuterol (PROVENTIL) (2.5 MG/3ML) 0.083% nebulizer solution Take 2.5 mg by nebulization every 6 hours as needed for Wheezing   Yes Historical Provider, MD   sodium chloride (OCEAN) 0.65 % nasal spray 1 spray by Nasal route daily   Yes Historical Provider, MD   OXYGEN Inhale 4 L into the lungs continuous   Yes Historical Provider, MD   torsemide (DEMADEX) 20 MG tablet Take 40 mg by mouth 2 times daily    Historical Provider, MD   rivaroxaban (XARELTO) 15 MG TABS tablet Take 15 mg by mouth Daily with supper     Historical Provider, MD   vitamin D 25 MCG (1000 UT) CAPS Take 1,000 Units by mouth daily     Historical Provider, MD   sucralfate (CARAFATE) 1 GM tablet Take 1 tablet by mouth three times daily 12/13/18 1/12/19  Talib Sainz MD       Allergies   Allergen Reactions    Amlodipine Swelling    Isosorbide Mononitrate [Isosorbide Nitrate] Other (See Comments)     Migraine headaches    Lisinopril Rash     cough       No family history on file. Social History     Tobacco Use    Smoking status: Former Smoker     Quit date: 2000     Years since quittin.3    Smokeless tobacco: Never Used   Substance Use Topics    Alcohol use: Yes     Alcohol/week: 21.0 standard drinks     Types: 21 Shots of liquor per week     Comment: hasn't drank in three weeks    Drug use: Not on file         Review of Systems: pertinent ROS listed in HPI, all others negative       PHYSICAL EXAM:    Vitals:    22 1430   BP: 124/81   Pulse: 91   Resp: 20   Temp:    SpO2: 100%       GENERAL:  NAD. A&Ox3. HEAD:  Normocephalic. Atraumatic. EYES:   No scleral icterus. PERRL. LUNGS:  No increased work of breathing. CARDIOVASCULAR: RR  ABDOMEN:  Soft, non-distended, non-tender. No guarding, rigidity, rebound. EXTREMITIES:   MAEx4. Atraumatic. No LE edema. SKIN:  Warm and dry  NEUROLOGIC:  GCS   RECTAL: Patient refusing rectal exam as it was \"done in the ED and was negative\"      ASSESSMENT/PLAN:  78 y.o. male admitted for CHF exacerbation with acute anemia Hb 7.3 from baseline 14-15    - No immediate evidence of active GI bleeding, however     Plan discussed with Dr. Marcio Senior. Karen Leon MD  Surgery Resident PGY-1  2022  3:40 PM    Attending Supervising Physician's Attestation Statement  I performed a history and physical examination on the patient and discussed the management with the resident physician. I reviewed and agree with the findings and plan as documented in her note . Significant drop in hemoglobin. Patient was admitted for CHF exacerbation. Likely also has some symptomatic anemia. Patient may or may not be willing to have an EGD while he is here. He claims he did not have bloody stools and it was only something he ate. He refused general surgery team from having a rectal exam done as he states it was done in the ER.     Would plan to do an EGD prior to discharge or in short follow-up as an outpatient.     Electronically signed by Izabel Green MD on 5/2/22 at 11:37 PM EDT

## 2022-05-02 NOTE — ED NOTES
Report called to [facility].   ER RN to transport patient w RT     Monica Goodson RN  05/02/22 [id number]

## 2022-05-02 NOTE — ED PROVIDER NOTES
[address]  Department of Emergency Medicine     Written by: Brody Dumont DO  Patient Name: Stacia Favio  Attending Provider: Karan Ruffin MD  Admit Date: 2022 10:30 AM  MRN: [id number]                   : 1945        Chief Complaint   Patient presents with    Leg Swelling     Started 3 weeks ago with SOB.  Testicle Swelling     Started 3 weeks ago. - Chief complaint    Mr. Ramón Hope is a 77 yo male who presents to the ED due to shortness of breath, leg and testicular swelling. States that over the past 3 weeks he has had worsening shortness of breath and peripheral edema. States that over the past week he has also had worsening testicular edema as well. He notes that he has been exceedingly thirsty over the last month and feels he has been drinking more water than he typically does. Patient notes that he does have a history of CHF and is currently taking 40 mg of torsemide twice daily. Patient states that he was additionally on 1 to 2 L of oxygen and as needed but over the past month he has required constant oxygen via nasal cannula at 3 to 4 L. States his symptoms have been constant and progressively worsening since onset. States his symptoms are aggravated with any sort of exertion and only minimally relieved with rest.  Of note, patient states that he has also had intermittent dark tarry stools over the past week. He denies any abdominal pain associated with the bowel movements. He states that he has also had some difficulty urinating over the past week as well stating he feels as though he does not empty his bladder. Patient denies any fever, chills, nausea, vomiting, chest pain, abdominal pain, headaches or vision changes. Review of Systems   Constitutional: Negative for chills, fatigue and fever. HENT: Negative for congestion, sinus pressure, sinus pain and sore throat. Eyes: Negative for pain, redness and visual disturbance.    Respiratory: Positive for shortness of breath. Negative for cough and chest tightness. Cardiovascular: Positive for leg swelling. Negative for chest pain and palpitations. Gastrointestinal: Positive for blood in stool (Dark, tarry stools intermittently over past week). Negative for abdominal pain, constipation, diarrhea, nausea and vomiting. Genitourinary: Positive for scrotal swelling. Negative for dysuria, flank pain, frequency, hematuria and urgency. Musculoskeletal: Negative for arthralgias, back pain, gait problem, joint swelling and myalgias. Skin: Negative for rash. Neurological: Negative for dizziness, tremors, weakness, light-headedness, numbness and headaches. Physical Exam  Exam conducted with a chaperone present. Constitutional:       General: He is not in acute distress. Appearance: Normal appearance. He is normal weight. He is not ill-appearing. HENT:      Head: Normocephalic and atraumatic. Right Ear: External ear normal.      Left Ear: External ear normal.      Mouth/Throat:      Mouth: Mucous membranes are moist.      Pharynx: Oropharynx is clear. Eyes:      Extraocular Movements: Extraocular movements intact. Conjunctiva/sclera: Conjunctivae normal.   Cardiovascular:      Rate and Rhythm: Normal rate and regular rhythm. Pulses: Normal pulses. Heart sounds: Normal heart sounds. Pulmonary:      Effort: Pulmonary effort is normal. No respiratory distress. Breath sounds: Normal breath sounds. Abdominal:      General: Abdomen is flat. Bowel sounds are normal.      Palpations: Abdomen is soft. Tenderness: There is no abdominal tenderness. There is no guarding or rebound. Genitourinary:     Testes:         Right: Swelling present. Left: Swelling present. Rectum: Guaiac result negative. Comments: Diffuse testicular edema and testicular soreness  Musculoskeletal:         General: Signs of injury present.  No swelling, tenderness or deformity. Normal range of motion. Cervical back: Normal range of motion and neck supple. No rigidity or tenderness. Right lower leg: Edema (3+) present. Left lower leg: Edema (3+) present. Skin:     General: Skin is warm and dry. Neurological:      General: No focal deficit present. Mental Status: He is alert and oriented to person, place, and time. Mental status is at baseline. Cranial Nerves: No cranial nerve deficit. Sensory: No sensory deficit. Motor: No weakness. Psychiatric:         Mood and Affect: Mood normal.         Behavior: Behavior normal.          Procedures       MDM  Number of Diagnoses or Management Options  Acute congestive heart failure, unspecified heart failure type [facility])  Diagnosis management comments: This is a 79 yo male who presents to the ED due to shortness of breath, leg and testicular swelling. Patient CBC revealed acute anemia with a hemoglobin of 7.3. BMP revealed elevated bicarb of 43 with a creatinine of 1.6 with appears to be within the patient's normal limits. Patient BNP was mildly elevated from his baseline of 2562. Initial troponin was 69 with a delta of 3 and a repeat of 72. Patient's ABG revealed pH of 7.361 with a PCO2 of 73.1, PO2 of 80.8 and an O2 sat of 84.5. Patient was placed on BiPAP for his hypercapnia. Chest x-ray revealed suspected early CHF. Patient's acute drop in hemoglobin and since history where he stated that he has had dark tarry stools he was given an 80 mg bolus of Protonix. He was also given a 60 mg injection of Lasix for his worsening CHF.   Patient will be admitted to the hospital at this time for further work-up and treatment by Dr. Gisela Almazan.            --------------------------------------------- PAST HISTORY ---------------------------------------------  Past Medical History:  has a past medical history of Abdominal aortic aneurysm (AAA) >39 mm diameter ([facility] Anemia, Cardiomyopathy ([facility] Carpal tunnel syndrome, CHF (congestive heart failure) ([facility] COPD [facility] disease) [facility] GERD (gastroesophageal reflux disease), Hx of colonic polyps, Hypertension, Hypothyroidism, Rectal bleeding, Respiratory neoplasm, Sleep apnea, and Tobacco use. Past Surgical History:  has a past surgical history that includes Abdomen surgery; Colonoscopy; Salivary gland surgery; Cholecystectomy; Endoscopy, colon, diagnostic; Dilatation, esophagus; fracture surgery; Cardiac catheterization; joint replacement; vascular surgery; Upper gastrointestinal endoscopy (N/A, 12/9/2018); and Colonoscopy (N/A, 12/9/2018). Social History:  reports that he quit smoking about 22 years ago. He has never used smokeless tobacco. He reports current alcohol use of about 21.0 standard drinks of alcohol per week. Family History: family history is not on file. The patients home medications have been reviewed.     Allergies: Amlodipine, Isosorbide mononitrate [isosorbide nitrate], and Lisinopril    -------------------------------------------------- RESULTS -------------------------------------------------    LABS:  Results for orders placed or performed during the hospital encounter of 05/02/22   CBC with Auto Differential   Result Value Ref Range    WBC 5.9 4.5 - 11.5 E9/L    RBC 4.12 3.80 - 5.80 E12/L    Hemoglobin 7.3 (L) 12.5 - 16.5 g/dL    Hematocrit 30.1 (L) 37.0 - 54.0 %    MCV 73.1 (L) 80.0 - 99.9 fL    MCH 17.7 (L) 26.0 - 35.0 pg    MCHC 24.3 (L) 32.0 - 34.5 %    RDW 25.2 (H) 11.5 - 15.0 fL    Platelets 500 598 - 771 E9/L    MPV 9.8 7.0 - 12.0 fL    Neutrophils % 72.4 43.0 - 80.0 %    Immature Granulocytes % 0.3 0.0 - 5.0 %    Lymphocytes % 12.4 (L) 20.0 - 42.0 %    Monocytes % 11.2 2.0 - 12.0 %    Eosinophils % 3.2 0.0 - 6.0 %    Basophils % 0.5 0.0 - 2.0 %    Neutrophils Absolute 4.25 1.80 - 7.30 E9/L    Immature Granulocytes # 0.02 E9/L    Lymphocytes Absolute 0.73 (L) 1.50 - 4.00 E9/L    Monocytes Absolute 0.66 0.10 - 0.95 E9/L    Eosinophils Absolute 0.19 0.05 - 0.50 E9/L    Basophils Absolute 0.03 0.00 - 0.20 E9/L    Anisocytosis 2+     Polychromasia 2+     Hypochromia 2+     Poikilocytes 1+     Ovalocytes 1+     Tear Drop Cells 1+    Basic Metabolic Panel w/ Reflex to MG   Result Value Ref Range    Sodium 143 132 - 146 mmol/L    Potassium reflex Magnesium 4.0 3.5 - 5.0 mmol/L    Chloride 97 (L) 98 - 107 mmol/L    CO2 43 (HH) 22 - 29 mmol/L    Anion Gap 3 (L) 7 - 16 mmol/L    Glucose 90 74 - 99 mg/dL    BUN 24 (H) 6 - 23 mg/dL    CREATININE 1.6 (H) 0.7 - 1.2 mg/dL    GFR Non-African American 42 >=60 mL/min/1.73    GFR African American 51     Calcium 9.1 8.6 - 10.2 mg/dL   Troponin   Result Value Ref Range    Troponin, High Sensitivity 69 (H) 0 - 11 ng/L   Brain Natriuretic Peptide   Result Value Ref Range    Pro-BNP 2,562 (H) 0 - 450 pg/mL   Troponin   Result Value Ref Range    Troponin, High Sensitivity 72 (H) 0 - 11 ng/L   Blood Gas, Arterial   Result Value Ref Range    Date Analyzed 20220502     Time Analyzed 1240     Source: Blood Arterial     pH, Blood Gas 7.361 7.350 - 7.450    PCO2 73.1 (HH) 35.0 - 45.0 mmHg    PO2 80.8 75.0 - 100.0 mmHg    HCO3 40.5 (H) 22.0 - 26.0 mmol/L    B.E. 13.2 (H) -3.0 - 3.0 mmol/L    O2 Sat 94.5 92.0 - 98.5 %    O2Hb 92.5 (L) 94.0 - 97.0 %    COHb 1.7 (H) 0.0 - 1.5 %    MetHb 0.4 0.0 - 1.5 %    O2 Content 10.8 mL/dL    HHb 5.4 (H) 0.0 - 5.0 %    tHb (est) 8.2 (L) 11.5 - 16.5 g/dL    Mode NC- 3 L     Date Of Collection      Time Collected      Pt Temp 37.0 C     ID [id number]     Lab 05969     Critical(s) Notified Handed report to Dr/RN    EKG 12 Lead   Result Value Ref Range    Ventricular Rate 90 BPM    Atrial Rate 80 BPM    QRS Duration 108 ms    Q-T Interval 408 ms    QTc Calculation (Bazett) 499 ms    R Axis 77 degrees    T Axis -78 degrees       RADIOLOGY:  XR CHEST PORTABLE   Final Result   Suspect early CHF. EKG:  This EKG is signed and interpreted by me.     Rate: 90  Rhythm: Atrial fibrillation  Interpretation: no acute changes  Comparison: stable as compared to patient's most recent EKG      ------------------------- NURSING NOTES AND VITALS REVIEWED ---------------------------  Date / Time Roomed:  5/2/2022 10:30 AM  ED Bed Assignment:  16/16    The nursing notes within the ED encounter and vital signs as below have been reviewed. Patient Vitals for the past 24 hrs:   BP Temp Temp src Pulse Resp SpO2 Weight   05/02/22 1200 -- -- -- 94 24 -- --   05/02/22 1115 -- -- -- 100 19 -- --   05/02/22 1031 126/77 97.7 °F (36.5 °C) Oral 82 18 100 % 290 lb (131.5 kg)       Oxygen Saturation Interpretation: Normal on his home O2    ------------------------------------------ PROGRESS NOTES ------------------------------------------  Re-evaluation(s):  Time: 8963  Patients symptoms show no change  Repeat physical examination is not changed    Counseling:  I have spoken with the patient and discussed todays results, in addition to providing specific details for the plan of care and counseling regarding the diagnosis and prognosis. Their questions are answered at this time and they are agreeable with the plan of admission.    --------------------------------- ADDITIONAL PROVIDER NOTES ---------------------------------  Consultations:  Time: 5140. Spoke with Dr. Vickie Lehman. Discussed case. They will admit the patient. This patient's ED course included: a personal history and physicial examination, re-evaluation prior to disposition, multiple bedside re-evaluations, IV medications, cardiac monitoring and continuous pulse oximetry    This patient has remained hemodynamically stable during their ED course. Diagnosis:  1. Acute congestive heart failure, unspecified heart failure type ([facility]        Disposition:  Patient's disposition: Admit to telemetry  Patient's condition is stable.         Patient was seen and evaluated by myself and my attending Aury Ramon MD. Assessment and Plan discussed with attending provider, please see attestation for final plan of care.      DO Gil Ramos DO  Resident  05/02/22 1986

## 2022-05-02 NOTE — ED NOTES
Stool negative for occult blood per ER Resident. Pt. States he has had dark black stools in past && is on Xarelto for A-Fib.      Gautam Chapman RN  05/02/22 2741

## 2022-05-02 NOTE — H&P
Hospitalist History & Physical      PCP: Radha Fuentes DO    Date of Service: Pt seen/examined on 5/2/2022     admitted to Inpatient with expected LOS greater than two midnights due to medical therapy. Chief Complaint:  had concerns including Leg Swelling (Started 3 weeks ago with SOB. ) and Testicle Swelling (Started 3 weeks ago. ). History of Present Illness:    Mr. Carlton Hyde, a 78y.o. year old male  who  has a past medical history of Abdominal aortic aneurysm (AAA) >39 mm diameter (HCC), Anemia, Cardiomyopathy ([address] Carpal tunnel syndrome, CHF (congestive heart failure) ([address] COPD (chronic obstructive pulmonary disease) ([address] GERD (gastroesophageal reflux disease), Hx of colonic polyps, Hypertension, Hypothyroidism, Rectal bleeding, Respiratory neoplasm, Sleep apnea, and Tobacco use. Presents to ER secondary to increased shortness of breath that he is experienced over the course of the last month. Patient lives alone and independently. Patient admits to chronic oxygen use and states he follows a pulmonologist and cardiologist at the [place]. patient denies any chest pain, fever, chills, nausea, vomiting, abdominal pain associated with the shortness of breath. It is chronic nature, worsened by activity, relieved only minimally by rest.  Patient also presents with bilateral 3+ pitting edema to lower extremities and scrotum, that he says has gotten worse over the last month. Patient also states that he has been experiencing dark tarry stools. Patient also presents with A. fib, and denies any history of having it, but takes Xarelto. ER COURSE:  ER course includes BMP revealed elevated BUN of 24 and creatinine of 1.6 it seems to be patient's baseline. Elevated proBNP at 2562, elevated troponin, initially 69 elevated to 72. CBC shows a hemoglobin of 7.3, hemoglobin on a previous admission 8/13/2021 shows a hemoglobin of 14.3.   Blood gases were ordered, and showed pH of 7.361, CO2 73.1 bicarb 40.5, compensated respiratory acidosis. Patient was placed on BiPAP. A chest x-ray was completed and shows that the heart was enlarged. Pulmonary vessels are congested, mild opacity at the lung bases. Suspect early CHF. Past Medical History:        Diagnosis Date    Abdominal aortic aneurysm (AAA) >39 mm diameter (HCC)     Anemia     Cardiomyopathy (HCC)     Carpal tunnel syndrome     CHF (congestive heart failure) (HCC)     echo 3/5/16 ef 05%, stage 1 diastolic    COPD (chronic obstructive pulmonary disease) (HCC)     GERD (gastroesophageal reflux disease)     Hx of colonic polyps     Hypertension     Hypothyroidism     Rectal bleeding     Respiratory neoplasm     Sleep apnea     Tobacco use        Past Surgical History:        Procedure Laterality Date    ABDOMEN SURGERY      CARDIAC CATHETERIZATION      CHOLECYSTECTOMY      COLONOSCOPY      COLONOSCOPY N/A 12/9/2018    COLONOSCOPY DIAGNOSTIC performed by Alfredo Leroy MD at [address] [place] [address], ESOPHAGUS      ENDOSCOPY, COLON, DIAGNOSTIC      FRACTURE SURGERY      JOINT REPLACEMENT      SALIVARY GLAND SURGERY      UPPER GASTROINTESTINAL ENDOSCOPY N/A 12/9/2018    EGD DIAGNOSTIC ONLY performed by Alfredo Leroy MD at [facility]. [address]         Medications Prior to Admission:      Prior to Admission medications    Medication Sig Start Date End Date Taking?  Authorizing Provider   fluticasone (FLONASE) 50 MCG/ACT nasal spray 2 sprays by Nasal route daily   Yes Historical Provider, MD   levothyroxine (SYNTHROID) 50 MCG tablet Take 50 mcg by mouth Daily Given with Levothyroxine 200 mcg total dose Levothyroxine 250 mcg   Yes Historical Provider, MD   levothyroxine (SYNTHROID) 200 MCG tablet Take 200 mcg by mouth Daily Given with Levothyroxine 50 mcg total dose Levothyroxine 250 mcg   Yes Historical Provider, MD   metoprolol succinate (TOPROL XL) 50 MG extended release tablet Take 50 mg by mouth daily   Yes Historical Provider, MD   ketotifen (ZADITOR) 0.025 % ophthalmic solution Place 1 drop into both eyes 2 times daily as needed   Yes Historical Provider, MD   albuterol sulfate HFA (VENTOLIN HFA) 108 (90 Base) MCG/ACT inhaler Inhale 1 puff into the lungs every 6 hours as needed for Wheezing   Yes Historical Provider, MD   terazosin (HYTRIN) 2 MG capsule Take 4 mg by mouth nightly   Yes Historical Provider, MD   Budeson-Glycopyrrol-Formoterol (BREZTRI AEROSPHERE) 160-9-4.8 MCG/ACT AERO Inhale 2 puffs into the lungs 2 times daily   Yes Historical Provider, MD   albuterol (PROVENTIL) (2.5 MG/3ML) 0.083% nebulizer solution Take 2.5 mg by nebulization every 6 hours as needed for Wheezing   Yes Historical Provider, MD   sodium chloride (OCEAN) 0.65 % nasal spray 1 spray by Nasal route daily   Yes Historical Provider, MD   OXYGEN Inhale 4 L into the lungs continuous   Yes Historical Provider, MD   torsemide (DEMADEX) 20 MG tablet Take 40 mg by mouth 2 times daily    Historical Provider, MD   rivaroxaban (XARELTO) 15 MG TABS tablet Take 15 mg by mouth Daily with supper     Historical Provider, MD   vitamin D 25 MCG (1000 UT) CAPS Take 1,000 Units by mouth daily     Historical Provider, MD   sucralfate (CARAFATE) 1 GM tablet Take 1 tablet by mouth three times daily 12/13/18 1/12/19  Guzman Young MD       Allergies:  Amlodipine, Isosorbide mononitrate [isosorbide nitrate], and Lisinopril    Social History:    RESIDENCE: home  TOBACCO:   reports that he quit smoking about 22 years ago. He has never used smokeless tobacco.  ETOH:   reports current alcohol use of about 21.0 standard drinks of alcohol per week. Family History:    Reviewed in detail and negative for DM, CAD, Cancer, CVA. Positive as follows\"  No family history on file. Review of Systems:   All bolded are positive; please see HPI  General:  Fever, chills, diaphoresis, fatigue, malaise, night sweats, weight loss  Psychological:  Anxiety, disorientation, hallucinations. ENT:  Epistaxis, headaches, vertigo, visual changes. Cardiovascular:  Chest pain, irregular heartbeats, palpitations, paroxysmal nocturnal dyspnea. Respiratory:  Shortness of breath, coughing, sputum production, hemoptysis, wheezing, orthopnea. Gastrointestinal:  Nausea, vomiting, diarrhea, heartburn, constipation, abdominal pain, hematemesis, hematochezia, melena, acholic stools  Genito-Urinary:  Dysuria, urgency, frequency, hematuria  Musculoskeletal:  Joint pain, joint stiffness, joint swelling, muscle pain  Neurology:  Headache, focal neurological deficits, weakness, numbness, paresthesia  Derm:  Rashes, ulcers, excoriations, bruising  Extremities:  Decreased ROM, peripheral edema, mottling    Physical Exam:  /77   Pulse 94   Temp 97.7 °F (36.5 °C) (Oral)   Resp 24   Wt 290 lb (131.5 kg)   SpO2 100%   BMI 39.33 kg/m²   General appearance: Elderly adult male, visibly uncomfortable but in no apparent acute distress, appears stated age and cooperative. Respiratory:  Clear to auscultation bilaterally. Slightly tachypneic with labored respiratory effort. Cardiovascular:  IRR. S1, S2 without MRG. PV: Pulses palpable. 3+ pitting edema in bilateral lower extremities, scrotal edema. Abdomen: Soft, non-tender, non-distended. +BS distant  Musculoskeletal: No obvious deformities. Skin: Normal skin color. No rashes or lesions. Good turgor. Neurologic:  Grossly non-focal. Awake, alert, following commands.    Psychiatric: Alert and oriented, thought content appropriate, normal insight and judgement    Reviewed EKG and CXR personally      CBC:   Recent Labs     05/02/22  1058   WBC 5.9   RBC 4.12   HGB 7.3*   HCT 30.1*   MCV 73.1*   RDW 25.2*        BMP:   Recent Labs     05/02/22  1058      K 4.0   CL 97*   CO2 43*   BUN 24*   CREATININE 1.6*     LFT:  No results for input(s): PROT, ALB, ALKPHOS, ALT, AST, BILITOT, AMYLASE, LIPASE in the last 72 hours.  CE:  No results for input(s): Tammi Pace in the last 72 hours. PT/INR: No results for input(s): INR, APTT in the last 72 hours. BNP: No results for input(s): BNP in the last 72 hours. ESR: No results found for: SEDRATE  CRP: No results found for: CRP  D Dimer: No results found for: DDIMER   Folate and B12:   Lab Results   Component Value Date    ZCBXURXJ42 511 12/08/2018   ,   Lab Results   Component Value Date    FOLATE 10.8 12/08/2018     Lactic Acid:   Lab Results   Component Value Date    LACTA 1.2 12/07/2018     Thyroid Studies:   Lab Results   Component Value Date    TSH 1.090 06/19/2020       Oupatient labs:  Lab Results   Component Value Date    CHOL 116 03/05/2016    TRIG 51 03/05/2016    HDL 51 03/05/2016    LDLCALC 55 03/05/2016    TSH 1.090 06/19/2020    INR 1.3 12/07/2018    LABA1C 6.3 (H) 03/05/2016       Urinalysis:    Lab Results   Component Value Date    NITRU Negative 12/07/2018    WBCUA NONE 03/04/2016    BACTERIA NONE 03/04/2016    RBCUA NONE 03/04/2016    BLOODU Negative 12/07/2018    SPECGRAV 1.010 12/07/2018    GLUCOSEU Negative 12/07/2018       Imaging:  XR CHEST PORTABLE    Result Date: 5/2/2022  Suspect early CHF.          Assessment:  · Decompensated heart failure  · CHF, last known EF 55% on echocardiogram on 6/19/2020  · Acute gastrointestinal bleed with associated blood loss anemia   · COPD  · GERD  · Hypertension  · Hypothyroidism  · History of sleep apnea    Plan:  · BNP 2562 on presentation, follow proBNP  · Last known EF 55% on echocardiogram on 6-19-20  · BiPAP/nasal cannula O2  · Echocardiogram   · Good panel  · Daily weights  · Strict intake and output  · IV Lasix  · Monitor electrolytes, especially K  · Check albumin and replete as indicated  · Urinalysis  · Consult cardiology  · Sodium diet  · Hgb 7.3 on presentation, admission in 8- showed a hemoglobin of 14.3  · Iron and TIBC  · H&H q6 for now  · Check PT/INR  · Type & screen complete  · Transfuse as necessary (hgb <7)  · Protonix bolus and infusion  · Consult general surgery  · Hold Xarelto  · Consult heart failure nurse  · Continue home medications Synthroid 200 mcg, Toprol 50 mg        Diet: No diet orders on file  Code Status: Prior  Surrogate decision maker confirmed with patient:   Extended Emergency Contact Information  Primary Emergency Contact: [address] Phone: [phone]  Relation: Brother/Sister    DVT Prophylaxis: []Lovenox []Heparin [x]PCD [] [address] []Encouraged ambulation  Disposition: []Med/Surg [x] Intermediate [] ICU/CCU  Admit status: [] Observation [x] Inpatient     +++++++++++++++++++++++++++++++++++++++++++++++++  ZULLY Box/ [address]  +++++++++++++++++++++++++++++++++++++++++++++++++  NOTE: This report was transcribed using voice recognition software. Every effort was made to ensure accuracy; however, inadvertent computerized transcription errors may be present.

## 2022-05-02 NOTE — ED PROVIDER NOTES
ATTENDING PROVIDER ATTESTATION:     Adalgisa Mendez presented to the emergency department for evaluation of Leg Swelling (Started 3 weeks ago with SOB. ) and Testicle Swelling (Started 3 weeks ago. )   and was initially evaluated by the Medical Resident. See Original ED Note for H&P and ED course above. I have reviewed and discussed the case, including pertinent history (medical, surgical, family and social) and exam findings with the Medical Resident assigned to Adalgisa Robeson.  I have personally performed and/or participated in the history, exam, medical decision making, and procedures and agree with all pertinent clinical information and any additional changes or corrections are noted below in my assessment and plan. I have discussed this patient in detail with the resident, and provided the instruction and education,       I have reviewed my findings and recommendations with the assigned Medical Resident, Adalgisa Mendez and members of family present at the time of disposition. I have performed a history and physical examination of this patient and directly supervised the resident caring for this patient      History of Present Illness:    Presents to the ED for peripheral edema and shortness of breath, beginning 3 weeks ago. The complaint has been constant, severe in severity, and worsened by nothing. Worsening peripheral edema for the last 3 weeks. Scrotal and leg edema. Says he is also short of breath with orthopnea. No fever or chills. No chest pain.  He is anticoagulated with Xarelto        Review of Systems:   A complete review of systems was performed and pertinent positives and negatives are stated within HPI, all other systems reviewed and are negative.    --------------------------------------------- PAST HISTORY ---------------------------------------------  Past Medical History:  has a past medical history of Abdominal aortic aneurysm (AAA) >39 mm diameter ([facility] Anemia, Cardiomyopathy ([facility] Carpal tunnel syndrome, CHF (congestive heart failure) ([facility] COPD (chronic obstructive pulmonary disease) ([facility] GERD (gastroesophageal reflux disease), Hx of colonic polyps, Hypertension, Hypothyroidism, Rectal bleeding, Respiratory neoplasm, Sleep apnea, and Tobacco use. Past Surgical History:  has a past surgical history that includes Abdomen surgery; Colonoscopy; Salivary gland surgery; Cholecystectomy; Endoscopy, colon, diagnostic; Dilatation, esophagus; fracture surgery; Cardiac catheterization; joint replacement; vascular surgery; Upper gastrointestinal endoscopy (N/A, 12/9/2018); and Colonoscopy (N/A, 12/9/2018). Social History:  reports that he quit smoking about 22 years ago. He has never used smokeless tobacco. He reports current alcohol use of about 21.0 standard drinks of alcohol per week. Family History: family history is not on file. Unless otherwise noted, family history is non contributory    The patients home medications have been reviewed. Allergies: Amlodipine, Isosorbide mononitrate [isosorbide nitrate], and Lisinopril       EKG Interpretation  Interpreted by emergency department physician, Dr. Letty Castro     5/2/22  Time: 4400    Rhythm: atrial fibrillation - controlled  Rate: normal  Axis: normal  Conduction: normal  ST Segments: no acute change  T Waves: no acute change    Clinical Impression: afib, chronic, no acute ischemic changes  Comparison to Old EKG  Stable from prior        Physical Exam:  Constitutional/General: Alert and oriented x3  Head: Normocephalic and atraumatic  Eyes: PERRL, EOMI, sclera non icteric  ENT: Oropharynx clear, handling secretions  Neck: Supple, full ROM, no stridor, no meningeal signs  Respiratory: Lungs with decreased BS bilaterally and rales bilaterally. Not in respiratory distress  Cardiovascular:  Irregularly irregular, No murmurs, no gallops, no rubs. 2+ distal pulses. Equal extremity pulses. GI:  Abdomen Soft, Non tender, Non distended.  No rebound, guarding, or rigidity. No pulsatile masses. Significant scrotal edema. No redness or erythema or cellulitis. Scrotum is non tender. Musculoskeletal: Moves all extremities x 4. Warm and well perfused,  no clubbing, no cyanosis. 2+ pitting edema bilaterally. Palpable peripheral pulses  Integument: skin warm and dry. No rashes. Neurologic: GCS 15, no focal deficits  Psychiatric: Normal Affect      I directly supervised any procedures performed by the resident and was present for the procedure including all critical portions of the procedure      The cardiac monitor revealed atib with a heart rate in the 90s as interpreted by me. The cardiac monitor was ordered secondary to the patient's shortness of breath and to monitor the patient for dysrhythmia. CPT [id number]      I, Dr. Srinivas Cartwright, am the primary provider of record    My Medical Decision Making:         Decompensated heart failure  Needs admission          1.  Acute congestive heart failure, unspecified heart failure type [facility])              Lynette Malcolm MD  05/02/22 2030

## 2022-05-03 LAB
ANION GAP SERPL CALCULATED.3IONS-SCNC: 6 MMOL/L (ref 7–16)
ANISOCYTOSIS: ABNORMAL
BASOPHILS ABSOLUTE: 0.06 E9/L (ref 0–0.2)
BASOPHILS RELATIVE PERCENT: 0.9 % (ref 0–2)
BLOOD BANK DISPENSE STATUS: NORMAL
BLOOD BANK PRODUCT CODE: NORMAL
BPU ID: NORMAL
BUN BLDV-MCNC: 21 MG/DL (ref 6–23)
CALCIUM SERPL-MCNC: 8.7 MG/DL (ref 8.6–10.2)
CHLORIDE BLD-SCNC: 96 MMOL/L (ref 98–107)
CHOLESTEROL, TOTAL: 91 MG/DL (ref 0–199)
CO2: 43 MMOL/L (ref 22–29)
CREAT SERPL-MCNC: 1.7 MG/DL (ref 0.7–1.2)
DESCRIPTION BLOOD BANK: NORMAL
EOSINOPHILS ABSOLUTE: 0.06 E9/L (ref 0.05–0.5)
EOSINOPHILS RELATIVE PERCENT: 0.9 % (ref 0–6)
GFR AFRICAN AMERICAN: 48
GFR NON-AFRICAN AMERICAN: 39 ML/MIN/1.73
GLUCOSE BLD-MCNC: 82 MG/DL (ref 74–99)
HCT VFR BLD CALC: 27.4 % (ref 37–54)
HCT VFR BLD CALC: 29.9 % (ref 37–54)
HCT VFR BLD CALC: 30.1 % (ref 37–54)
HDLC SERPL-MCNC: 37 MG/DL
HEMOGLOBIN: 6.7 G/DL (ref 12.5–16.5)
HEMOGLOBIN: 7.4 G/DL (ref 12.5–16.5)
HEMOGLOBIN: 7.6 G/DL (ref 12.5–16.5)
HYPOCHROMIA: ABNORMAL
LDL CHOLESTEROL CALCULATED: 44 MG/DL (ref 0–99)
LV EF: 63 %
LVEF MODALITY: NORMAL
LYMPHOCYTES ABSOLUTE: 0.73 E9/L (ref 1.5–4)
LYMPHOCYTES RELATIVE PERCENT: 11.3 % (ref 20–42)
MAGNESIUM: 2.2 MG/DL (ref 1.6–2.6)
MCH RBC QN AUTO: 18 PG (ref 26–35)
MCHC RBC AUTO-ENTMCNC: 24.5 % (ref 32–34.5)
MCV RBC AUTO: 73.7 FL (ref 80–99.9)
MONOCYTES ABSOLUTE: 0.26 E9/L (ref 0.1–0.95)
MONOCYTES RELATIVE PERCENT: 4.3 % (ref 2–12)
NEUTROPHILS ABSOLUTE: 5.48 E9/L (ref 1.8–7.3)
NEUTROPHILS RELATIVE PERCENT: 82.6 % (ref 43–80)
OVALOCYTES: ABNORMAL
PDW BLD-RTO: 25.1 FL (ref 11.5–15)
PLATELET # BLD: 248 E9/L (ref 130–450)
PMV BLD AUTO: 10.1 FL (ref 7–12)
POIKILOCYTES: ABNORMAL
POLYCHROMASIA: ABNORMAL
POTASSIUM SERPL-SCNC: 4 MMOL/L (ref 3.5–5)
RBC # BLD: 3.72 E12/L (ref 3.8–5.8)
SODIUM BLD-SCNC: 145 MMOL/L (ref 132–146)
TARGET CELLS: ABNORMAL
TRIGL SERPL-MCNC: 49 MG/DL (ref 0–149)
VLDLC SERPL CALC-MCNC: 10 MG/DL
WBC # BLD: 6.6 E9/L (ref 4.5–11.5)

## 2022-05-03 PROCEDURE — 6370000000 HC RX 637 (ALT 250 FOR IP): Performed by: PHYSICIAN ASSISTANT

## 2022-05-03 PROCEDURE — 6370000000 HC RX 637 (ALT 250 FOR IP): Performed by: INTERNAL MEDICINE

## 2022-05-03 PROCEDURE — 6370000000 HC RX 637 (ALT 250 FOR IP)

## 2022-05-03 PROCEDURE — 6360000002 HC RX W HCPCS: Performed by: INTERNAL MEDICINE

## 2022-05-03 PROCEDURE — 83735 ASSAY OF MAGNESIUM: CPT

## 2022-05-03 PROCEDURE — C9113 INJ PANTOPRAZOLE SODIUM, VIA: HCPCS | Performed by: INTERNAL MEDICINE

## 2022-05-03 PROCEDURE — 80061 LIPID PANEL: CPT

## 2022-05-03 PROCEDURE — 36415 COLL VENOUS BLD VENIPUNCTURE: CPT

## 2022-05-03 PROCEDURE — 2500000003 HC RX 250 WO HCPCS: Performed by: PHYSICIAN ASSISTANT

## 2022-05-03 PROCEDURE — 85014 HEMATOCRIT: CPT

## 2022-05-03 PROCEDURE — APPSS60 APP SPLIT SHARED TIME 46-60 MINUTES: Performed by: PHYSICIAN ASSISTANT

## 2022-05-03 PROCEDURE — 2700000000 HC OXYGEN THERAPY PER DAY

## 2022-05-03 PROCEDURE — 99232 SBSQ HOSP IP/OBS MODERATE 35: CPT | Performed by: SURGERY

## 2022-05-03 PROCEDURE — 36430 TRANSFUSION BLD/BLD COMPNT: CPT

## 2022-05-03 PROCEDURE — 2500000003 HC RX 250 WO HCPCS: Performed by: INTERNAL MEDICINE

## 2022-05-03 PROCEDURE — 2140000000 HC CCU INTERMEDIATE R&B

## 2022-05-03 PROCEDURE — 85025 COMPLETE CBC W/AUTO DIFF WBC: CPT

## 2022-05-03 PROCEDURE — 6360000004 HC RX CONTRAST MEDICATION: Performed by: INTERNAL MEDICINE

## 2022-05-03 PROCEDURE — 85018 HEMOGLOBIN: CPT

## 2022-05-03 PROCEDURE — 93306 TTE W/DOPPLER COMPLETE: CPT

## 2022-05-03 PROCEDURE — 99222 1ST HOSP IP/OBS MODERATE 55: CPT | Performed by: INTERNAL MEDICINE

## 2022-05-03 PROCEDURE — 80048 BASIC METABOLIC PNL TOTAL CA: CPT

## 2022-05-03 PROCEDURE — 2580000003 HC RX 258: Performed by: INTERNAL MEDICINE

## 2022-05-03 RX ORDER — BUMETANIDE 0.25 MG/ML
2 INJECTION, SOLUTION INTRAMUSCULAR; INTRAVENOUS 2 TIMES DAILY
Status: DISCONTINUED | OUTPATIENT
Start: 2022-05-03 | End: 2022-05-07

## 2022-05-03 RX ORDER — SODIUM CHLORIDE 9 MG/ML
INJECTION, SOLUTION INTRAVENOUS PRN
Status: DISCONTINUED | OUTPATIENT
Start: 2022-05-03 | End: 2022-05-03

## 2022-05-03 RX ORDER — SODIUM CHLORIDE 9 MG/ML
INJECTION, SOLUTION INTRAVENOUS PRN
Status: DISCONTINUED | OUTPATIENT
Start: 2022-05-03 | End: 2022-05-05

## 2022-05-03 RX ORDER — METOPROLOL SUCCINATE 50 MG/1
50 TABLET, EXTENDED RELEASE ORAL 2 TIMES DAILY
Status: DISCONTINUED | OUTPATIENT
Start: 2022-05-03 | End: 2022-05-07

## 2022-05-03 RX ADMIN — Medication 10 ML: at 09:14

## 2022-05-03 RX ADMIN — SALINE NASAL SPRAY 1 SPRAY: 1.5 SOLUTION NASAL at 09:14

## 2022-05-03 RX ADMIN — METOPROLOL SUCCINATE 50 MG: 50 TABLET, EXTENDED RELEASE ORAL at 09:14

## 2022-05-03 RX ADMIN — PANTOPRAZOLE SODIUM 40 MG: 40 INJECTION, POWDER, FOR SOLUTION INTRAVENOUS at 23:06

## 2022-05-03 RX ADMIN — PANTOPRAZOLE SODIUM 40 MG: 40 INJECTION, POWDER, FOR SOLUTION INTRAVENOUS at 09:14

## 2022-05-03 RX ADMIN — BUMETANIDE 1 MG: 0.25 INJECTION, SOLUTION INTRAMUSCULAR; INTRAVENOUS at 09:14

## 2022-05-03 RX ADMIN — LEVOTHYROXINE SODIUM 200 MCG: 0.2 TABLET ORAL at 05:42

## 2022-05-03 RX ADMIN — PERFLUTREN 1.65 MG: 6.52 INJECTION, SUSPENSION INTRAVENOUS at 09:48

## 2022-05-03 RX ADMIN — BUMETANIDE 2 MG: 0.25 INJECTION INTRAMUSCULAR; INTRAVENOUS at 22:22

## 2022-05-03 RX ADMIN — FLUTICASONE PROPIONATE 2 SPRAY: 50 SPRAY, METERED NASAL at 10:58

## 2022-05-03 RX ADMIN — METOPROLOL SUCCINATE 50 MG: 50 TABLET, EXTENDED RELEASE ORAL at 23:06

## 2022-05-03 RX ADMIN — Medication 10 ML: at 23:20

## 2022-05-03 RX ADMIN — LEVOTHYROXINE SODIUM 50 MCG: 0.2 TABLET ORAL at 05:40

## 2022-05-03 RX ADMIN — DOXAZOSIN 2 MG: 2 TABLET ORAL at 09:14

## 2022-05-03 RX ADMIN — Medication 1000 UNITS: at 09:14

## 2022-05-03 NOTE — PLAN OF CARE
Patient's chart updated to reflect:      . - HF care plan, HF education points and HF discharge instructions.  -Orders: 2 gram sodium diet, daily weights, I/O.  -PCP and cardiology follow up appointments to be scheduled within 7 days of hospital discharge. -CHF education session will be provided to the patient prior to hospital discharge.     Nuria Shea RN RN, BSN  Heart Failure Navigator

## 2022-05-03 NOTE — PROGRESS NOTES
Nutrition Education      Provided educational handouts and sample meal plans on heart healthy/cardiac diet. Information in discharge instructions. Recommend outpatient nutrition counseling for further education.        Marty Lopez RD, LD  Contact Number: [phone]

## 2022-05-03 NOTE — PROGRESS NOTES
Hospitalist Progress Note      PCP: Lalit Hernandez DO    Date of Admission: 5/2/2022    Hospital Course:     78y.o. year old male Hx Abdominal aortic aneurysm (AAA) >39 mm diameter (HCC), Anemia, Cardiomyopathy, Carpal tunnel syndrome, CHF (congestive heart failure) , COPD, GERD, Hx of colonic polyps, Hypertension, Hypothyroidism, Rectal bleeding, Respiratory neoplasm, Sleep apnea, and Tobacco use presents with increased shortness of breath that he is experienced over the course of the last month. Patient lives alone and independently. Patient admits to chronic oxygen use and states he follows a pulmonologist and cardiologist at the [place]. It is chronic nature, worsened by activity, relieved only minimally by rest.  Patient also presents with bilateral 3+ pitting edema to lower extremities and scrotum, that he says has gotten worse over the last month. Patient also states that he has been experiencing dark tarry stools. Patient also presents with A. fib, and denies any history of having it, but takes Xarelto. In ER labs pertinent for elevated BUN of 24 and creatinine of 1.6 it seems to be patient's baseline. Elevated proBNP at 2562, elevated troponin, initially 69 elevated to 72. CBC shows a hemoglobin of 7.3, hemoglobin on a previous admission 8/13/2021 shows a hemoglobin of 14. 3. ABG showed pH of 7.361, CO2 73.1 bicarb 40.5, compensated respiratory acidosis. Patient was placed on BiPAP. A chest x-ray was completed and shows that the heart was enlarged. Pulmonary vessels are congested, mild opacity at the lung bases. Suspect early CHF. \"    Subjective:   He feels tired. He is dyspnea. No chest pain.       Medications:  Reviewed    Infusion Medications    sodium chloride      sodium chloride       Scheduled Medications    bumetanide  2 mg IntraVENous BID    metoprolol succinate  50 mg Oral BID    Budeson-Glycopyrrol-Formoterol  2 puff Inhalation BID    levothyroxine  200 mcg Oral Daily    [Held by provider] rivaroxaban  15 mg Oral Dinner    sodium chloride  1 spray Nasal Daily    doxazosin  2 mg Oral Daily    Vitamin D  1,000 Units Oral Daily    sodium chloride flush  5-40 mL IntraVENous 2 times per day    pantoprazole  40 mg IntraVENous BID    fluticasone  2 spray Nasal Daily    levothyroxine  50 mcg Oral Daily     PRN Meds: sodium chloride, albuterol, sodium chloride flush, sodium chloride, ondansetron **OR** ondansetron, magnesium hydroxide, acetaminophen **OR** acetaminophen      Intake/Output Summary (Last 24 hours) at 5/3/2022 1155  Last data filed at 5/3/2022 1103  Gross per 24 hour   Intake 300 ml   Output 1060 ml   Net -760 ml       Physical Exam Performed:    /64   Pulse 113   Temp 97.5 °F (36.4 °C) (Oral)   Resp 18   Wt 285 lb 11.2 oz (129.6 kg)   SpO2 100%   BMI 38.75 kg/m²     General appearance: No apparent distress, appears stated age and cooperative. Obese, nasal 02  HEENT: Pupils equal, round, and reactive to light. Conjunctivae/corneas clear. Neck: Supple, with full range of motion. No jugular venous distention. Trachea midline. Respiratory:  Normal respiratory effort. Clear to auscultation, bilaterally without Rales/Wheezes/Rhonchi. Cardiovascular: Regular rate and rhythm with normal S1/S2 without murmurs, rubs or gallops. Abdomen: Soft, non-tender, non-distended with normal bowel sounds. Musculoskeletal: No clubbing, cyanosis. +chronic venous stasis. +chronic edema. Skin: Skin color, texture, turgor normal.  No rashes or lesions. Neurologic:  Neurovascularly intact without any focal sensory/motor deficits.  Cranial nerves: II-XII intact, grossly non-focal.  Psychiatric: Alert and oriented, thought content appropriate, normal insight      Labs:   Recent Labs     05/02/22  1058 05/03/22  0431   WBC 5.9 6.6   HGB 7.3* 6.7*   HCT 30.1* 27.4*    248     Recent Labs     05/02/22  1058 05/03/22  0431    145   K 4.0 4.0   CL 97* 96*   CO2 43* 43*   BUN 24* 21 CREATININE 1.6* 1.7*   CALCIUM 9.1 8.7     No results for input(s): AST, ALT, BILIDIR, BILITOT, ALKPHOS in the last 72 hours. Recent Labs     05/02/22  1646   INR 1.5     No results for input(s): Aaron Rowland in the last 72 hours. Urinalysis:      Lab Results   Component Value Date    NITRU Negative 05/02/2022    WBCUA NONE 05/02/2022    BACTERIA NONE SEEN 05/02/2022    RBCUA 0-1 05/02/2022    BLOODU TRACE-INTACT 05/02/2022    SPECGRAV 1.010 05/02/2022    GLUCOSEU Negative 05/02/2022       Radiology:  XR CHEST PORTABLE   Final Result   Suspect early CHF. Assessment/Plan:    Active Hospital Problems    Diagnosis     Hypothyroidism [E03.9]      Priority: High    Decompensated heart failure (HCC) [I50.9]      Priority: Medium    Microcytic anemia [D50.9]      Priority: Medium    CKD (chronic kidney disease) stage 3, GFR 30-59 ml/min ([facility]) [N18.30]      Priority: Medium    Hypertension [I10]      Priority: Low    ZOE (obstructive sleep apnea) [G47.33]      Priority: Low    COPD (chronic obstructive pulmonary disease) ([facility]) [J44.9]      Priority: Low    Chronic respiratory failure with hypoxia and hypercapnia ([facility]) [J96.11, J96.12]     Paroxysmal atrial fibrillation ([facility]) [I48.0]     Anticoagulated [Z79.01]     Gastrointestinal hemorrhage [K92.2]      Decompensated heart failure on EF 55% on echocardiogram on 6/19/2020  -Continue IV diuretics with caution for low blood pressure  -Echocardiogram   -Daily weights  -Strict intake and output  -Consulted cardiology    Acute gastrointestinal bleed with associated blood loss anemia   -Hgb 7.3 on presentation, trending down to 6. Transfuse PRBC. Xarelto on hold. -Iron deficient. Iron supplementon discharge.  -Serial H&H   -Consulted general surgery. EGD tomorrow. Continue PPI.     CKD stage 3  -Cr stable    COPD  -Continue inhalers    Hypertension  -BP borderline, will monitor    Hypothyroidism  -Continue Synthroid    History of sleep apnea  -BiPAP      DVT Prophylaxis: Xarelto on hold  Diet: ADULT DIET; Regular;  Low Sodium (2 gm)  Code Status: Full Code    PT/OT Eval Status: as above    Dispo - Home in 1-2day    Nga Baires MD

## 2022-05-03 NOTE — CONSULTS
Patient currently admitted with diagnosis of Unknown heart failure, echo pending. Patient was alert and oriented laying in bed during the consultation. He was engaged and asked appropriate questions throughout the education session. He is agreeable to self monitoring, he states he has been monitoring his heart failure for years. Scheduling with the CHF clinic No: patient chooses to follow with [facility] only. Message left for the [facility] clinic to schedule hospital follow up visit with PCP. No future appointments. We reviewed the introduction to Heart Failure, the HF zones, signs and symptoms to report on day 1 of onset, medications, medication compliance, the importance of obtaining daily weights, following a low sodium diet, reading food labels for the sodium content, keeping physician appointments, and smoking cessation. We discussed writing / tracking daily weights on a calendar / log because a 5 pound gain in 1 week can sneak up if you are not tracking it. Contributing risk factors for Heart Failure are identified as recieves all care through the [place]. I advised patient they can reduce the risk for Heart Failure exacerbations by modifying / controlling the risk factors. We discussed self-managed care which includes the following:  to take medications as prescribed, report any intolerable side effects of medications to the cardiologist / doctor, do not just stop taking the medication; follow a cardiac heart healthy / low sodium diet; weigh yourself daily, exercise regularly- per doctor recommendation and not to smoke or use an excess amount of alcohol. We discussed calling the cardiologist / doctor with a weight gain of 3 pounds in one day or a total of 5 pounds or more in one week. Also, if you should have a significant weight loss of 3# or more in one day to call the doctor, they may need to decrease or hold the diuretic dose.  On days you feels nauseated and not eating / drinking, having emesis or diarrhea,  informed to call the cardiologist  / doctor, they may need to decrease or hold diuretic to avoid dehydration. I stressed the importance of informing their cardiologist the first day of onset of any of the signs and symptoms in the \"Yellow Zone\" of the HF Zones. Patient verbalizes understanding. Greater than 30 minutes was spent educating patient. The Heart Failure Booklet given to the patient with additional patient education addressing:  · What is Heart Failure? · Things You Can Do to Live Well with HF  · How to Take Your Medications  · How to Eat Less Salt  · Potter its Salt? · Exercising Well with Heart Failure  · Signs and symptoms of HF to report  · Weight Yourself Each Day  · Home Self Management- activity, weight tracking, taking medications as prescribed, meals /diet planning (sodium and fluid restriction), how to read food labels, keeping physician follow ups, smoking cessation, follow the Heart Failure Zones  · The Heart Failure zones  · Every Dose Every Day      Instructed  to call 911 if you have any of the following symptoms:    ·    Struggling to breathe unrelieved with rest,  ·    Having chest pain  ·    Have confusion or cant think clearly      The patient is ordered:  Diet: ADULT DIET; Regular;  Low Sodium (2 gm)   Sodium controlled diet Yes  Fluid restriction daily ordered (fluid restriction recommended if patient is hyponatremic and/or diuretic is initiated or increased) No  FR:   Daily Weights: Patient Vitals for the past 96 hrs (Last 3 readings):   Weight   05/03/22 0544 285 lb 11.2 oz (129.6 kg)   05/02/22 1031 290 lb (131.5 kg)     I/O:     Intake/Output Summary (Last 24 hours) at 5/3/2022 1214  Last data filed at 5/3/2022 1103  Gross per 24 hour   Intake 300 ml   Output 1060 ml   Net -760 ml            Yordan Fernández RN BSN, RN  Heart Failure Navigator        CONGESTIVE HEART FAILURE (CHF) AHA GUIDELINES  (Must be completed for Primary Diagnosis CHF or History of CHF)    Discharge Plan:  I placed the Heart Failure Home Instructions in patient's discharge instructions. Per Heart Failure [facility], the patient should have a follow-up appointment made within 7 days of discharge.     New Diagnosis No    ECHO Results most recent:  Lab Results   Component Value Date    LVEF 55 2020                                        Social History     Tobacco Use   Smoking Status Former Smoker    Quit date: 2000    Years since quittin.3   Smokeless Tobacco Never Used        Immunization History   Administered Date(s) Administered    Pneumococcal Polysaccharide ([id number]) 2016          Angiotensin-Converting-Enzyme (ACE) inhibitor ordered:  [] Yes  [x] No (specify contraindication):    [x] Contraindicated Noted allergy-Rash  [] Hypotensive patient who was at immediate risk of cardiogenic shock  [] Hospitalized patient who experienced marked azotemia  [] Other Contraindications  [] Not Eligible  [] Not Tolerant  [] Patient Reason  [] System Reason  [] Other Reason    Angiotensin II receptor blockers (ARB) ordered:  [] Yes  [x] No (specify contraindication):    [] Contraindicated  [] Hypotensive patient who was at immediate risk of cardiogenic shock  [] Hospitalized patient who experienced marked azotemia  [] Other Contraindications    ARNI - Angiotensin Receptor Neprilysin Inhibitor ordered:  [] Yes  [x] No (specify contraindication):    [] ACE inhibitor use within the prior 36 hours  [] Allergy  [] Hyperkalemia  [] Hypotension  [] Renal dysfunction defined as creatinine > 2.5 mg/dL in men or > 2.0 mg/dL in women  [] Other Contraindications  [] Not Eligible  [] Not Tolerant  [] Patient Reason  []System Reason  []Other Reason      Beta Blocker (Carvedilol, Metoprolol Succinate, or Bisoprolol) ordered:    [x] Yes Toprol XL  [] No (specify contraindication):    [] Contraindicated  [] Asthma  [] Fluid Overload  [] Low Blood Pressure  [] Patient recently treated with an intravenous positive inotropic agent  [] Other Contraindications  [] Not Eligible  [] Not Tolerant  [] Patient Reason  [] System Reason    SGLT2 Inhibitor ordered:  [] Yes  [x] No (specify contraindication):    [] Contraindicated  [] Patient currently on dialysis  [] Ketoacidosis  [] Known hypersensitivity to the medication  [] Type I diabetes (not approved for use in patients with Type I diabetes due to increased risk of ketoacidosis)  [] Other Contraindications  [] Not Eligible  [] Not Tolerant  [] Patient Reason  [x] System Reason  [] Other Reason    Aldosterone Antagonist ordered:  [] Yes  [x] No (specify contraindication):    [] Contraindicated  [] Allergy due to aldosterone receptor antagonist  [] Hyperkalemia  [] Renal dysfunction defined as creatinine >2.5 mg/dL in men or >2.0 mg/dL in women.   [] Other contraindications  [] Not Eligible  [] Not Tolerant  [] Patient Reason  [] System Reason  [] Other Reason

## 2022-05-03 NOTE — PROGRESS NOTES
General Surgery Progress Note:    CC: anemia    S: denies cp sob blood per rectum,       Objective:  @/64   Pulse 113   Temp 97.5 °F (36.4 °C) (Oral)   Resp 18   Wt 285 lb 11.2 oz (129.6 kg)   SpO2 100%   BMI 38.75 kg/m² @    Physical -     Gen: NAD  Resp: Breathing Comfortably, no resp distress  CV: Reg Rate no extra heart sounds  Abd: obese soft non tender   EXT nvi    Assessment/Plan: anemia hx OAC    Discussed EGD pt agreeable, will plan for EGD tomorrow  Serial HH transfuse as indicated, PPI,       Liliana Bo MD FACS   See d/c summary     11:40 AM

## 2022-05-03 NOTE — DISCHARGE INSTR - DIET
Good nutrition is important when healing from an illness, injury, or surgery. Follow any nutrition recommendations given to you during your hospital stay. If you were given an oral nutrition supplement while in the hospital, continue to take this supplement at home. You can take it with meals, in-between meals, and/or before bedtime. These supplements can be purchased at most local grocery stores, pharmacies, and chain super-stores. If you have any questions about your diet or nutrition, call the hospital and ask for the dietitian. A heart-healthy diet is recommended to reduce your unhealthy blood cholesterol levels, manage high blood pressure, and lower your risk for heart disease. To follow a heart-healthy diet,    Eat a balanced diet with whole grains, fruits and vegetables, and lean protein sources. Achieve and maintain a healthy weight. Choose heart-healthy unsaturated fats. Limit saturated fats, trans fats, and cholesterol intake. Eat more plant-based or vegetarian meals using beans and soy foods for protein. Eat whole, unprocessed foods to limit the amount of sodium (salt) you eat. Limit refined carbohydrates especially sugar, sweets and sugar-sweetened beverages. If you drink alcohol, do so in moderation: one serving per day (women) and two servings per day (men). One serving is equivalent to 12 ounces beer, 5 ounces wine, or 1.5 ounces distilled spirits         Tips  Tips for Choosing Heart-Healthy Fats  Choose lean protein and low-fat dairy foods to reduce saturated fat intake. Saturated fat is usually found in animal-based protein and is associated with certain health risks. Saturated fat is the biggest contributor to raised low-density lipoprotein (LDL) cholesterol levels in the diet. Research shows that limiting saturated fat lowers unhealthy cholesterol levels. Eat no more than 7% of your total calories each day from saturated fat.  Ask your RDN to help you determine how much saturated fat is right for you. There are many foods that do not contain large amounts of saturated fats. Swapping these foods to replace foods high in saturated fats will help you limit the saturated fat you eat and improve your cholesterol levels. You can also try eating more plant-based or vegetarian meals. Instead of    Try:    Whole milk, cheese, yogurt, and ice cream    1%, ½%, or skim milk, low-fat cheese, non-fat yogurt, and low-fat ice cream    Fatty, marbled beef and pork    Lean beef, pork, or venison    Poultry with skin    Poultry without skin    Butter, stick margarine    Reduced-fat, whipped, or liquid spreads    Coconut oil, palm oil    Liquid vegetable oils: corn, canola, olive, soybean and safflower oils         Avoid trans fats. Trans fats increase levels of LDL-cholesterol. Hydrogenated fat in processed foods is the main source of trans fats in foods. Trans fats can be found in stick margarine, shortening, processed sweets, baked goods, some fried foods, and packaged foods made with hydrogenated oils. Avoid foods with partially hydrogenated oil on the ingredient list such as: cookies, pastries, baked goods, biscuits, crackers, microwave popcorn, and frozen dinners. Choose foods with heart healthy fats. Polyunsaturated and monounsaturated fat are unsaturated fats that may help lower your blood cholesterol level when used in place of saturated fat in your diet. Ask your RDN about taking a dietary supplement with plant sterols and stanols to help lower your cholesterol level. Research shows that substituting saturated fats with unsaturated fats is beneficial to cholesterol levels. Try these easy swaps:        Instead of    Try:    Butter, stick margarine, or solid shortening    Reduced-fat, whipped, or liquid spreads    Beef, pork, or poultry with skin         Fish and seafood    Chips, crackers, snack foods    Raw or unsalted nuts and seeds or nut butters    Hummus with vegetables    Avocado on toast    Coconut oil, palm oil    Liquid vegetable oils: corn, canola, olive, soybean and safflower oils         Limit the amount of cholesterol you eat to less than 200 milligrams per day. Cholesterol is a substance carried through the bloodstream via lipoproteins, which are known as transporters of fat. Some body functions need cholesterol to work properly, but too much cholesterol in the bloodstream can damage arteries and build up blood vessel linings (which can lead to heart attack and stroke). You should eat less than 200 milligrams cholesterol per day. People respond differently to eating cholesterol. There is no test available right now that can figure out which people will respond more to dietary cholesterol and which will respond less. For individuals with high intake of dietary cholesterol, different types of increase (none, small, moderate, large) in LDL-cholesterol levels are all possible. Food sources of cholesterol include egg yolks and organ meats such as liver, gizzards. Limit egg yolks to two to four per week and avoid organ meats like liver and gizzards to control cholesterol intake. Tips for Choosing Heart-Healthy Carbohydrates  Consume foods rich in viscous (soluble) fiber    Viscous, or soluble, fiber is found in the walls of plant cells. Viscous fiber is found only in plant-based foods--animal-based foods like meat or dairy products do not contain fiber. In the stomach, viscous fibers absorb water and swell to form a thick, jelly-like mass. This helps to lower your unhealthy cholesterol  Rich sources of viscous fiber include asparagus, [place] sprouts, sweet potatoes, turnips, apricots, mangoes, oranges, legumes, barley, oats, and oat bran. Eat at least 5 to 10 grams of viscous fiber each day. As you increase your fiber intake gradually, also increase the amount of water you drink. This will help prevent constipation.   If you have difficulty achieving this goal, ask your RDN about fiber laxatives. Choose fiber supplements made with viscous fibers such as psyllium seed husks or methylcellulose to help lower unhealthy cholesterol. Limit refined carbohydrates    There are three types of carbohydrates: starches, sugar, and fiber. Some carbohydrates occur naturally in food, like the starches in rice or corn or the sugars in fruits and milk. Refined carbohydrates--foods with high amounts of simple sugars--can raise triglyceride levels. High triglyceride levels are associated with coronary heart disease. Some examples of refined carbohydrate foods are table sugar, sweets, and beverages sweetened with added sugar. Tips for Reducing Sodium (Salt)  Although sodium is important for your body to function, too much sodium can be harmful for people with high blood pressure. As sodium and fluid buildup in your tissues and bloodstream, your blood pressure increases. High blood pressure may cause damage to other organs and increase your risk for a stroke. Even if you take a pill for blood pressure or a water pill (diuretic) to remove fluid, it is still important to have less salt in your diet. Ask your doctor and RDN what amount of sodium is right for you. Avoid processed foods. Eat more fresh foods. Fresh fruits and vegetables are naturally low in sodium, as well as frozen vegetables and fruits that have no added juices or sauces. Fresh meats are lower in sodium than processed meats, such as champion, sausage, and hotdogs. Read the nutrition label or ask your  to help you find a fresh meat that is low in sodium. Eat less salt--at the table and when cooking. A single teaspoon of table salt has 2,300 mg of sodium. Leave the salt out of recipes for pasta, casseroles, and soups. Ask your RDN how to cook your favorite recipes without sodium  Be a smart . Look for food packages that say salt-free or sodium-free.  These items contain less than 5 milligrams of sodium per serving. Very low-sodium products contain less than 35 milligrams of sodium per serving. Low-sodium products contain less than 140 milligrams of sodium per serving. Beware of reduced salt or reduced sodium products. These items may still be high in sodium. Check the nutrition label. Add flavors to your food without adding sodium. Try lemon juice, lime juice, fruit juice or vinegar. Dry or fresh herbs add flavor. Try basil, bay leaf, dill, rosemary, parsley, mor, dry mustard, nutmeg, thyme, and paprika. Pepper, red pepper flakes, and cayenne pepper can add spice to your meals without adding sodium. Hot sauce contains sodium, but if you use just a drop or two, it will not add up to much. Buy a sodium-free seasoning blend or make your own at home. Additional Lifestyle Tips  Achieve and maintain a healthy weight. Talk with your RDN or your doctor about what is a healthy weight for you. Set goals to reach and maintain that weight. To lose weight, reduce your calorie intake along with increasing your physical activity. A weight loss of 10 to 15 pounds could reduce LDL-cholesterol by 5 milligrams per deciliter. Participate in physical activity. Talk with your health care team to find out what types of physical activity are best for you. Set a plan to get about 30 minutes of exercise on most days.   Foods Recommended  Food Group    Foods Recommended    Grains    Grain foods including whole grains: whole wheat, barley, rye, buckwheat, corn, teff, quinoa, millet, amaranth, brown or wild rice, sorghum, and oats    Processed whole grains such as pasta, rice, hot and cold cereals, and snacks that contain less than 300 mg sodium per serving    Whole grain bread, crackers, rolls, or kiel with <35 mg sodium per slice (Note: yeast breads usually have less sodium than those made with baking soda),    Home-made bread made with reduced-sodium baking soda    Protein Foods    Fresh red meat: lean, trimmed cuts of beef, pork, or lamb    Fresh poultry: skinless chicken or turkey    Fresh seafood: fish (particularly fatty fish: salmon, herring), shrimp, lobster, clams, and scallops    Eggs (2-4 per week), eggwhites or egg substitute    Nuts and seeds (unsalted): peanuts, almonds, pistachios, and sunflower seeds, unsalted; peanut butter, almond butter, and sunflower seed butter, reduced sodium. Soy foods: tofu, tempeh, or soynuts    Meat alternatives: veggie burgers and sausages from plant protein without added sodium    Legumes: such as dried beans, lentils, or peas at least a few times per week in place of other protein sources, unsalted    Dairy    Skim, ½% or 1% milk, low-fat yogurt, low-sodium cheeses (Swiss cheese, ricotta cheese, and fresh mozzarella, low sodium cottage cheese)    Fortified soymilk, almond milk, rice milk, hemp milk    Frozen desserts (½ cup) made from low-fat milk    Vegetables    Fresh, frozen, and canned (unsalted) whole vegetables, including dark-green, red and orange vegetables, legumes (beans and peas), and starchy vegetables without added sauces, salt, or sodium; low-sodium vegetable juices    Fruits    Fresh, frozen, canned and dried whole unsweetened fruits canned fruit packed in water or fruit juice without added sugar; 100% fruit juice    Oils    Unsaturated vegetable oils: [place], avocado, canola, cashew, corn, grapeseed, olive, safflower, sesame, soybean, sunflower    Margarines and spreads which list liquid vegetable oil as the first ingredient and does not contain trans fats (partially hydrogenated oil)    Salad dressings made from oil and low in sodium (salt)    Avocado    Other    Prepared foods, including soups, casseroles, and salads made from recommended ingredients and contain <600 mg sodium. Homemade soups, casseroles, entrees, and side dishes typically contain less sodium that prepared alternatives.     Homemade soups and sauces such as gravy    Low-sodium crackers, chips, pretzels    Low-sodium seasonings (ketchup, BBQ)    Spices, herbs, Salt-free seasoning mixes and marinades    Vinegar    Lemon or lime juice    Foods Not Recommended  Food Group    Foods Not Recommended    Grains    Breakfast cereals, packaged baked goods, snack crackers, and prepared grains with more than 300 mg sodium per serving    Biscuits, cornbread, and other quick breads prepared with baking soda    Breads or crackers topped with salt    Bakery products, such as doughnuts, biscuits, croissants, [place] pastries, pies, cookies    Instant potatoes, noodles, rice, stuffing mix, or macaroni and cheese    Snacks made with partially hydrogenated oils, including chips, cheese puffs, snack mixes, regular crackers, butter-flavored popcorn    Prepackaged bread crumbs    Self-rising flours    Protein Foods    Meats high in saturated fat such as ribs, t-bone steak, regular 70/30 hamburger    Processed red meats, such as champion, sausage, ham, pepperoni, hot dogs, corned beef, cured or smoked meats, canned meat, chili, luan sausage, sardines, and spam with added sodium    Deli meats, such as pastrami, bologna, or salami (made of meat or poultry) with added sodium    Organ meat such as liver, gizzards, or sweetbread    Preseasoned and precooked meats    Poultry with skin or  processed poultry (chicken and turkey) with skin, breading, or high-sodium marinades or sauces    Whole eggs or egg yolks (greater than 5 per week)    [facility], poultry, or fish    Smoked fish and meats    Salted legumes, nuts, seeds, or nut/seed butters    Meat alternatives with high levels of sodium (>300 mg per serving) or saturated fat (>5 g per serving)    Dairy    Whole milk,?2% fat milk, or Buttermilk    Cream, half-&-half    Cream cheese, sour cream    Regular and processed cheese or sauces    Regular-sodium cottage cheese    Vegetables    Canned or frozen vegetables with salt, fresh vegetables prepared with salt, butter, cheese, or cream sauce    Pickled vegetables such as olives, pickles, or sauerkraut    Tomato or pasta sauce with high levels of salt (>300 mg per serving)    Emogene Spillers vegetables    Fruits    None    Oils    Solid shortening or partially hydrogenated oils    Solid margarine made with hydrogenated or partially hydrogenated oils or trans fat    Salted margarine that contains trans fats    Butter (salted or unsalted)    Salad dressings with trans fat or high levels of sodium ([place], blue cheese, [place], [facility])    Tropical oils (coconut, palm, palm kernel oils)    Other    Sugary and/or fatty desserts, candy, and other sweets    Canned soups that are >600 mg of sodium    Frozen meals and prepared sides that are >600 mg of sodium    Store-bought egg beaters (with added sodium)    Salts:  sea salt, kosher salt, onion salt, and garlic salt, seasoning mixes containing salt    Flavorings: bouillon cubes, catsup or ketchup, barbeque sauce, Worcestershire sauce, soy sauce, salsa, relish, teriyaki sauce    Heart-Healthy - Reduced Sodium Vegan 1-Day Sample Menu   Breakfast  1 slice whole wheat toast  2 tablespoons peanut butter without salt  Tofu scramble made with: ½ cup calcium-set tofu  ½ cup green pepper  ½ cup spinach  ½ cup tomatoes  ½ cup white mushrooms  1 teaspoon canola oil  1 cup soymilk fortified with calcium, vitamin B12, and vitamin D  Lunch  1 cup reduced sodium split pea soup  1 whole wheat dinner roll  1 medium apple  Dinner  Salad made with: 1 cup lentils  ½ cup cooked broccoli  ½ cup cooked carrots  2 tablespoons hummus  1 cup sliced strawberries  1 cup soymilk fortified with calcium, vitamin B12, and vitamin D  Evening Snack  1 cup soy yogurt  ¼ cup mixed nuts  Daily Sum  Nutrient Unit Value  Macronutrients  Energy kcal 1672  Energy kJ 7000  Protein g 86  Total lipid (fat) g 65  Carbohydrate, by difference g 205  Fiber, total dietary g 47  Sugars, total g 73  Minerals  Calcium, Ca mg 1443  Iron, Fe mg 19  Sodium, Na mg 1148  Vitamins  Vitamin C, total ascorbic acid mg 253  Vitamin A, IU IU 59356  Vitamin D   Lipids  Fatty acids, total saturated g 11  Fatty acids, total monounsaturated g 29  Fatty acids, total polyunsaturated g 19  Cholesterol mg 5  Heart-Healthy - Reduced Sodium Vegetarian (Lacto-Ovo) Sample 1-Day Menu   Breakfast  1 cup cooked oatmeal  1 tablespoon ground flaxseed  1 cup blueberries  2 scrambled egg whites with 1 teaspoon canola oil  2 tablespoons salsa  1 cup fat-free milk  1 cup coffee  Oil, canola  Lunch  2 slices whole wheat bread  2 tablespoons peanut butter without salt  1 small banana  6 ounces fat-free plain yogurt  1 cup sliced red pepper  2 tablespoons hummus  1 cup fat-free milk  Evening Meal  Stir desai made with: ½ cup tofu  1 cup brown rice  ½ cup cooked broccoli  ½ cup cooked carrots  ½ cup cooked green beans  1 teaspoon peanut oil  Evening Snack  1 slice low-fat mozzarella cheese  1 medium apple  Daily Sum  Nutrient Unit Value  Macronutrients  Energy kcal 1764  Energy kJ 7375  Protein g 87  Total lipid (fat) g 53  Carbohydrate, by difference g 254  Fiber, total dietary g 35  Sugars, total g 98  Minerals  Calcium, Ca mg 1609  Iron, Fe mg 12  Sodium, Na mg 1434  Vitamins  Vitamin C, total ascorbic acid mg 210  Vitamin A, IU IU 00017  Vitamin D   Lipids  Fatty acids, total saturated g 12  Fatty acids, total monounsaturated g 21  Fatty acids, total polyunsaturated g 15  Cholesterol mg 31  Heart-Healthy Eating Sample 1-Day Menu   Breakfast  1 cup oatmeal  1 cup fat-free milk  1 cup blueberries  1 cup brewed coffee  1 ounce almonds  Lunch  2 slices whole-wheat bread  2 oz lean deli turkey breast  1 oz low-fat Swiss cheese  2 slices tomato  2 lettuce leaves  1 pear  1 cup skim milk  Afternoon Snack  1 oz trail mix (with nuts, seeds, raisins)  Evening Meal  3 oz broiled salmon  2/3 cup brown rice  1 tsp margarine  1/2 cup cooked broccoli  1/2 cup cooked carrots  1 cup tossed salad  1 teaspoon olive oil and vinegar dressing  1 small whole-wheat roll  1 tsp margarine  1 cup tea  Evening Snack  1 banana  Daily Sum  Nutrient Unit Value  Macronutrients  Energy kcal 2069  Energy kJ 8655  Protein g 146  Total lipid (fat) g 69  Carbohydrate, by difference g 228  Fiber, total dietary g 33  Sugars, total g 85  Minerals  Calcium, Ca mg 1292  Iron, Fe mg 14  Sodium, Na mg 1751  Vitamins  Vitamin C, total ascorbic acid mg 85  Vitamin A, IU IU 17312  Vitamin D   Lipids  Fatty acids, total saturated g 12  Fatty acids, total monounsaturated g 27  Fatty acids, total polyunsaturated g 24  Cholesterol mg 270

## 2022-05-03 NOTE — CONSULTS
Inpatient [facility] Cardiology Consultation      Reason for Consult: CHF    Consulting Physician: Dr. Jocelyn Stuart    Requesting Physician: Dr. Gilles Tesfaye     Date of Consultation: 5/3/2022    HISTORY OF PRESENT ILLNESS:   Patient is a 78year old WM known to Dr. Ariel Gresham only through inpatient consultation in 2018. He also follows with the [facility] for his medical care. He is being seen in consultation this hospital admission by Dr. Jocelyn Stuart for evaluation and recommendations regarding CHF. PMH: AAA, HTN, persistent vs. permanent A. fib on chronic Xarelto therapy, history of Aflutter (2018), chronic HFpEF / RV dysfunction, pulmonary hypertension, VHD, history of pericardial effusion, hypothyroidism on replacement therapy, ZOE noncompliant with treatment, severe COPD with chronic respiratory failure on 2-3L NC at home, former tobacco and alcohol abuse, GERD, history of GI bleed in 2018 with EGD revealing severe gastritis / mild duodenitis / healing antral ulcers, history of ascites of undetermined etiology, CKD, and SBO in 2016. Patient presented to [facility] on May 2, 2022 with complaints of peripheral edema, and shortness of breath. · Per the patient, he has chronic HOTL at baseline given his severe COPD and former tobacco abuse. He notes he only uses supplemental oxygen at home on a PRN basis. Over the past couple of weeks, patient has been noting of progressively worsening HOLT, to where he now notices dyspnea with minimal exertion. Has has been having to wear his supplemental O2 more often during this time period. He denies dyspnea at rest.  · Patient additionally admits to worsening peripheral edema of his bilateral LE, and significant scrotal edema with difficulty urinating. He notes of the following symptoms as well: rare palpitations, PND, abdominal distention. · Denies actual chest pain, just notes of \"difficulty taking a deep breath like his lungs are tight\" when he is SOB.   · Dark stools x one week --> general surgery planning endoscopy once CHF improved. Xarelto is on hold. · Admits to medication compliance, but notes of dietary indiscretions. Please note: past medical records were reviewed per electronic medical record (EMR) - see detailed reports under Past Medical/ Surgical History. PAST MEDICAL HISTORY:    · Ascending aortic aneurysm (follows with [facility])  · 4 x 4 cm on CTA chest (12/7/2018)  · HTN  · Persistent Afib, on chronic Xarelto therapy  · History of Aflutter (2018) --> Afib on EKG (2019) and on all available EKGs since  · Cardiac catheterization at the [facility] in 2013 with angiographically normal coronaries (report under Media)  · Chronic HFpEF/RV dysfunction. VHD. Pulmonary HTN. Pericardial effusion  · [facility] Pressures --> RA 12, PCWP 24, PA 55/27 mean 38, RV 55/9. CO 6.82. CI 2.64. · TTE 3/10/2016 (Dr. Wood Bernstein): The right ventricle is dilated at 4.5 cm.  The left atrium is dilated at 4.4 cm.  Right atrium significantly dilated at 28 cm2.  The left ventricle aortic root is within normal limits. The left ventricle shows concentric left ventricular hypertrophy at 1.3 cm.  Ejection fraction 60%.  No focal wall motion abnormality is seen. Diastolic filling dysfunction, stage 1, is seen. Mitral valve shows mild mitral annular calcification.  Maximal gradient 7 mmHg.  Mitral valve area 2.1 cm2 by pressure halftime.  There does not appear to be any significant mitral stenosis. Zaire Lava is trace to 1+ mitral regurgitation only. The aortic valve is calcified and sclerotic.  Leaflets still open fairly well.  Maximal gradient 19.9 mmHg, mean gradient 11.6.  On visualization, there does not appear to be any significant aortic stenosis.  Aortic valve area measured at 2.3 cm2. Zaire Lava is calcification of the valve, but no convincing evidence of significant aortic stenosis nor insufficiency.  There is no pulmonic stenosis nor insufficiency.  There is 1+ to at most 2+ tricuspid regurgitation with pulmonary hypertension at 39 mmHg. There is no significant pericardial effusion nor any definite  intracavitary mass, thrombus, or shunt identified on this study.  However,  this study was technically very difficult due to body habitus, and accuracy will be affected. · TTE (Dr. Marialuisa Leong, 6/2020): Summary: Ejection fraction is visually estimated at 55%. No regional wall motion abnormalities seen. Mild left ventricular concentric hypertrophy noted. Dilated right ventricle with reduced function. TAPSE is 1.1 cm. Left atrial volume index of 34 ml per meters squared BSA. Physiologic and/or trace mitral regurgitation is present. Mild tricuspid regurgitation. Pulmonary hypertension is moderate. RVSP is 58 mmHg. There is a small localized near left ventricle pericardial effusion noted (moderate size on TTE 2018). · [facility]): EF 50 to 55%. Moderate concentric LVH. Moderately dilated RV with moderate systolic dysfunction. Mild to moderate MR. Moderate biatrial enlargement. Moderate TR.  RVSP 80. Mild AR. Mild AS. Moderate pericardial effusion. · Hypothyroidism: on replacement therapy   · ZOE, uses nocturnal O2 - no CPAP/BiPAP  · Severe COPD with remote history of tobacco abuse  · ETOH abuse  · Chronic respiratory failure, on 2-3L NC at home (however patient reports PRN use)  · GERD. · History of GI Bleed in 2018  · EGD 12/2018: severe gastritis, mild duodenitis, healing antral ulcers, colonic diverticulosis and colon polyps  · History of right pleural effusion  · History of ascites. CTA Abdomen 2018 -- \"mild hepatic contour lobulation and a hepatic diffuse low-attenuation possibly representing underlying hepatic disease or hepatic steatosis. Moderate size ascites\"  · CKD.   Baseline Cr 1.4 to 1.6  · History of hospitalization in 2016 with PEA arrest in setting of hypoxia (CHF, untreated ZOE), however exact etiology unclear  · History of small bowel obstruction in 2016      PAST sodium chloride (OCEAN) 0.65 % nasal spray 1 spray by Nasal route daily   Yes Historical Provider, MD   OXYGEN Inhale 4 L into the lungs continuous   Yes Historical Provider, MD   torsemide (DEMADEX) 20 MG tablet Take 40 mg by mouth 2 times daily    Historical Provider, MD   rivaroxaban (XARELTO) 15 MG TABS tablet Take 15 mg by mouth Daily with supper     Historical Provider, MD   vitamin D 25 MCG (1000 UT) CAPS Take 1,000 Units by mouth daily     Historical Provider, MD   sucralfate (CARAFATE) 1 GM tablet Take 1 tablet by mouth three times daily 12/13/18 1/12/19  Jessica Abrams MD       CURRENT MEDICATIONS:      Current Facility-Administered Medications:     0.9 % sodium chloride infusion, , IntraVENous, PRN, Darwin Jones DO    albuterol (PROVENTIL) nebulizer solution 2.5 mg, 2.5 mg, Nebulization, Q6H PRN, Dar Brandt MD    Budeson-Glycopyrrol-Formoterol 160-9-4.8 MCG/ACT AERO 2 puff, 2 puff, Inhalation, BID, Dar Brandt MD    levothyroxine (SYNTHROID) tablet 200 mcg, 200 mcg, Oral, Daily, Dar Brandt MD, 200 mcg at 05/03/22 0542    metoprolol succinate (TOPROL XL) extended release tablet 50 mg, 50 mg, Oral, Daily, Dar Brandt MD    [Held by provider] rivaroxaban (XARELTO) tablet 15 mg, 15 mg, Oral, Dinner, Dar Brandt MD    sodium chloride (OCEAN, BABY AYR) 0.65 % nasal spray 1 spray, 1 spray, Nasal, Daily, Dar Brandt MD    doxazosin (CARDURA) tablet 2 mg, 2 mg, Oral, Daily, Dar Brandt MD, 2 mg at 05/02/22 1750    Vitamin D (CHOLECALCIFEROL) tablet 1,000 Units, 1,000 Units, Oral, Daily, Dar Brandt MD    sodium chloride flush 0.9 % injection 5-40 mL, 5-40 mL, IntraVENous, 2 times per day, Dar Brandt MD, 10 mL at 05/02/22 2258    sodium chloride flush 0.9 % injection 10 mL, 10 mL, IntraVENous, PRN, Dar Brandt MD    0.9 % sodium chloride infusion, , IntraVENous, PRN, Dar Brandt MD    ondansetron (ZOFRAN-ODT) disintegrating tablet 4 mg, 4 mg, Oral, Q8H PRN **OR** ondansetron (ZOFRAN) injection 4 mg, 4 mg, IntraVENous, Q6H PRN, Desmond Cota MD    magnesium hydroxide (MILK OF MAGNESIA) 400 MG/5ML suspension 30 mL, 30 mL, Oral, Daily PRN, Desmond Cota MD    acetaminophen (TYLENOL) tablet 650 mg, 650 mg, Oral, Q6H PRN **OR** acetaminophen (TYLENOL) suppository 650 mg, 650 mg, Rectal, Q6H PRN, Desmond Cota MD    perflutren lipid microspheres (DEFINITY) injection 1.65 mg, 1.5 mL, IntraVENous, ONCE PRN, Desmond Cota MD    bumetanide [facility]) injection 1 mg, 1 mg, IntraVENous, BID, Desmond Cota MD, 1 mg at 05/02/22 1743    pantoprazole (PROTONIX) injection 40 mg, 40 mg, IntraVENous, BID, Desmond Cota MD    fluticasone [facility]) 50 MCG/ACT nasal spray 2 spray, 2 spray, Nasal, Daily, Trinda Closs, APRN - CNP    levothyroxine (SYNTHROID) tablet 50 mcg, 50 mcg, Oral, Daily, Trinda Closs, APRN - CNP, 50 mcg at 05/03/22 0540      ALLERGIES:  Amlodipine, Isosorbide mononitrate [isosorbide nitrate], and Lisinopril    SOCIAL HISTORY:    Lives alone in [facility]. Does not require assistance with ambulation. Former tobacco smoker, quit approximately 20 years ago -- smoked 2 ppd for 10+ years. Prior alcohol abuse, quit \"many years ago\". Now just drinks socially. Was drinking a few glasses of whiskey 2-3 x per week. Denies former/current illicit drug use. FAMILY HISTORY:   Non-contributory at this time due to patient's advanced age      REVIEW OF SYSTEMS:     Negative except as noted above in HPI    PHYSICAL EXAM:   /77   Pulse 116   Temp 97.7 °F (36.5 °C) (Oral)   Resp 18   Wt 285 lb 11.2 oz (129.6 kg)   SpO2 99%   BMI 38.75 kg/m²   CONST:  Well developed, obese WM who appears stated age. Awake, alert, cooperative, no apparent distress. HEENT:   Head- Normocephalic, atraumatic. Eyes- Conjunctivae pink, anicteric.   Neck-  No stridor, trachea midline, +jugular venous distention. CHEST: Chest symmetrical and non-tender to palpation. No accessory muscle use or intercostal retractions. RESPIRATORY: Lung sounds - diminished with rales bilaterally  CARDIOVASCULAR:     No noted carotid bruit. Heart Ausculation- Irregularly irregular rhythm with fast rate, questionable soft systolic murmur  PV: 1+ bilateral lower extremity edema with scrotal edema noted. No clubbing or cyanosis. ABDOMEN: Soft, non-tender to light palpation, distended. Bowel sounds present. MS: Good muscle strength and tone. No atrophy or abnormal movements. SKIN: Warm and dry. NEURO / PSYCH: Oriented to person, place and time. Speech clear and appropriate. Follows all commands. Pleasant affect. DATA:    Telemetry: Afib with HR in the 110s-120s    Diagnostic:  All diagnostic testing and lab work thus far this admission reviewed in detail. CXR 5/2/2022  Impression:  Suspect early CHF.     No intake or output data in the 24 hours ending 05/03/22 0735    Labs:   CBC:   Recent Labs     05/02/22  1058 05/03/22  0431   WBC 5.9 6.6   HGB 7.3* 6.7*   HCT 30.1* 27.4*    248     BMP:   Recent Labs     05/02/22  1058 05/03/22  0431    145   K 4.0 4.0   CO2 43* 43*   BUN 24* 21   CREATININE 1.6* 1.7*   LABGLOM 42 39   CALCIUM 9.1 8.7     Mag:   Recent Labs     05/03/22  0431   MG 2.2     HgA1c:   Lab Results   Component Value Date    LABA1C 6.3 (H) 03/05/2016     PT/INR:   Recent Labs     05/02/22  1646   PROTIME 17.1*   INR 1.5     FASTING LIPID PANEL:  Lab Results   Component Value Date    CHOL 91 05/03/2022    HDL 37 05/03/2022    LDLCALC 44 05/03/2022    TRIG 49 05/03/2022      Ref Range & Units 05/02/22 1202 05/02/22 1058   Troponin, High Sensitivity 0 - 11 ng/L 72 High   69 High  CM       Ref Range & Units 05/02/22 1058   Pro-BNP 0 - 450 pg/mL 2,562 High        05/02/22 1800    Color, UA Straw/Yellow Yellow    Clarity, UA Clear Clear    Glucose, Ur Negative mg/dL Negative Bilirubin Urine Negative Negative    Ketones, Urine Negative mg/dL Negative    Specific Gravity, UA 1.005 - 1.030 1.010    Blood, Urine Negative TRACE-INTACT    pH, UA 5.0 - 9.0 7.0    Protein, UA Negative mg/dL Negative    Urobilinogen, Urine <2.0 E.U./dL 0.2    Nitrite, Urine Negative Negative    Leukocyte Esterase, Urine Negative Negative    WBC, UA 0 - 5 /HPF NONE    RBC, UA 0 - 2 /HPF 0-1    Bacteria, UA None Seen /HPF NONE SEEN       05/02/22 1240    Date Analyzed  [id number]    Time Analyzed  1240    Source:  Blood Arterial    pH, Blood Gas 7.350 - 7.450 7. 361    PCO2 35.0 - 45.0 mmHg 73.1 High Panic     PO2 75.0 - 100.0 mmHg 80.8    HCO3 22.0 - 26.0 mmol/L 40.5 High     B.E. -3.0 - 3.0 mmol/L 13.2 High     O2 Sat 92.0 - 98.5 % 94.5        ASSESSMENT:  · Acute on chronic HFpEF / RHF. Appears hypervolemic on exam.    · proBNP 1300 August 2021 --> now 2500  · Baseline weight per patient 270 lbs --> August 2021 noted to be 270 lbs --> now 285 lbs  · TTE 8/2021 at the [place]: EF 50-55%, moderate LVH, moderate RV dilation with moderate RV dysfunction  · Acute on chronic hypoxic / hypercapnic respiratory failure, currently on 6L NC  · Acute anemia with dark stools. Hgb 6.7 today, one unit pRBCs tranfusing  · Persistent vs. permanent  Afib, now with RVR  · History of Aflutter (2018) --> Afib on EKG (2019) and on all available EKGs since  · CHADsVASc of at least 3: on chronic reduced dose Xarelto therapy  · Mild to moderate MR, moderate TR, moderate pericardial effusion and pulmonary HTN with RVSP 80 mmHg on TTE at [place] 8/2021  · Elevated troponin, hS-troponin 69 --> 72.   Likely acute myocardial injury in setting of severe anemia, tachycardia, CHF.  · HTN, controlled  · [facility] 4x4 cm in 2018  · ZOE, non-compliant with treatment  · Severe COPD with remote history of tobacco abuse  · History of GI Bleed in 2018  · EGD 12/2018: severe gastritis, mild duodenitis, healing antral ulcers, colonic diverticulosis and colon polyps  · History of ETOH abuse  · CKD. Baseline Cr 1.4 to 1.6  · GERD  · Hypothyroidism, on replacement therapy  · History of ascites of unclear etiology        RECOMMENDATIONS:  · Records from [place] reviewed  · TTE for re-evaluation of LV / RV function and VHD / pericardial effusion  · Increase IV bumex to 2 mg BID  · Strict intake and output, daily weights  · Monitor renal function and electrolytes  · Increase Toprol XL to 50 mg BID with holding parameters  · Work-up of acute anemia per primary service / general surgery --> resume [address] once okay from general surgery standpoint  · Further recommendations to follow as per Dr. Mery Butler        The above case and recommendations have been discussed and made in collaboration with Dr. Mery Butler. NOTE: This report was transcribed using voice recognition software. Every effort was made to ensure accuracy; however, inadvertent computerized transcription errors may be present. Mae Santos, [address] [place] [address], [place] [address] Cardiology    Electronically signed by Stephanie Davalos PA-C on 5/3/2022 at 7:35 AM   ______________________________________________________________________  Cardiology attending attestation:  I have independently interviewed and examined the patient. I personally reviewed pertinent laboratory values and diagnostic testing (including, if applicable, chest xray, electrocardiogram, telemetry, echocardiogram, stress testing, and coronary angiography). I have reviewed the above documentation completed by Mae Santos PA-C including past medical, social, and family history and agree with the findings, assessment and plan except where noted. Plan formulated by me. I participated in all aspects of the medical decision making.   Please see my additional contributions to the HPI, physical exam, and assessment / medical decision making:  _______________________________________________________________________    53[facility], normal coronaries by cath in 2013, presenting with severe scrotal edema and reports of melena. He is on dose adjusted rivaroxaban for atrial fibrillation which has been persistent for several years. Denies chest pain or significant shortness of breath. Has had some orthopnea. Physical Exam:  /64   Pulse 113   Temp 97.5 °F (36.4 °C) (Oral)   Resp 18   Wt 285 lb 11.2 oz (129.6 kg)   SpO2 100%   BMI 38.75 kg/m²   General appearance: Obese, awake, alert, no acute respiratory distress  Skin: Intact, no rash  ENMT: Moist mucous membranes  Neck: Supple, no carotid bruits. Normal jugular venous pressure  Lungs: Clear to auscultation bilaterally. No wheezes, rales, or rhonchi  Cardiac: Irregular rhythm with a normal rate, tachycardic S1&S2, no murmurs apparent  Abdomen: Soft, positive bowel sounds, nontender  : Severely swollen scrotum approximately the size of a small watermelon  Extremities: Moves all extremities x 4, trace lower extremity edema  Neurologic: No focal motor deficits apparent, normal mood and affect    Telemetry: Atrial fibrillation 110s to 120s    Impression:   1. Acute on chronic HFpEF/right heart failure  2. Longstanding persistent AF  3. Pulmonary hypertension likely WHO group 2 and 3  4. Thoracic aortic aneurysm  5. ZOE not on CPAP  6. COPD    Recommendations:     Continue diuresis, strict I's and O's   Echo reviewed showing normal LVEF, enlarged right ventricle with reduced function and no significant valvular disease   Hold rivaroxaban   GI bleed per primary/surgery   Aggressive risk factor modification    Thank you for the consultation. Please do not hesitate to call with questions.     Jules Mattson MD, [facility] Cardiology

## 2022-05-04 ENCOUNTER — ANESTHESIA EVENT (OUTPATIENT)
Dept: ENDOSCOPY | Age: 77
DRG: 291 | End: 2022-05-04
Payer: OTHER GOVERNMENT

## 2022-05-04 ENCOUNTER — ANESTHESIA (OUTPATIENT)
Dept: ENDOSCOPY | Age: 77
DRG: 291 | End: 2022-05-04
Payer: OTHER GOVERNMENT

## 2022-05-04 VITALS — OXYGEN SATURATION: 95 % | SYSTOLIC BLOOD PRESSURE: 88 MMHG | DIASTOLIC BLOOD PRESSURE: 53 MMHG

## 2022-05-04 LAB
ANION GAP SERPL CALCULATED.3IONS-SCNC: 5 MMOL/L (ref 7–16)
ANISOCYTOSIS: ABNORMAL
BASOPHILS ABSOLUTE: 0 E9/L (ref 0–0.2)
BASOPHILS RELATIVE PERCENT: 0.2 % (ref 0–2)
BUN BLDV-MCNC: 22 MG/DL (ref 6–23)
CALCIUM SERPL-MCNC: 8.5 MG/DL (ref 8.6–10.2)
CHLORIDE BLD-SCNC: 92 MMOL/L (ref 98–107)
CO2: 41 MMOL/L (ref 22–29)
CREAT SERPL-MCNC: 1.6 MG/DL (ref 0.7–1.2)
EOSINOPHILS ABSOLUTE: 0.08 E9/L (ref 0.05–0.5)
EOSINOPHILS RELATIVE PERCENT: 0.9 % (ref 0–6)
GFR AFRICAN AMERICAN: 51
GFR NON-AFRICAN AMERICAN: 42 ML/MIN/1.73
GLUCOSE BLD-MCNC: 102 MG/DL (ref 74–99)
HCT VFR BLD CALC: 28 % (ref 37–54)
HCT VFR BLD CALC: 29.1 % (ref 37–54)
HCT VFR BLD CALC: 29.1 % (ref 37–54)
HEMOGLOBIN: 7.1 G/DL (ref 12.5–16.5)
HEMOGLOBIN: 7.2 G/DL (ref 12.5–16.5)
HEMOGLOBIN: 7.3 G/DL (ref 12.5–16.5)
HYPOCHROMIA: ABNORMAL
LYMPHOCYTES ABSOLUTE: 0.26 E9/L (ref 1.5–4)
LYMPHOCYTES RELATIVE PERCENT: 2.6 % (ref 20–42)
MAGNESIUM: 2.2 MG/DL (ref 1.6–2.6)
MCH RBC QN AUTO: 18.2 PG (ref 26–35)
MCHC RBC AUTO-ENTMCNC: 24.7 % (ref 32–34.5)
MCV RBC AUTO: 73.5 FL (ref 80–99.9)
MONOCYTES ABSOLUTE: 0.78 E9/L (ref 0.1–0.95)
MONOCYTES RELATIVE PERCENT: 8.7 % (ref 2–12)
NEUTROPHILS ABSOLUTE: 7.66 E9/L (ref 1.8–7.3)
NEUTROPHILS RELATIVE PERCENT: 87.8 % (ref 43–80)
OVALOCYTES: ABNORMAL
PDW BLD-RTO: 24.7 FL (ref 11.5–15)
PLATELET # BLD: 245 E9/L (ref 130–450)
PMV BLD AUTO: 9.6 FL (ref 7–12)
POIKILOCYTES: ABNORMAL
POLYCHROMASIA: ABNORMAL
POTASSIUM SERPL-SCNC: 4.1 MMOL/L (ref 3.5–5)
PRO-BNP: 3030 PG/ML (ref 0–450)
RBC # BLD: 3.96 E12/L (ref 3.8–5.8)
SODIUM BLD-SCNC: 138 MMOL/L (ref 132–146)
TEAR DROP CELLS: ABNORMAL
WBC # BLD: 8.7 E9/L (ref 4.5–11.5)

## 2022-05-04 PROCEDURE — 6370000000 HC RX 637 (ALT 250 FOR IP)

## 2022-05-04 PROCEDURE — 2140000000 HC CCU INTERMEDIATE R&B

## 2022-05-04 PROCEDURE — 3609012400 HC EGD TRANSORAL BIOPSY SINGLE/MULTIPLE: Performed by: SURGERY

## 2022-05-04 PROCEDURE — 0DB68ZX EXCISION OF STOMACH, VIA NATURAL OR ARTIFICIAL OPENING ENDOSCOPIC, DIAGNOSTIC: ICD-10-PCS | Performed by: SURGERY

## 2022-05-04 PROCEDURE — 3700000000 HC ANESTHESIA ATTENDED CARE: Performed by: SURGERY

## 2022-05-04 PROCEDURE — 83735 ASSAY OF MAGNESIUM: CPT

## 2022-05-04 PROCEDURE — 2580000003 HC RX 258: Performed by: ANESTHESIOLOGIST ASSISTANT

## 2022-05-04 PROCEDURE — 43239 EGD BIOPSY SINGLE/MULTIPLE: CPT | Performed by: SURGERY

## 2022-05-04 PROCEDURE — 2709999900 HC NON-CHARGEABLE SUPPLY: Performed by: SURGERY

## 2022-05-04 PROCEDURE — 88305 TISSUE EXAM BY PATHOLOGIST: CPT

## 2022-05-04 PROCEDURE — 3700000001 HC ADD 15 MINUTES (ANESTHESIA): Performed by: SURGERY

## 2022-05-04 PROCEDURE — 99233 SBSQ HOSP IP/OBS HIGH 50: CPT | Performed by: INTERNAL MEDICINE

## 2022-05-04 PROCEDURE — 85018 HEMOGLOBIN: CPT

## 2022-05-04 PROCEDURE — 85014 HEMATOCRIT: CPT

## 2022-05-04 PROCEDURE — 6360000002 HC RX W HCPCS: Performed by: ANESTHESIOLOGIST ASSISTANT

## 2022-05-04 PROCEDURE — C9113 INJ PANTOPRAZOLE SODIUM, VIA: HCPCS | Performed by: INTERNAL MEDICINE

## 2022-05-04 PROCEDURE — 2700000000 HC OXYGEN THERAPY PER DAY

## 2022-05-04 PROCEDURE — 85025 COMPLETE CBC W/AUTO DIFF WBC: CPT

## 2022-05-04 PROCEDURE — 6370000000 HC RX 637 (ALT 250 FOR IP): Performed by: PHYSICIAN ASSISTANT

## 2022-05-04 PROCEDURE — 6360000002 HC RX W HCPCS: Performed by: INTERNAL MEDICINE

## 2022-05-04 PROCEDURE — 36415 COLL VENOUS BLD VENIPUNCTURE: CPT

## 2022-05-04 PROCEDURE — 7100000000 HC PACU RECOVERY - FIRST 15 MIN: Performed by: SURGERY

## 2022-05-04 PROCEDURE — 7100000001 HC PACU RECOVERY - ADDTL 15 MIN: Performed by: SURGERY

## 2022-05-04 PROCEDURE — 6370000000 HC RX 637 (ALT 250 FOR IP): Performed by: INTERNAL MEDICINE

## 2022-05-04 PROCEDURE — 94660 CPAP INITIATION&MGMT: CPT

## 2022-05-04 PROCEDURE — 2500000003 HC RX 250 WO HCPCS: Performed by: PHYSICIAN ASSISTANT

## 2022-05-04 PROCEDURE — 2580000003 HC RX 258: Performed by: INTERNAL MEDICINE

## 2022-05-04 PROCEDURE — 88342 IMHCHEM/IMCYTCHM 1ST ANTB: CPT

## 2022-05-04 PROCEDURE — 83880 ASSAY OF NATRIURETIC PEPTIDE: CPT

## 2022-05-04 PROCEDURE — 80048 BASIC METABOLIC PNL TOTAL CA: CPT

## 2022-05-04 RX ORDER — PROPOFOL 10 MG/ML
INJECTION, EMULSION INTRAVENOUS PRN
Status: DISCONTINUED | OUTPATIENT
Start: 2022-05-04 | End: 2022-05-04 | Stop reason: SDUPTHER

## 2022-05-04 RX ORDER — SODIUM CHLORIDE 9 MG/ML
INJECTION, SOLUTION INTRAVENOUS CONTINUOUS PRN
Status: DISCONTINUED | OUTPATIENT
Start: 2022-05-04 | End: 2022-05-04 | Stop reason: SDUPTHER

## 2022-05-04 RX ADMIN — LEVOTHYROXINE SODIUM 200 MCG: 0.2 TABLET ORAL at 06:30

## 2022-05-04 RX ADMIN — BUMETANIDE 2 MG: 0.25 INJECTION INTRAMUSCULAR; INTRAVENOUS at 22:47

## 2022-05-04 RX ADMIN — BUMETANIDE 2 MG: 0.25 INJECTION INTRAMUSCULAR; INTRAVENOUS at 10:00

## 2022-05-04 RX ADMIN — PANTOPRAZOLE SODIUM 40 MG: 40 INJECTION, POWDER, FOR SOLUTION INTRAVENOUS at 10:00

## 2022-05-04 RX ADMIN — Medication 10 ML: at 22:47

## 2022-05-04 RX ADMIN — SODIUM CHLORIDE: 9 INJECTION, SOLUTION INTRAVENOUS at 16:00

## 2022-05-04 RX ADMIN — METOPROLOL SUCCINATE 50 MG: 50 TABLET, EXTENDED RELEASE ORAL at 22:47

## 2022-05-04 RX ADMIN — PROPOFOL 50 MG: 10 INJECTION, EMULSION INTRAVENOUS at 16:02

## 2022-05-04 RX ADMIN — PANTOPRAZOLE SODIUM 40 MG: 40 INJECTION, POWDER, FOR SOLUTION INTRAVENOUS at 22:48

## 2022-05-04 RX ADMIN — LEVOTHYROXINE SODIUM 50 MCG: 0.2 TABLET ORAL at 06:30

## 2022-05-04 RX ADMIN — Medication 10 ML: at 10:00

## 2022-05-04 ASSESSMENT — PAIN DESCRIPTION - LOCATION: LOCATION: ANKLE;BUTTOCKS

## 2022-05-04 ASSESSMENT — PAIN DESCRIPTION - ORIENTATION: ORIENTATION: LEFT;RIGHT

## 2022-05-04 ASSESSMENT — PAIN - FUNCTIONAL ASSESSMENT: PAIN_FUNCTIONAL_ASSESSMENT: PREVENTS OR INTERFERES SOME ACTIVE ACTIVITIES AND ADLS

## 2022-05-04 ASSESSMENT — PAIN DESCRIPTION - DESCRIPTORS: DESCRIPTORS: ACHING;DISCOMFORT;SORE

## 2022-05-04 ASSESSMENT — PAIN DESCRIPTION - PAIN TYPE: TYPE: CHRONIC PAIN

## 2022-05-04 ASSESSMENT — PAIN SCALES - GENERAL: PAINLEVEL_OUTOF10: 6

## 2022-05-04 NOTE — ANESTHESIA PRE PROCEDURE
Department of Anesthesiology  Preprocedure Note       Name:  Robert Sewell   Age:  78 y.o.  :  1945                                          MRN:  [id number]         Date:  2022      Surgeon: Corin Car):  Rodger Rodríguez MD    Procedure: Procedure(s):  EGD ESOPHAGOGASTRODUODENOSCOPY    Medications prior to admission:   Prior to Admission medications    Medication Sig Start Date End Date Taking?  Authorizing Provider   fluticasone (FLONASE) 50 MCG/ACT nasal spray 2 sprays by Nasal route daily   Yes Historical Provider, MD   levothyroxine (SYNTHROID) 50 MCG tablet Take 50 mcg by mouth Daily Given with Levothyroxine 200 mcg total dose Levothyroxine 250 mcg   Yes Historical Provider, MD   levothyroxine (SYNTHROID) 200 MCG tablet Take 200 mcg by mouth Daily Given with Levothyroxine 50 mcg total dose Levothyroxine 250 mcg   Yes Historical Provider, MD   metoprolol succinate (TOPROL XL) 50 MG extended release tablet Take 50 mg by mouth daily   Yes Historical Provider, MD   ketotifen (ZADITOR) 0.025 % ophthalmic solution Place 1 drop into both eyes 2 times daily as needed   Yes Historical Provider, MD   albuterol sulfate HFA (VENTOLIN HFA) 108 (90 Base) MCG/ACT inhaler Inhale 1 puff into the lungs every 6 hours as needed for Wheezing   Yes Historical Provider, MD   terazosin (HYTRIN) 2 MG capsule Take 4 mg by mouth nightly   Yes Historical Provider, MD   Budeson-Glycopyrrol-Formoterol (BREZTRI AEROSPHERE) 160-9-4.8 MCG/ACT AERO Inhale 2 puffs into the lungs 2 times daily   Yes Historical Provider, MD   albuterol (PROVENTIL) (2.5 MG/3ML) 0.083% nebulizer solution Take 2.5 mg by nebulization every 6 hours as needed for Wheezing   Yes Historical Provider, MD   sodium chloride (OCEAN) 0.65 % nasal spray 1 spray by Nasal route daily   Yes Historical Provider, MD   OXYGEN Inhale 4 L into the lungs continuous   Yes Historical Provider, MD   torsemide (DEMADEX) 20 MG tablet Take 40 mg by mouth 2 times daily    Historical Provider, MD   rivaroxaban (XARELTO) 15 MG TABS tablet Take 15 mg by mouth Daily with supper     Historical Provider, MD   vitamin D 25 MCG (1000 UT) CAPS Take 1,000 Units by mouth daily     Historical Provider, MD   sucralfate (CARAFATE) 1 GM tablet Take 1 tablet by mouth three times daily 12/13/18 1/12/19  Rika Meyer MD       Current medications:    Current Facility-Administered Medications   Medication Dose Route Frequency Provider Last Rate Last Admin    0.9 % sodium chloride infusion   IntraVENous PRN DO [facility]) injection 2 mg  2 mg IntraVENous BID [facility], PA-C   2 mg at 05/04/22 1000    metoprolol succinate (TOPROL XL) extended release tablet 50 mg  50 mg Oral BID [facility]   50 mg at 05/03/22 2306    albuterol (PROVENTIL) nebulizer solution 2.5 mg  2.5 mg Nebulization Q6H PRN Sharyle Furl, MD        Budeson-Glycopyrrol-Formoterol 160-9-4.8 MCG/ACT AERO 2 puff  2 puff Inhalation BID Sharyle Furl, MD   2 puff at 05/03/22 2311    levothyroxine (SYNTHROID) tablet 200 mcg  200 mcg Oral Daily Sharyle Furl, MD   200 mcg at 05/04/22 0630    [Held by provider] rivaroxaban (XARELTO) tablet 15 mg  15 mg Oral Dinner Sharyle Furl, MD        sodium chloride (OCEAN, BABY AYR) 0.65 % nasal spray 1 spray  1 spray Nasal Daily Sharyle Furl, MD   1 spray at 05/03/22 0914    doxazosin (CARDURA) tablet 2 mg  2 mg Oral Daily Sharyle Furl, MD   2 mg at 05/03/22 0914    Vitamin D (CHOLECALCIFEROL) tablet 1,000 Units  1,000 Units Oral Daily Sharyle Furl, MD   1,000 Units at 05/03/22 0914    sodium chloride flush 0.9 % injection 5-40 mL  5-40 mL IntraVENous 2 times per day Sharyle Furl, MD   10 mL at 05/04/22 1000    sodium chloride flush 0.9 % injection 10 mL  10 mL IntraVENous PRN Sharyle Furl, MD        0.9 % sodium chloride infusion   IntraVENous PRN Sharyle Furl, MD        ondansetron (ZOFRAN-ODT) disintegrating tablet 4 mg  4 mg Oral Q8H PRN Dar Brandt MD        Or    [facility]dansetron [facility]) injection 4 mg  4 mg IntraVENous Q6H PRN Dar Brandt MD        magnesium hydroxide (MILK OF MAGNESIA) 400 MG/5ML suspension 30 mL  30 mL Oral Daily PRN Dar Brandt MD        acetaminophen (TYLENOL) tablet 650 mg  650 mg Oral Q6H PRN Dar Brandt MD        Or    acetaminophen (TYLENOL) suppository 650 mg  650 mg Rectal Q6H PRN Dar Brandt MD        pantoprazole (PROTONIX) injection 40 mg  40 mg IntraVENous BID Dar Brandt MD   40 mg at 05/04/22 1000    fluticasone (FLONASE) 50 MCG/ACT nasal spray 2 spray  2 spray Nasal Daily Veronda Lemme, APRN - CNP   2 spray at 05/03/22 1058    levothyroxine (SYNTHROID) tablet 50 mcg  50 mcg Oral Daily Veronda Lemme, APRN - CNP   50 mcg at 05/04/22 0630       Allergies:     Allergies   Allergen Reactions    Amlodipine Swelling    Isosorbide Mononitrate [Isosorbide Nitrate] Other (See Comments)     Migraine headaches    Lisinopril Rash     cough       Problem List:    Patient Active Problem List   Diagnosis Code    Hypertension I10    ZOE (obstructive sleep apnea) G47.33    Cardiomyopathy ([facility]) I42.9    COPD (chronic obstructive pulmonary disease) ([facility]) J44.9    Hypothyroidism E03.9    Chronic respiratory failure with hypoxia and hypercapnia ([facility]) J96.11, J96.12    Congestive heart failure (HCC) I50.9    Paroxysmal atrial fibrillation (HCC) I48.0    Anticoagulated Z79.01    Gastrointestinal hemorrhage K92.2    Atrial fibrillation, chronic ([facility]) I48.20    Pericardial effusion I31.3    JUDITH (acute kidney injury) ([facility] [address] N17.9    Acute on chronic diastolic heart failure (HCC) I50.33    Acute on chronic respiratory failure ([facility]) J96.20    Decompensated heart failure (HCC) I50.9    Microcytic anemia D50.9    CKD (chronic kidney disease) stage 3, GFR 30-59 ml/min ([facility]) N18.30       Past Medical History:        Diagnosis Date    Abdominal aortic aneurysm (AAA) >39 mm diameter (HCC)     Anemia     Cardiomyopathy (HCC)     Carpal tunnel syndrome     CHF (congestive heart failure) (HCC)     echo 3/5/16 ef 41%, stage 1 diastolic    COPD (chronic obstructive pulmonary disease) (HCC)     GERD (gastroesophageal reflux disease)     Hx of colonic polyps     Hypertension     Hypothyroidism     Rectal bleeding     Respiratory neoplasm     Sleep apnea     Tobacco use        Past Surgical History:        Procedure Laterality Date    ABDOMEN SURGERY      CARDIAC CATHETERIZATION      CHOLECYSTECTOMY      COLONOSCOPY      COLONOSCOPY N/A 2018    COLONOSCOPY DIAGNOSTIC performed by Davida Rmoero MD at [address] [place] [address], ESOPHAGUS      ENDOSCOPY, COLON, DIAGNOSTIC      FRACTURE SURGERY      JOINT REPLACEMENT      SALIVARY GLAND SURGERY      UPPER GASTROINTESTINAL ENDOSCOPY N/A 2018    EGD DIAGNOSTIC ONLY performed by Davida Romero MD at [facility]. [place] [address]         Social History:    Social History     Tobacco Use    Smoking status: Former Smoker     Quit date: 2000     Years since quittin.3    Smokeless tobacco: Never Used   Substance Use Topics    Alcohol use:  Yes     Alcohol/week: 21.0 standard drinks     Types: 21 Shots of liquor per week     Comment: hasn't drank in three weeks                                Counseling given: Not Answered      Vital Signs (Current):   Vitals:    22 1515 22 0048 22 0826   BP: 96/65 108/78  109/69   Pulse: 95 69  101   Resp: 24 18  18   Temp: 36.7 °C (98.1 °F) 36.8 °C (98.2 °F)  36.7 °C (98.1 °F)   TempSrc: Oral Oral  Oral   SpO2: 100% 100%  99%   Weight:   290 lb 4.8 oz (131.7 kg)                                               BP Readings from Last 3 Encounters:   22 109/69   21 134/88   20 116/78       NPO Status:   >8 hrs BMI:   Wt Readings from Last 3 Encounters:   05/04/22 290 lb 4.8 oz (131.7 kg)   08/13/21 270 lb (122.5 kg)   06/27/20 242 lb (109.8 kg)     Body mass index is 39.37 kg/m². CBC:   Lab Results   Component Value Date    WBC 8.7 05/04/2022    RBC 3.96 05/04/2022    HGB 7.3 05/04/2022    HCT 29.1 05/04/2022    MCV 73.5 05/04/2022    RDW 24.7 05/04/2022     05/04/2022       CMP:   Lab Results   Component Value Date     05/04/2022    K 4.1 05/04/2022    K 4.0 05/02/2022    CL 92 05/04/2022    CO2 41 05/04/2022    BUN 22 05/04/2022    CREATININE 1.6 05/04/2022    GFRAA 51 05/04/2022    LABGLOM 42 05/04/2022    GLUCOSE 102 05/04/2022    PROT 6.6 08/13/2021    CALCIUM 8.5 05/04/2022    BILITOT 0.7 08/13/2021    ALKPHOS 88 08/13/2021    AST 23 08/13/2021    ALT 16 08/13/2021       POC Tests: No results for input(s): POCGLU, POCNA, POCK, POCCL, POCBUN, POCHEMO, POCHCT in the last 72 hours.     Coags:   Lab Results   Component Value Date    PROTIME 17.1 05/02/2022    INR 1.5 05/02/2022    APTT 33.3 03/05/2016       HCG (If Applicable): No results found for: PREGTESTUR, PREGSERUM, HCG, HCGQUANT     ABGs:   Lab Results   Component Value Date    KJP4GLL 56.4 06/23/2020        Type & Screen (If Applicable):  No results found for: LABABO, LABRH    Drug/Infectious Status (If Applicable):  No results found for: HIV, HEPCAB    COVID-19 Screening (If Applicable):   Lab Results   Component Value Date    COVID19 Not Detected 06/19/2020           Anesthesia Evaluation  Patient summary reviewed and Nursing notes reviewed no history of anesthetic complications:   Airway: Mallampati: II  TM distance: >3 FB   Neck ROM: limited  Mouth opening: > = 3 FB Dental:    (+) upper dentures and lower dentures      Pulmonary:normal exam    (+) COPD:  sleep apnea:                             Cardiovascular:    (+) hypertension:, CHF:,         Rhythm: regular  Rate: normal                    Neuro/Psych:   (+) neuromuscular disease:,             GI/Hepatic/Renal:   (+) GERD: well controlled,           Endo/Other:    (+) hypothyroidism::., .                 Abdominal:   (+) obese,           Vascular: Other Findings:             Anesthesia Plan      MAC     ASA 4       Induction: intravenous. Anesthetic plan and risks discussed with patient. Plan discussed with attending.                   VALERIA Velazquez - CRNA   5/4/2022

## 2022-05-04 NOTE — PROGRESS NOTES
Patient transferred to floor on bed on 4lnc and telemetry monitor in stable condition with ancillary staff.

## 2022-05-04 NOTE — PROGRESS NOTES
Hospitalist Progress Note      PCP: Lisa Schaffer DO    Date of Admission: 5/2/2022    Hospital Course:     78y.o. year old male Hx Abdominal aortic aneurysm (AAA) >39 mm diameter (HCC), Anemia, Cardiomyopathy, Carpal tunnel syndrome, CHF (congestive heart failure) , COPD, GERD, Hx of colonic polyps, Hypertension, Hypothyroidism, Rectal bleeding, Respiratory neoplasm, Sleep apnea, and Tobacco use presents with increased shortness of breath that he is experienced over the course of the last month. Patient lives alone and independently. Patient admits to chronic oxygen use and states he follows a pulmonologist and cardiologist at the [facility]. It is chronic nature, worsened by activity, relieved only minimally by rest.  Patient also presents with bilateral 3+ pitting edema to lower extremities and scrotum, that he says has gotten worse over the last month. Patient also states that he has been experiencing dark tarry stools. Patient also presents with A. fib, and denies any history of having it, but takes Xarelto. In ER labs pertinent for elevated BUN of 24 and creatinine of 1.6 it seems to be patient's baseline. Elevated proBNP at 2562, elevated troponin, initially 69 elevated to 72. CBC shows a hemoglobin of 7.3, hemoglobin on a previous admission 8/13/2021 shows a hemoglobin of 14. 3. ABG showed pH of 7.361, CO2 73.1 bicarb 40.5, compensated respiratory acidosis. Patient was placed on BiPAP. A chest x-ray was completed and shows that the heart was enlarged. Pulmonary vessels are congested, mild opacity at the lung bases. Suspect early CHF. \"    Subjective:   He has been sleeping. He has some improvement in dyspnea. He complains he has issues with standing. He feels his legs are weak. No chest pain.       Medications:  Reviewed    Infusion Medications    sodium chloride      sodium chloride       Scheduled Medications    bumetanide  2 mg IntraVENous BID    metoprolol succinate  50 mg Oral BID    Budeson-Glycopyrrol-Formoterol  2 puff Inhalation BID    levothyroxine  200 mcg Oral Daily    [Held by provider] rivaroxaban  15 mg Oral Dinner    sodium chloride  1 spray Nasal Daily    doxazosin  2 mg Oral Daily    Vitamin D  1,000 Units Oral Daily    sodium chloride flush  5-40 mL IntraVENous 2 times per day    pantoprazole  40 mg IntraVENous BID    fluticasone  2 spray Nasal Daily    levothyroxine  50 mcg Oral Daily     PRN Meds: sodium chloride, albuterol, sodium chloride flush, sodium chloride, ondansetron **OR** ondansetron, magnesium hydroxide, acetaminophen **OR** acetaminophen      Intake/Output Summary (Last 24 hours) at 5/4/2022 1256  Last data filed at 5/4/2022 1156  Gross per 24 hour   Intake 0 ml   Output 2000 ml   Net -2000 ml       Physical Exam Performed:    /69   Pulse 101   Temp 98.1 °F (36.7 °C) (Oral)   Resp 18   Wt 290 lb 4.8 oz (131.7 kg)   SpO2 99%   BMI 39.37 kg/m²     General appearance: No apparent distress, appears stated age and cooperative. Obese, nasal 02  HEENT: Pupils equal, round, and reactive to light. Conjunctivae/corneas clear. Neck: Supple, with full range of motion. No jugular venous distention. Trachea midline. Respiratory:  Normal respiratory effort. Clear to auscultation, bilaterally without Rales/Wheezes/Rhonchi. Cardiovascular: Regular rate and rhythm with normal S1/S2 without murmurs, rubs or gallops. Abdomen: Soft, non-tender, non-distended with normal bowel sounds. Musculoskeletal: No clubbing, cyanosis. +chronic venous stasis. +chronic edema. Skin: Skin color, texture, turgor normal.  No rashes or lesions. Neurologic:  Neurovascularly intact without any focal sensory/motor deficits.  Cranial nerves: II-XII intact, grossly non-focal.  Psychiatric: Alert and oriented, thought content appropriate, normal insight      Labs:   Recent Labs     05/02/22  1058 05/02/22  1058 05/03/22  0431 05/03/22  1516 05/03/22  2037 05/04/22  0338 05/04/22  1144 WBC 5.9  --  6.6  --   --  8.7  --    HGB 7.3*   < > 6.7*   < > 7.6* 7.2* 7.3*   HCT 30.1*   < > 27.4*   < > 30.1* 29.1* 29.1*     --  248  --   --  245  --     < > = values in this interval not displayed. Recent Labs     05/02/22  1058 05/03/22  0431 05/04/22  0338    145 138   K 4.0 4.0 4.1   CL 97* 96* 92*   CO2 43* 43* 41*   BUN 24* 21 22   CREATININE 1.6* 1.7* 1.6*   CALCIUM 9.1 8.7 8.5*     No results for input(s): AST, ALT, BILIDIR, BILITOT, ALKPHOS in the last 72 hours. Recent Labs     05/02/22  1646   INR 1.5     No results for input(s): Emani Paiz in the last 72 hours. Urinalysis:      Lab Results   Component Value Date    NITRU Negative 05/02/2022    WBCUA NONE 05/02/2022    BACTERIA NONE SEEN 05/02/2022    RBCUA 0-1 05/02/2022    BLOODU TRACE-INTACT 05/02/2022    SPECGRAV 1.010 05/02/2022    GLUCOSEU Negative 05/02/2022       Radiology:  XR CHEST PORTABLE   Final Result   Suspect early CHF.                  Assessment/Plan:    Active Hospital Problems    Diagnosis     Hypothyroidism [E03.9]      Priority: High    Decompensated heart failure (HCC) [I50.9]      Priority: Medium    Microcytic anemia [D50.9]      Priority: Medium    CKD (chronic kidney disease) stage 3, GFR 30-59 ml/min ([facility]) [N18.30]      Priority: Medium    Hypertension [I10]      Priority: Low    ZOE (obstructive sleep apnea) [G47.33]      Priority: Low    COPD (chronic obstructive pulmonary disease) ([facility]) [J44.9]      Priority: Low    Chronic respiratory failure with hypoxia and hypercapnia ([facility]) [J96.11, J96.12]     Paroxysmal atrial fibrillation ([facility]) [I48.0]     Anticoagulated [Z79.01]     Gastrointestinal hemorrhage [K92.2]      Decompensated heart failure on EF 55% on echocardiogram on 6/19/2020  -Continue IV diuretics with caution for low blood pressure  -Echocardiogram   -Daily weights  -Strict intake and output  -Consulted cardiology    Acute gastrointestinal bleed with associated blood loss anemia   -Hgb 7.3 on presentation. Transfused PRBC on 5/3/22. Xarelto on hold. -Iron deficient. Iron supplementon discharge.  -Serial H&H   -Consulted general surgery. EGD planned today. Continue PPI. Acute on chronic hypoxic respiratory failure-due to CHF  -Currently on 6L NC. Pt is on 4L NC at home. CKD stage 3  -Cr stable    COPD  -Continue inhalers    Hypertension  -BP borderline, will monitor    Hypothyroidism  -Continue Synthroid    History of sleep apnea  -BiPAP    Deconditioning   -PT/OT      DVT Prophylaxis: Xarelto on hold. SCDs.   Diet: Diet NPO Exceptions are: Sips of Water with Meds  Code Status: Full Code    PT/OT Eval Status: as above    Dispo - Home in 1-2day    Gurmeet Sanchez MD

## 2022-05-04 NOTE — PROGRESS NOTES
Floor Called, nurse to nurse given. Spoke with [employer] . Patients test results review, VS reported to receiving nurse. Any and all important information regarding patient disclosed.

## 2022-05-04 NOTE — ANESTHESIA POSTPROCEDURE EVALUATION
Department of Anesthesiology  Postprocedure Note    Patient: Keith Landry  MRN: [id number]  YOB: 1945  Date of evaluation: 5/4/2022  Time:  4:10 PM     Procedure Summary     Date: 05/04/22 Room / Location: [address] [place] [address] 01 / [facility]    Anesthesia Start: 7161 Anesthesia Stop: 7524    Procedure: EGD BIOPSY (N/A ) Diagnosis: (anemia)    Surgeons: Patricia Arriaza MD Responsible Provider: Joss Copeland MD    Anesthesia Type: MAC ASA Status: 4          Anesthesia Type: MAC    Liam Phase I:      Liam Phase II:      Last vitals: Reviewed and per EMR flowsheets.        Anesthesia Post Evaluation    Patient location during evaluation: PACU  Patient participation: complete - patient participated  Level of consciousness: awake  Pain score: 0  Airway patency: patent  Nausea & Vomiting: no nausea and no vomiting  Complications: no  Cardiovascular status: blood pressure returned to baseline  Respiratory status: acceptable  Hydration status: stable

## 2022-05-04 NOTE — PROGRESS NOTES
INPATIENT CARDIOLOGY FOLLOW-UP    Name: Kaylah Sandoval    Age: 78 y.o. Date of Admission: 5/2/2022 10:30 AM    Date of Service: 5/4/2022    [facility]    Chief Complaint: Follow-up for CHF    Interim History:  Feeling okay. Denies shortness of breath. Scrotal swelling improved. Net -2.8 L.     Scheduled for EGD today    Review of Systems:   Negative except as described above    Problem List:  Patient Active Problem List   Diagnosis    Hypertension    ZOE (obstructive sleep apnea)    [facility]omy[facility]    [facility]    Hypothyroidism    Chronic respiratory failure with hypoxia and hypercapnia ([facility])    Congestive heart failure (HCC)    [facility]    Anticoagulated    Gastrointestinal hemorrhage    Atrial fibrillation, chronic ([facility])    Pericardial effusion    JUDITH [facility]) [facility]    Acute on chronic diastolic heart failure (HCC)    Acute on chronic respiratory failure (HCC)    Decompensated heart failure ([facility])    Microcytic anemia    CKD (chronic kidney disease) stage 3, GFR 30-59 ml/min ([facility])       Current Medications:    Current Facility-Administered Medications:     0.9 % sodium chloride infusion, , IntraVENous, PRN, Renita Escobedo DO    bumetanide (BUMEX) injection 2 mg, 2 mg, IntraVENous, BID, Renita Segal PA-C, 2 mg at 05/03/22 2222    metoprolol succinate (TOPROL XL) extended release tablet 50 mg, 50 mg, Oral, BID, Renita Segal PA-C, 50 mg at 05/03/22 2306    albuterol (PROVENTIL) nebulizer solution 2.5 mg, 2.5 mg, Nebulization, Q6H PRN, Carolee Charlton MD    Budeson-Glycopyrrol-Formoterol 160-9-4.8 MCG/ACT AERO 2 puff, 2 puff, Inhalation, BID, Carolee Charlton MD, 2 puff at 05/03/22 2311    levothyroxine (SYNTHROID) tablet 200 mcg, 200 mcg, Oral, Daily, Carolee Charlton MD, 200 mcg at 05/04/22 0630    [Held by provider] rivaroxaban (XARELTO) tablet 15 mg, 15 mg, Oral, Dinner, David Arana MD    sodium chloride (OCEAN, BABY AYR) 0.65 % nasal spray 1 spray, 1 spray, Nasal, Daily, David Arana MD, 1 spray at 05/03/22 0914    doxazosin (CARDURA) tablet 2 mg, 2 mg, Oral, Daily, David Arana MD, 2 mg at 05/03/22 6191    Vitamin D (CHOLECALCIFEROL) tablet 1,000 Units, 1,000 Units, Oral, Daily, David Arana MD, 1,000 Units at 05/03/22 0914    sodium chloride flush 0.9 % injection 5-40 mL, 5-40 mL, IntraVENous, 2 times per day, David Arana MD, 10 mL at 05/03/22 2320    sodium chloride flush 0.9 % injection 10 mL, 10 mL, IntraVENous, PRN, David Arana MD    0.9 % sodium chloride infusion, , IntraVENous, PRN, David Arana MD    ondansetron (ZOFRAN-ODT) disintegrating tablet 4 mg, 4 mg, Oral, Q8H PRN **OR** ondansetron (ZOFRAN) injection 4 mg, 4 mg, IntraVENous, Q6H PRN, David Arana MD    magnesium hydroxide (MILK OF MAGNESIA) 400 MG/5ML suspension 30 mL, 30 mL, Oral, Daily PRN, David Arana MD    acetaminophen (TYLENOL) tablet 650 mg, 650 mg, Oral, Q6H PRN **OR** acetaminophen (TYLENOL) suppository 650 mg, 650 mg, Rectal, Q6H PRN, David Arana MD    pantoprazole (PROTONIX) injection 40 mg, 40 mg, IntraVENous, BID, David Arana MD, 40 mg at 05/03/22 2306    fluticasone (FLONASE) 50 MCG/ACT nasal spray 2 spray, 2 spray, Nasal, Daily, VALERIA Brunner CNP, 2 spray at 05/03/22 1058    levothyroxine (SYNTHROID) tablet 50 mcg, 50 mcg, Oral, Daily, VALERIA Brunner CNP, 50 mcg at 05/04/22 0630    Physical Exam:  /69   Pulse 101   Temp 98.1 °F (36.7 °C) (Oral)   Resp 18   Wt 290 lb 4.8 oz (131.7 kg)   SpO2 99%   BMI 39.37 kg/m²   Wt Readings from Last 3 Encounters:   05/04/22 290 lb 4.8 oz (131.7 kg)   08/13/21 270 lb (122.5 kg)   06/27/20 242 lb (109.8 kg)     Appearance: Awake, alert, on nasal cannula  Skin: Intact, no rash  Head: Normocephalic, atraumatic  Eyes: EOMI, no conjunctival erythema  ENMT: No pharyngeal erythema, MMM, no rhinorrhea  Neck: Supple, no elevated JVP, no carotid bruits  Lungs: Diminished. Cardiac: PMI nondisplaced, irregular rhythm with a normal rate, S1 & S2 normal, no murmurs  Abdomen: Soft, nontender, +bowel sounds  : Scrotal swelling improved but still present, Palom[place] catheter in place  Extremities: Moves all extremities x 4, no lower extremity edema  Neurologic: No focal motor deficits apparent, normal mood and affect  Peripheral Pulses: Intact posterior tibial pulses bilaterally    Intake/Output:    Intake/Output Summary (Last 24 hours) at 5/4/2022 0841  Last data filed at 5/4/2022 0600  Gross per 24 hour   Intake 300 ml   Output 2000 ml   Net -1700 ml     No intake/output data recorded. Laboratory Tests:  Recent Labs     05/02/22  1058 05/03/22 0431 05/04/22  0338    145 138   K 4.0 4.0 4.1   CL 97* 96* 92*   CO2 43* 43* 41*   BUN 24* 21 22   CREATININE 1.6* 1.7* 1.6*   GLUCOSE 90 82 102*   CALCIUM 9.1 8.7 8.5*     Lab Results   Component Value Date    MG 2.2 05/04/2022     No results for input(s): ALKPHOS, ALT, AST, PROT, BILITOT, BILIDIR, LABALBU in the last 72 hours. Recent Labs     05/02/22  1058 05/02/22  1058 05/03/22  0431 05/03/22  0431 05/03/22  1516 05/03/22 2037 05/04/22  0338   WBC 5.9  --  6.6  --   --   --  8.7   RBC 4.12  --  3.72*  --   --   --  3.96   HGB 7.3*   < > 6.7*   < > 7.4* 7.6* 7.2*   HCT 30.1*   < > 27.4*   < > 29.9* 30.1* 29.1*   MCV 73.1*  --  73.7*  --   --   --  73.5*   MCH 17.7*  --  18.0*  --   --   --  18.2*   MCHC 24.3*  --  24.5*  --   --   --  24.7*   RDW 25.2*  --  25.1*  --   --   --  24.7*     --  248  --   --   --  245   MPV 9.8  --  10.1  --   --   --  9.6    < > = values in this interval not displayed.      Lab Results   Component Value Date    TROPONINI <0.01 06/19/2020    TROPONINI <0.01 06/19/2020    TROPONINI <0.01 12/07/2018     Lab Results   Component Value Date    INR 1.5 05/02/2022 INR 1.3 2018    INR 1.1 2016    PROTIME 17.1 (H) 2022    PROTIME 15.5 (H) 2018    PROTIME 12.1 2016     Lab Results   Component Value Date    TSH 1.090 2020     Lab Results   Component Value Date    LABA1C 6.3 (H) 2016     No results found for: EAG  Lab Results   Component Value Date    CHOL 91 2022    CHOL 116 2016     Lab Results   Component Value Date    TRIG 49 2022    TRIG 51 2016     Lab Results   Component Value Date    HDL 37 2022    HDL 51 2016     Lab Results   Component Value Date    LDLCALC 44 2022    LDLCALC 55 2016     Lab Results   Component Value Date    LABVLDL 10 2022    LABVLDL 10 2016     No results found for: CHOLHDLRATIO  Recent Labs     22  1058 22  0338   PROBNP 2,562* 3,030*       Cardiac Tests:    EK2022: Atrial fibrillation 90 beats minute. Normal axis. Incomplete right bundle branch block QRS duration 108 ms. PVC or aberrant complex. Possible septal infarct    Telemetry: Atrial fibrillation 90s to 100s, NSVT    Chest X-ray:       Impression   Suspect early CHF. Echocardiogram:   Transthoracic echo 5/3/2022   Summary   Technically difficult study - limited visualization. Atrial fibrillation noted. Micro-bubble contrast injected to enhance left ventricular visualization. Normal left ventricular size and systolic function. Ejection fraction is visually estimated at 60-65%. Indeterminate diastolic function. No regional wall motion abnormalities seen. Severe left ventricular concentric hypertrophy noted. Severely dilated right ventricle with reduced function. Biatrial dilation. No significant valvular abnormalities. Stress test:      Cardiac catheterization:   [facility] ([facility] ): Pressures --> RA 12, PCWP 24, PA 55/27 mean 38, RV 55/9. CO 6.82. CI 2.64.   · Cardiac catheterization at the [facility] in  with angiographically normal coronaries (report under Media)    ----------------------------------------------------------------------------------------------------------------------------------------------------------------  IMPRESSION:  1. Acute on chronic HFpEF/right heart failure. proBNP 3000. Net -2.8 L  2. Acute on chronic hypoxic/hypercapnic respiratory failure  3. Longstanding persistent atrial fibrillation (likely permanent). History of atrial flutter. AC with dose adjusted rivaroxaban currently held  4. 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 Ascending TAA 4 x 4 cm 2018  6. Melena, acute on chronic anemia suspected GI bleeding. Hemoglobin 6.7 -> 7.2 posttransfusion  7. History of prior GI bleeding 2018 PUD/gastritis  8. CKD creatinine 1.6  9. ZOE, noncompliant  10. Severe COPD  11. Hypothyroidism  12. Morbid obesity    RECOMMENDATIONS:     Continue IV diuresis   Continue metoprolol succinate   Rivaroxaban on hold   Scheduled for EGD today   Aggressive risk factor modification    Melani Zaragoza MD, [address] [facility]    NOTE: This report was transcribed using voice recognition software. Every effort was made to ensure accuracy; however, inadvertent computerized transcription errors may be present.

## 2022-05-04 NOTE — OP NOTE
EGD Op Note    DATE OF PROCEDURE: 5/4/2022     SURGEON: Belem Felder MD    PREOPERATIVE DIAGNOSIS: anemia    POSTOPERATIVE DIAGNOSIS: Same superficial antral ulcerations,     OPERATION: Procedure(s):  EGD BIOPSY    ANESTHESIA: Local monitored anesthesia. ESTIMATED BLOOD LOSS: minimal    COMPLICATIONS: None. SPECIMENS:    ID Type Source Tests Collected by Time Destination   A : gastric antral biopsies r/o H pylori Gastric Gastric SURGICAL PATHOLOGY Belem Felder MD 5/4/2022 1606        HISTORY: The patient is a 78y.o. year old male with history of above preop diagnosis. I recommended esophagogastroduodenoscopy with possible biopsy and I explained the risk, benefits, expected outcome, and alternatives to the procedure. Risks included but are not limited to bleeding, infection, respiratory distress, hypotension, and perforation of the esophagus, stomach, or duodenum. Patient understands and is in agreement. PROCEDURE: The patient was given IV conscious sedation per anesthesia. The patient was given supplemental oxygen by nasal cannula. The gastroscope was inserted orally and advanced under direct vision through the esophagus, through the stomach, through the pylorus, and into the duodenum. Findings:  Duodenum:     Descending: wnl     Bulb: wnl    Stomach:    Antrum: superficial antral ulcers bx x2 cold forceps    Body: wnl     Fundus: wnl     Esophagus: normal   GE junction:    Larynx: not examined    The scope was removed and the patient tolerated the procedure well. IMPRESSION/PLAN:   1. Superficial antral ulcers.         Belem Felder MD  05/04/22  4:08 PM

## 2022-05-05 ENCOUNTER — APPOINTMENT (OUTPATIENT)
Dept: GENERAL RADIOLOGY | Age: 77
DRG: 291 | End: 2022-05-05
Payer: OTHER GOVERNMENT

## 2022-05-05 LAB
ANION GAP SERPL CALCULATED.3IONS-SCNC: 4 MMOL/L (ref 7–16)
ANISOCYTOSIS: ABNORMAL
BASOPHILS ABSOLUTE: 0 E9/L (ref 0–0.2)
BASOPHILS RELATIVE PERCENT: 0.2 % (ref 0–2)
BUN BLDV-MCNC: 23 MG/DL (ref 6–23)
CALCIUM SERPL-MCNC: 8.6 MG/DL (ref 8.6–10.2)
CHLORIDE BLD-SCNC: 94 MMOL/L (ref 98–107)
CO2: 42 MMOL/L (ref 22–29)
CREAT SERPL-MCNC: 1.7 MG/DL (ref 0.7–1.2)
EOSINOPHILS ABSOLUTE: 0.08 E9/L (ref 0.05–0.5)
EOSINOPHILS RELATIVE PERCENT: 0.9 % (ref 0–6)
GFR AFRICAN AMERICAN: 48
GFR NON-AFRICAN AMERICAN: 39 ML/MIN/1.73
GLUCOSE BLD-MCNC: 151 MG/DL (ref 74–99)
HCT VFR BLD CALC: 28.2 % (ref 37–54)
HCT VFR BLD CALC: 28.6 % (ref 37–54)
HEMOGLOBIN: 7.1 G/DL (ref 12.5–16.5)
HEMOGLOBIN: 7.2 G/DL (ref 12.5–16.5)
HYPOCHROMIA: ABNORMAL
LYMPHOCYTES ABSOLUTE: 0.36 E9/L (ref 1.5–4)
LYMPHOCYTES RELATIVE PERCENT: 4.3 % (ref 20–42)
MAGNESIUM: 2.1 MG/DL (ref 1.6–2.6)
MCH RBC QN AUTO: 18.6 PG (ref 26–35)
MCHC RBC AUTO-ENTMCNC: 25.2 % (ref 32–34.5)
MCV RBC AUTO: 73.7 FL (ref 80–99.9)
MONOCYTES ABSOLUTE: 0.73 E9/L (ref 0.1–0.95)
MONOCYTES RELATIVE PERCENT: 7.8 % (ref 2–12)
NEUTROPHILS ABSOLUTE: 7.92 E9/L (ref 1.8–7.3)
NEUTROPHILS RELATIVE PERCENT: 87 % (ref 43–80)
NUCLEATED RED BLOOD CELLS: 0.9 /100 WBC
OVALOCYTES: ABNORMAL
PDW BLD-RTO: 25.4 FL (ref 11.5–15)
PLATELET # BLD: 270 E9/L (ref 130–450)
PMV BLD AUTO: 10.3 FL (ref 7–12)
POIKILOCYTES: ABNORMAL
POLYCHROMASIA: ABNORMAL
POTASSIUM SERPL-SCNC: 4.1 MMOL/L (ref 3.5–5)
RBC # BLD: 3.88 E12/L (ref 3.8–5.8)
SODIUM BLD-SCNC: 140 MMOL/L (ref 132–146)
STOMATOCYTES: ABNORMAL
WBC # BLD: 9.1 E9/L (ref 4.5–11.5)

## 2022-05-05 PROCEDURE — 2700000000 HC OXYGEN THERAPY PER DAY

## 2022-05-05 PROCEDURE — 80048 BASIC METABOLIC PNL TOTAL CA: CPT

## 2022-05-05 PROCEDURE — 99233 SBSQ HOSP IP/OBS HIGH 50: CPT | Performed by: INTERNAL MEDICINE

## 2022-05-05 PROCEDURE — 97161 PT EVAL LOW COMPLEX 20 MIN: CPT

## 2022-05-05 PROCEDURE — 94660 CPAP INITIATION&MGMT: CPT

## 2022-05-05 PROCEDURE — 2500000003 HC RX 250 WO HCPCS: Performed by: SURGERY

## 2022-05-05 PROCEDURE — 6370000000 HC RX 637 (ALT 250 FOR IP): Performed by: SURGERY

## 2022-05-05 PROCEDURE — 6360000002 HC RX W HCPCS: Performed by: INTERNAL MEDICINE

## 2022-05-05 PROCEDURE — 6370000000 HC RX 637 (ALT 250 FOR IP)

## 2022-05-05 PROCEDURE — 6370000000 HC RX 637 (ALT 250 FOR IP): Performed by: INTERNAL MEDICINE

## 2022-05-05 PROCEDURE — 97166 OT EVAL MOD COMPLEX 45 MIN: CPT

## 2022-05-05 PROCEDURE — 73070 X-RAY EXAM OF ELBOW: CPT

## 2022-05-05 PROCEDURE — 2140000000 HC CCU INTERMEDIATE R&B

## 2022-05-05 PROCEDURE — 85025 COMPLETE CBC W/AUTO DIFF WBC: CPT

## 2022-05-05 PROCEDURE — 36415 COLL VENOUS BLD VENIPUNCTURE: CPT

## 2022-05-05 PROCEDURE — 2500000003 HC RX 250 WO HCPCS: Performed by: PHYSICIAN ASSISTANT

## 2022-05-05 PROCEDURE — 6370000000 HC RX 637 (ALT 250 FOR IP): Performed by: STUDENT IN AN ORGANIZED HEALTH CARE EDUCATION/TRAINING PROGRAM

## 2022-05-05 PROCEDURE — 97530 THERAPEUTIC ACTIVITIES: CPT

## 2022-05-05 PROCEDURE — 6370000000 HC RX 637 (ALT 250 FOR IP): Performed by: PHYSICIAN ASSISTANT

## 2022-05-05 PROCEDURE — 2580000003 HC RX 258: Performed by: SURGERY

## 2022-05-05 PROCEDURE — C9113 INJ PANTOPRAZOLE SODIUM, VIA: HCPCS | Performed by: INTERNAL MEDICINE

## 2022-05-05 PROCEDURE — 85014 HEMATOCRIT: CPT

## 2022-05-05 PROCEDURE — 2580000003 HC RX 258: Performed by: INTERNAL MEDICINE

## 2022-05-05 PROCEDURE — 83735 ASSAY OF MAGNESIUM: CPT

## 2022-05-05 PROCEDURE — 85018 HEMOGLOBIN: CPT

## 2022-05-05 PROCEDURE — 73090 X-RAY EXAM OF FOREARM: CPT

## 2022-05-05 PROCEDURE — A4216 STERILE WATER/SALINE, 10 ML: HCPCS | Performed by: INTERNAL MEDICINE

## 2022-05-05 RX ORDER — SUCRALFATE 1 G/1
1 TABLET ORAL EVERY 6 HOURS SCHEDULED
Status: DISCONTINUED | OUTPATIENT
Start: 2022-05-05 | End: 2022-05-12 | Stop reason: HOSPADM

## 2022-05-05 RX ORDER — POLYETHYLENE GLYCOL 3350 17 G/17G
17 POWDER, FOR SOLUTION ORAL DAILY
Status: DISCONTINUED | OUTPATIENT
Start: 2022-05-05 | End: 2022-05-12 | Stop reason: HOSPADM

## 2022-05-05 RX ADMIN — METOPROLOL SUCCINATE 50 MG: 50 TABLET, EXTENDED RELEASE ORAL at 21:41

## 2022-05-05 RX ADMIN — SALINE NASAL SPRAY 1 SPRAY: 1.5 SOLUTION NASAL at 10:05

## 2022-05-05 RX ADMIN — LEVOTHYROXINE SODIUM 200 MCG: 0.2 TABLET ORAL at 05:25

## 2022-05-05 RX ADMIN — BUMETANIDE 2 MG: 0.25 INJECTION INTRAMUSCULAR; INTRAVENOUS at 21:41

## 2022-05-05 RX ADMIN — LEVOTHYROXINE SODIUM 50 MCG: 0.2 TABLET ORAL at 05:23

## 2022-05-05 RX ADMIN — ACETAMINOPHEN 650 MG: 325 TABLET ORAL at 14:23

## 2022-05-05 RX ADMIN — DOXAZOSIN 2 MG: 2 TABLET ORAL at 09:52

## 2022-05-05 RX ADMIN — BUMETANIDE 2 MG: 0.25 INJECTION INTRAMUSCULAR; INTRAVENOUS at 09:52

## 2022-05-05 RX ADMIN — SODIUM CHLORIDE 40 MG: 9 INJECTION, SOLUTION INTRAMUSCULAR; INTRAVENOUS; SUBCUTANEOUS at 21:41

## 2022-05-05 RX ADMIN — SUCRALFATE 1 G: 1 TABLET ORAL at 17:05

## 2022-05-05 RX ADMIN — Medication 10 ML: at 09:53

## 2022-05-05 RX ADMIN — POLYETHYLENE GLYCOL 3350 17 G: 17 POWDER, FOR SOLUTION ORAL at 10:06

## 2022-05-05 RX ADMIN — METOPROLOL SUCCINATE 50 MG: 50 TABLET, EXTENDED RELEASE ORAL at 09:52

## 2022-05-05 RX ADMIN — Medication 10 ML: at 21:42

## 2022-05-05 RX ADMIN — SUCRALFATE 1 G: 1 TABLET ORAL at 14:26

## 2022-05-05 RX ADMIN — FLUTICASONE PROPIONATE 2 SPRAY: 50 SPRAY, METERED NASAL at 10:05

## 2022-05-05 RX ADMIN — Medication 1000 UNITS: at 09:52

## 2022-05-05 RX ADMIN — PANTOPRAZOLE SODIUM 40 MG: 40 INJECTION, POWDER, FOR SOLUTION INTRAVENOUS at 09:52

## 2022-05-05 ASSESSMENT — PAIN SCALES - GENERAL
PAINLEVEL_OUTOF10: 0
PAINLEVEL_OUTOF10: 6
PAINLEVEL_OUTOF10: 3

## 2022-05-05 ASSESSMENT — PAIN DESCRIPTION - ORIENTATION
ORIENTATION: RIGHT
ORIENTATION: RIGHT

## 2022-05-05 ASSESSMENT — PAIN - FUNCTIONAL ASSESSMENT: PAIN_FUNCTIONAL_ASSESSMENT: PREVENTS OR INTERFERES SOME ACTIVE ACTIVITIES AND ADLS

## 2022-05-05 ASSESSMENT — PAIN DESCRIPTION - LOCATION
LOCATION: ARM
LOCATION: ARM

## 2022-05-05 ASSESSMENT — PAIN DESCRIPTION - ONSET: ONSET: ON-GOING

## 2022-05-05 ASSESSMENT — PAIN SCALES - WONG BAKER: WONGBAKER_NUMERICALRESPONSE: 0

## 2022-05-05 ASSESSMENT — PAIN DESCRIPTION - FREQUENCY: FREQUENCY: CONTINUOUS

## 2022-05-05 NOTE — PROGRESS NOTES
The patient is having increased complaints of pain in his right elbow, forearm, and forarm. Dr. Kristie Gandhi made aware and x-rays have been ordered at this time.

## 2022-05-05 NOTE — PROGRESS NOTES
INPATIENT CARDIOLOGY FOLLOW-UP    Name: Garrison Rivera    Age: 78 y.o. Date of Admission: 5/2/2022 10:30 AM    Date of Service: 5/5/2022    [facility]    Chief Complaint: Follow-up for CHF    Interim History:  Feeling okay. Denies shortness of breath. Scrotal swelling persistent but improved. Net -4.7 L.     EGD yesterday showing superficial antral ulcers    Review of Systems:   Negative except as described above    Problem List:  Patient Active Problem List   Diagnosis    Hypertension    ZOE (obstructive sleep apnea)    [facility]    COPD (chronic obstructive pulmonary disease) ([facility]    Hypothyroidism    Chronic respiratory failure with hypoxia and hypercapnia ([facility])    Congestive heart failure ([facility])    Paroxysmal atrial fibrillation ([facility]    Anticoagulated    Gastrointestinal hemorrhage    Atrial fibrillation, chronic ([facility])    Pericardial effusion    JUDITH (acute kidney injury) ([facility]    Acute on chronic diastolic heart failure (HCC)    Acute on chronic respiratory failure (HCC)    Decompensated heart failure ([facility]    Microcytic anemia    CKD (chronic kidney disease) stage 3, GFR 30-59 ml/min ([facility])       Current Medications:    Current Facility-Administered Medications:     polyethylene glycol (GLYCOLAX) packet 17 g, 17 g, Oral, Daily, Jose Ramon Valles MD    0.9 % sodium chloride infusion, , IntraVENous, PRN, [place] Hand, DO    bumetanide (BUMEX) injection 2 mg, 2 mg, IntraVENous, BID, [facility] NICOLE Segal, 2 mg at 05/04/22 2247    metoprolol succinate (TOPROL XL) extended release tablet 50 mg, 50 mg, Oral, BID, [facility] NICOLE Segal, 50 mg at 05/04/22 2247    albuterol (PROVENTIL) nebulizer solution 2.5 mg, 2.5 mg, Nebulization, Q6H PRN, Omega Neal MD    Budeson-Glycopyrrol-Formoterol 160-9-4.8 MCG/ACT AERO 2 puff, 2 puff, Inhalation, BID, Omega Neal MD, 2 puff at 05/04/22 2254    levothyroxine (SYNTHROID) tablet 200 mcg, 200 mcg, Oral, Daily, Marlena Castillo MD, 200 mcg at 05/05/22 0525    [Held by provider] rivaroxaban (XARELTO) tablet 15 mg, 15 mg, Oral, Dinner, Marlena Castillo MD    sodium chloride (OCEAN, BABY AYR) 0.65 % nasal spray 1 spray, 1 spray, Nasal, Daily, Marlena Castillo MD, 1 spray at 05/03/22 0914    doxazosin (CARDURA) tablet 2 mg, 2 mg, Oral, Daily, Marlena Castillo MD, 2 mg at 05/03/22 7583    Vitamin D (CHOLECALCIFEROL) tablet 1,000 Units, 1,000 Units, Oral, Daily, Marlena Castillo MD, 1,000 Units at 05/03/22 0914    sodium chloride flush 0.9 % injection 5-40 mL, 5-40 mL, IntraVENous, 2 times per day, Marlena Castillo MD, 10 mL at 05/04/22 2247    sodium chloride flush 0.9 % injection 10 mL, 10 mL, IntraVENous, PRN, Marlena Castillo MD    0.9 % sodium chloride infusion, , IntraVENous, PRN, Marlena Castillo MD    ondansetron (ZOFRAN-ODT) disintegrating tablet 4 mg, 4 mg, Oral, Q8H PRN **OR** ondansetron (ZOFRAN) injection 4 mg, 4 mg, IntraVENous, Q6H PRN, Marlena Castillo MD    magnesium hydroxide (MILK OF MAGNESIA) 400 MG/5ML suspension 30 mL, 30 mL, Oral, Daily PRN, Marlena Castillo MD    acetaminophen (TYLENOL) tablet 650 mg, 650 mg, Oral, Q6H PRN **OR** acetaminophen (TYLENOL) suppository 650 mg, 650 mg, Rectal, Q6H PRN, Marlena Castillo MD    pantoprazole (PROTONIX) injection 40 mg, 40 mg, IntraVENous, BID, Marlena Castillo MD, 40 mg at 05/04/22 2248    fluticasone (FLONASE) 50 MCG/ACT nasal spray 2 spray, 2 spray, Nasal, Daily, Zaira Fabian, APRN - CNP, 2 spray at 05/03/22 1058    levothyroxine (SYNTHROID) tablet 50 mcg, 50 mcg, Oral, Daily, Zaira Fabian, APRN - CNP, 50 mcg at 05/05/22 0523    Physical Exam:  /73   Pulse 103   Temp 97 °F (36.1 °C) (Oral)   Resp 22   Wt 285 lb 9.6 oz (129.5 kg)   SpO2 99%   BMI 38.73 kg/m²   Wt Readings from Last 3 Encounters:   05/05/22 285 lb 9.6 oz (129.5 kg)   08/13/21 270 lb (122.5 kg)   06/27/20 242 lb (109.8 kg)     Appearance: Awake, alert, on nasal cannula  Skin: Intact, no rash  Head: Normocephalic, atraumatic  Eyes: EOMI, no conjunctival erythema  ENMT: No pharyngeal erythema, MMM, no rhinorrhea  Neck: Supple, no elevated JVP, no carotid bruits  Lungs: Diminished. Cardiac: PMI nondisplaced, irregular rhythm with a normal rate, S1 & S2 normal, no murmurs  Abdomen: Soft, distended, nontender, +bowel sounds  : Scrotal swelling improved but still present, scrotum about the size of a large grapefruit, [place]om[place] catheter in place  Extremities: Moves all extremities x 4, no lower extremity edema  Neurologic: No focal motor deficits apparent, normal mood and affect  Peripheral Pulses: Intact posterior tibial pulses bilaterally    Intake/Output:    Intake/Output Summary (Last 24 hours) at 5/5/2022 0836  Last data filed at 5/5/2022 0300  Gross per 24 hour   Intake 100 ml   Output 2040 ml   Net -1940 ml     No intake/output data recorded. Laboratory Tests:  Recent Labs     05/03/22  0431 05/04/22 0338 05/05/22 0455    138 140   K 4.0 4.1 4.1   CL 96* 92* 94*   CO2 43* 41* 42*   BUN 21 22 23   CREATININE 1.7* 1.6* 1.7*   GLUCOSE 82 102* 151*   CALCIUM 8.7 8.5* 8.6     Lab Results   Component Value Date    MG 2.1 05/05/2022     No results for input(s): ALKPHOS, ALT, AST, PROT, BILITOT, BILIDIR, LABALBU in the last 72 hours.   Recent Labs     05/03/22  0431 05/03/22  1516 05/04/22  0338 05/04/22  0338 05/04/22  1144 05/04/22 2053 05/05/22 0455   WBC 6.6  --  8.7  --   --   --  9.1   RBC 3.72*  --  3.96  --   --   --  3.88   HGB 6.7*   < > 7.2*   < > 7.3* 7.1* 7.2*  7.1*   HCT 27.4*   < > 29.1*   < > 29.1* 28.0* 28.6*  28.2*   MCV 73.7*  --  73.5*  --   --   --  73.7*   MCH 18.0*  --  18.2*  --   --   --  18.6*   MCHC 24.5*  --  24.7*  --   --   --  25.2*   RDW 25.1*  --  24.7*  --   --   --  25.4*     --  245  --   --   --  270   MPV 10.1  --  9.6  --   --   --  10.3    < > = values in this interval not displayed. Lab Results   Component Value Date    TROPONINI <0.01 2020    TROPONINI <0.01 2020    TROPONINI <0.01 2018     Lab Results   Component Value Date    INR 1.5 2022    INR 1.3 2018    INR 1.1 2016    PROTIME 17.1 (H) 2022    PROTIME 15.5 (H) 2018    PROTIME 12.1 2016     Lab Results   Component Value Date    TSH 1.090 2020     Lab Results   Component Value Date    LABA1C 6.3 (H) 2016     No results found for: EAG  Lab Results   Component Value Date    CHOL 91 2022    CHOL 116 2016     Lab Results   Component Value Date    TRIG 49 2022    TRIG 51 2016     Lab Results   Component Value Date    HDL 37 2022    HDL 51 2016     Lab Results   Component Value Date    LDLCALC 44 2022    LDLCALC 55 2016     Lab Results   Component Value Date    LABVLDL 10 2022    LABVLDL 10 2016     No results found for: CHOLHDLRATIO  Recent Labs     22  1058 22  0338   PROBNP 2,562* 3,030*       Cardiac Tests:    EK2022: Atrial fibrillation 90 beats minute. Normal axis. Incomplete right bundle branch block QRS duration 108 ms. PVC or aberrant complex. Possible septal infarct    Telemetry: Atrial fibrillation 90s to 100s, NSVT    Chest X-ray:       Impression   Suspect early CHF. Echocardiogram:   Transthoracic echo 5/3/2022   Summary   Technically difficult study - limited visualization. Atrial fibrillation noted. Micro-bubble contrast injected to enhance left ventricular visualization. Normal left ventricular size and systolic function. Ejection fraction is visually estimated at 60-65%. Indeterminate diastolic function. No regional wall motion abnormalities seen. Severe left ventricular concentric hypertrophy noted. Severely dilated right ventricle with reduced function. Biatrial dilation. No significant valvular abnormalities.     Stress test: Cardiac catheterization:   [facility] ([facility], [facility]): Pressures --> RA 12, PCWP 24, PA 55/27 mean 38, RV 55/9. CO 6.82. CI 2.64. · Cardiac [facility]ization at the [facility] in 2013 with angiographically normal coronaries (report under Media)    ----------------------------------------------------------------------------------------------------------------------------------------------------------------  IMPRESSION:  1. Acute on chronic HFpEF/right heart failure. proBNP 3000. Net - 4.7 L  2. Acute on chronic hypoxic/hypercapnic respiratory failure  3. Longstanding persistent atrial fibrillation (likely permanent). History of atrial flutter. AC with dose adjusted rivaroxaban currently held  4. 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 Ascending TAA 4 x 4 cm 2018  6. Melena, acute on chronic anemia suspected GI bleeding. Hemoglobin 6.7 -> 7.2 posttransfusion. EGD showing antral ulcers  7. History of prior GI bleeding 2018 PUD/gastritis  8. CKD creatinine 1.6-1.7  9. ZOE, noncompliant  10. Severe COPD  11. Hypothyroidism  12. Morbid obesity    RECOMMENDATIONS:  Improving, remains volume overloaded.  Continue IV diuresis   Continue metoprolol succinate   Rivaroxaban on hold   Aggressive risk factor modification   Further care per primary and other consultants    Francisca Velasquez MD, [address] [place] [address] Cardiology    NOTE: This report was transcribed using voice recognition software. Every effort was made to ensure accuracy; however, inadvertent computerized transcription errors may be present.

## 2022-05-05 NOTE — PROGRESS NOTES
Mild gastritis very superficial ulcerations, ok for anticoagulation, will FU in office as needed    Call surgery if any issues.     Tima Calderon MD

## 2022-05-05 NOTE — PROGRESS NOTES
OCCUPATIONAL THERAPY INITIAL EVALUATION    [address] [address] [facility]      Date:2022                                                  Patient Name: Jeni Richardson  MRN: [id number]  : 1945  Room: [address] [place] [address]    Evaluating OT: SANGEETA Palmer, OTR/L [id number]  Referring Myron Butler MD  Specific Provider Orders: OT eval and treat   Recommended Adaptive Equipment:  TBD     Diagnosis: CHF   Surgery: none   Pertinent Medical History: COPD, HTN, hypothyroidism, AAA, cardiomyopathy   Precautions:  Fall Risk, NWB RUE? (RN order x-ray due to report of an extensive amount of pain)    Assessment of current deficits   [x] Functional mobility  [x]ADLs  [x] Strength               [x]Cognition   [x] Functional transfers   [x] IADLs         [x] Safety Awareness   [x]Endurance   [] Fine Coordination              [x] Balance      [] Vision/perception   [x]Sensation    []Gross Motor Coordination  [] ROM  [] Delirium                   [] Motor Control     OT PLAN OF CARE   OT POC based on physician orders, patient diagnosis and results of clinical assessment    Frequency/Duration: 2-3 days/wk for 2 weeks PRN   Specific OT Treatment to include:   * Instruction/training on adapted ADL techniques and AE recommendations to increase functional independence within precautions       * Training on energy conservation strategies, correct breathing pattern and techniques to improve independence/tolerance for self-care routine  * Functional transfer/mobility training/DME recommendations for increased independence, safety, and fall prevention  * Patient/Family education to increase follow through with safety techniques and functional independence  * Recommendation of environmental modifications for increased safety with functional transfers/mobility and ADLs  * Therapeutic exercise to improve motor endurance, ROM, and functional strength for ADLs/functional transfers  * Therapeutic activities to facilitate/challenge dynamic balance, stand tolerance for increased safety and independence with ADLs  * Neuro-muscular re-education: facilitation of righting/equilibrium reactions, midline orientation, scapular stability/mobility, normalization of muscle tone, and facilitation of volitional active controled movement    Home Living: Pt lives alone in a 1 story home; bed/bath on main level   Bathroom setup: tub shower unit on main level, walk in shower in bsement   Equipment owned: shower chair  Prior Level of Function: IND with ADLs/IADLs; using rollator for functional mobility     Pain Level: RUE  Cognition: A&O: 3/4; Follows 1 step directions, with repetition and increased time   Memory:  fair    Sequencing:  fair    Problem solving:  fair    Judgement/safety:  fair     Functional Assessment:  AM-PAC Daily Activity Raw Score: 14/24   Initial Eval Status  Date: 5/5/22 Treatment Status  Date: STGs=LTGs  Time Frame: 10-14 days   Feeding Min A (positioning in bed)   SUP   Grooming SBA (upright in bed)   SUP   UB Dressing Max A (due to RUE pain)  SBA   LB Dressing Dep (assist with socks)  Max A    Bathing Max A (simulated)  Mod A    Toileting Max A  Mod A   Bed Mobility  Log roll: NT  Supine to sit: NT   Sit to supine: NT   Log roll Min A  Supine to sit: Min A   Sit to supine: Min A   Functional Transfers Sit to stand:NT   Stand to sit:NT  Commode: NT  MIN a   Functional Mobility NT  Min A   Balance Sitting: NT  Standing: NT     Activity Tolerance fair     Visual/  Perceptual Glasses: no             UE ROM: RUE: deferred, painful to touch  LUE: elbow flex WFL, shoulder flex grossly 160'  Strength: RUE: deferred  LUE: grossly 4/5   Strength: R poor  Fine Motor Coordination:  fair    Hearing: WFL  Sensation:  No c/o numbness/tingling   Tone:  WFL  Edema: BLEs                            Comments:Cleared by RN to see pt. Upon arrival, patient supine in bed and agreeable to OT session.  At end of session, patient upright in bed with call light and phone within reach, all lines and tubes intact. Pt would benefit from continued OT to increase functional independence and quality of life. Treatment: Pt appeared latent with following commands and answering questions. Pt required vc's and physical assist for proper technique/safety with hand placement/body mechanics/posture for bed mobility/ADLs. Pt required vc's for sequencing/initiation of ADLs/bed mobility. Pt required increased time to complete ADLs/bed mobility due to rest breaks. Pt instructed on use of call light for assistance and fall prevention. Pt demo'ing fair understanding of education provided. Continue to educate. Eval Complexity: moderate  · History: Expanded chart review of medical records and additional review of physical, cognitive, or psychosocial history related to current functional performance  · Exam: 3+ performance deficits  · Assistance/Modification: mod/max assistance or modifications required to perform tasks. May have comorbidities that affect occupational performance. Rehab Potential: Good for established goals, pt. assisted in establishment of goals. LTG: maximize independence with ADLs to return to PLOF    Patient  instructed on diagnosis, prognosis/goals and plan of care. Demonstrated fair understanding. [] Malnutrition indicators have been identified and nursing has been notified to ensure a dietitian consult is ordered. Evaluation time includes thorough review of current medical information, gathering information on past medical & social history & PLOF, completion of standardized testing, informal observation of tasks, consultation with other medical professions/disciplines, assessment of data & development of POC/goals.      Time In: 1030       Time Out: 1040     Total treatment time: 0       Treatment Charges: Mins Units   OT Eval Low 97538     OT Eval Medium 64848 X    OT Eval High 42240     OT Re-Eval [id number]     Ther Ex  [id number]       Manual Therapy Elliott Tesfaye [phone]       ADL/Home Mgt [id number]     Neuro Re-ed [id number]       Group Therapy        Orthotic manage/training  35772       Non-Billable Time           Esteban Goodman, OTR/L [id number]

## 2022-05-05 NOTE — PROGRESS NOTES
Physical Therapy  Physical Therapy Initial Assessment     Name: Opal Chu  : 1945  MRN: [id number]      Date of Service: 2022    Evaluating PT:  Paris Castillo PT, ISAAC [id number]     Room #:  7518/0043-O  Diagnosis:  Acute congestive heart failure, unspecified heart failure type ([facility] [I50.9]  Decompensated heart failure ([facility] [I50.9]  Reason for admission: SOB   Precautions:  Falls, O2, RUE pain/weakness  Procedure/Surgery:   EGD biopsy  Equipment Recommendations:  TBD     SUBJECTIVE:  Pt lives alone in a 1 story home with 4 JAD 1 rail. Basement shower with 7 steps 1 rail down. Pt ambulated with no AD PTA. 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     OBJECTIVE:   Initial Evaluation  Date:  Treatment   Short Term/ Long Term   Goals   AM-PAC 6 Clicks      Was pt agreeable to Eval/treatment? Yes      Does pt have pain? RUE with movement or touch. Located at elbow      Bed Mobility  Rolling: NT  Supine to sit: ModA  Sit to supine: MaxA  Scooting: MaxA  SBA   Transfers Sit to stand: ModA<>MaxA  Stand to sit: ModA  Stand pivot: ModA no AD  SBA   Ambulation    side steps no AD ModA  &  3 ft to bedside commode no AD ModA  >25 feet with AAD SBA   Stair negotiation: ascended and descended  NT  TBD     LE ROM: WFL    LE Strength:   knee ext: 5  ankle DF: /5  -functional weakness     Balance:   Sitting static: Independent  Sitting dynamic: SBA  Standing static: ModA  Standing dynamic: ModA    -Pt is A & O x 3  -Sensation:  Pt denies numbness and tingling to extremities  -Edema:  Global increase, worst at R elbow and BLEs.      Therapeutic Exercises:  Functional activity     Patient education  Pt educated on safety, sequencing of transfers, and role of PT    Patient response to education:   Pt verbalized understanding Pt demonstrated skill Pt requires further education in this area   Yes  With assist Yes      ASSESSMENT:  Conditions Requiring Skilled Therapeutic Intervention:  []Decreased strength     []Decreased ROM  [x]Decreased functional mobility  [x]Decreased balance   [x]Decreased endurance   []Decreased posture  []Decreased sensation  []Decreased coordination   []Decreased vision  [x]Decreased safety awareness   [x]Increased pain       Comments:  Pt received supine in bed and agreeable to PT session   Pt reports feeling fatigued and deconditioned. He requires heavy assist for all mobility and expresses frustration. He quickly becomes SOB and requires rest breaks to recover. Lift assist needed from elevated bedside to fully stand -- did show some improvement on subsequent stands. Side steps taken with assist for balance and weight shifting. Pt has difficulty advancing either LE and WW does not provide much support d/t his difficulty using his RUE. Pt requested to pivot to [facility] -- assisted with pivot to L side, pt relying heavily on PT support and use of his LUE on bedside commode arm rest. Provided time to void and assisted with pivot back to bedside. Same assist required to return from [facility] to EOB. Returned to bed and repositioned. Pt with all needs met and call light in reach. Pt would benefit from continued PT POC to address functional deficits described above. Treatment:  Patient practiced and was instructed in the following treatment:     Therapeutic activity  o Patient education provided continuously throughout session for sequencing, safety maintenance, and improving any deficits found during the evaluation.   o Bed mobility training - pt given verbal and tactile cues to facilitate proper sequencing and safety during rolling and supine<>sit as well as provided with physical assistance to complete task   o Sitting EOB for >5 minutes for upright tolerance, postural awareness and BLE ROM    o STS and pivot transfer training - pt educated on proper hand and foot placement, safety and sequencing, and use of proper technique to safely complete sit<>stand and pivot transfers with hands on assistance to complete task safely. Stood x5 reps. SPT x 2 reps EOB<>BSC.   o Side stepping along EOB. Standing time total ~2-3 minutes. Pt's/ family goals   1. Get stronger     Patient and or family understand(s) diagnosis, prognosis, and plan of care. yes    Prognosis is good for reaching above PT goals. PHYSICAL THERAPY PLAN OF CARE:    PT POC is established based on physician order and patient diagnosis     Referring provider/PT Order:  05/04/22 1300   PT eval and treat Start: 05/04/22 1300, End: 05/04/22 1300, ONE TIME, Standing Count: 1 Occurrences, Jean-Paul Romero MD    Diagnosis:  Acute congestive heart failure, unspecified heart failure type ([address] [I50.9]  Decompensated heart failure ([address] [I50.9]  Specific instructions for next treatment:  Progress transfers and ambulation as able. Current Treatment Recommendations:   [] Strengthening to improve independence with functional mobility   [] ROM to improve independence with functional mobility   [x] Balance Training to improve static/dynamic balance and to reduce fall risk  [x] Endurance Training to improve activity tolerance during functional mobility   [x] Transfer Training to improve safety and independence with all functional transfers   [x] Gait Training to improve gait mechanics, endurance and asses need for appropriate assistive device  [] Stair Training in preparation for safe discharge home and/or into the community   [x] Positioning to prevent skin breakdown and contractures  [x] Safety and Education Training   [] Patient/Caregiver Education   [] HEP  [] Other     PT long term treatment goals are located in above grid    Frequency of treatments: 2-5x/week x 1-2 weeks.     Time in  0900  Time out  0940    Total Treatment Time  25 minutes     Evaluation Time includes thorough review of current medical information, gathering information on past medical history/social history and prior level of function, completion of standardized testing/informal observation of tasks, assessment of data and education on plan of care and goals.     CPT codes:  [x] Low Complexity PT evaluation 15045  [] Moderate Complexity PT evaluation 76770  [] High Complexity PT evaluation 57023  [] PT Re-evaluation 67501  [] Gait training 17470 - minutes  [] Manual therapy 07235 - minutes  [x] Therapeutic activities 47707 25 minutes  [] Therapeutic exercises 04836 - minutes  [] Neuromuscular reeducation 23342 - minutes     Roosevelt Lopez, PT, DPT  [id number]

## 2022-05-05 NOTE — PROGRESS NOTES
Hospitalist Progress Note      PCP: Fernando Herrera DO    Date of Admission: 5/2/2022    Hospital Course:     78y.o. year old male Hx Abdominal aortic aneurysm (AAA) >39 mm diameter (HCC), Anemia, Cardiomyopathy, Carpal tunnel syndrome, CHF (congestive heart failure) , COPD, GERD, Hx of colonic polyps, Hypertension, Hypothyroidism, Rectal bleeding, Respiratory neoplasm, Sleep apnea, and Tobacco use presents with increased shortness of breath that he is experienced over the course of the last month. Patient lives alone and independently. Patient admits to chronic oxygen use and states he follows a pulmonologist and cardiologist at the [facility]. It is chronic nature, worsened by activity, relieved only minimally by rest.  Patient also presents with bilateral 3+ pitting edema to lower extremities and scrotum, that he says has gotten worse over the last month. Patient also states that he has been experiencing dark tarry stools. Patient also presents with A. fib, and denies any history of having it, but takes Xarelto. In ER labs pertinent for elevated BUN of 24 and creatinine of 1.6 it seems to be patient's baseline. Elevated proBNP at 2562, elevated troponin, initially 69 elevated to 72. CBC shows a hemoglobin of 7.3, hemoglobin on a previous admission 8/13/2021 shows a hemoglobin of 14. 3. ABG showed pH of 7.361, CO2 73.1 bicarb 40.5, compensated respiratory acidosis. Patient was placed on BiPAP. A chest x-ray was completed and shows that the heart was enlarged. Pulmonary vessels are congested, mild opacity at the lung bases. Suspect early CHF. \"    Subjective:   He complains of right arm pain and RLE pain. He has had issues with ambulation.       Medications:  Reviewed    Infusion Medications    sodium chloride      sodium chloride       Scheduled Medications    polyethylene glycol  17 g Oral Daily    pantoprazole (PROTONIX) 40 mg injection  40 mg IntraVENous Q12H    bumetanide  2 mg IntraVENous BID    metoprolol succinate  50 mg Oral BID    Budeson-Glycopyrrol-Formoterol  2 puff Inhalation BID    levothyroxine  200 mcg Oral Daily    [Held by provider] rivaroxaban  15 mg Oral Dinner    sodium chloride  1 spray Nasal Daily    doxazosin  2 mg Oral Daily    Vitamin D  1,000 Units Oral Daily    sodium chloride flush  5-40 mL IntraVENous 2 times per day    fluticasone  2 spray Nasal Daily    levothyroxine  50 mcg Oral Daily     PRN Meds: sodium chloride, albuterol, sodium chloride flush, sodium chloride, ondansetron **OR** ondansetron, magnesium hydroxide, acetaminophen **OR** acetaminophen      Intake/Output Summary (Last 24 hours) at 5/5/2022 1123  Last data filed at 5/5/2022 0472  Gross per 24 hour   Intake 110 ml   Output 2040 ml   Net -1930 ml       Physical Exam Performed:    /73   Pulse 103   Temp 97 °F (36.1 °C) (Oral)   Resp 22   Wt 285 lb 9.6 oz (129.5 kg)   SpO2 99%   BMI 38.73 kg/m²     General appearance: No apparent distress, appears stated age and cooperative. Obese, nasal 02  HEENT: Pupils equal, round, and reactive to light. Conjunctivae/corneas clear. Neck: Supple, with full range of motion. No jugular venous distention. Trachea midline. Respiratory:  Normal respiratory effort. Clear to auscultation, bilaterally without Rales/Wheezes/Rhonchi. Cardiovascular: Regular rate and rhythm with normal S1/S2 without murmurs, rubs or gallops. Abdomen: Soft, non-tender, non-distended with normal bowel sounds. Musculoskeletal: No clubbing, cyanosis. +chronic venous stasis. +chronic edema. Skin: Skin color, texture, turgor normal.  No rashes or lesions. Neurologic:  Neurovascularly intact without any focal sensory/motor deficits.  Cranial nerves: II-XII intact, grossly non-focal.  Psychiatric: Alert and oriented, thought content appropriate, normal insight      Labs:   Recent Labs     05/03/22  0431 05/03/22  1516 05/04/22  4808 05/04/22  1630 05/04/22  1144 05/04/22  2053 05/05/22  2866 WBC 6.6  --  8.7  --   --   --  9.1   HGB 6.7*   < > 7.2*   < > 7.3* 7.1* 7.2*  7.1*   HCT 27.4*   < > 29.1*   < > 29.1* 28.0* 28.6*  28.2*     --  245  --   --   --  270    < > = values in this interval not displayed. Recent Labs     05/03/22  0431 05/04/22  0338 05/05/22  0455    138 140   K 4.0 4.1 4.1   CL 96* 92* 94*   CO2 43* 41* 42*   BUN 21 22 23   CREATININE 1.7* 1.6* 1.7*   CALCIUM 8.7 8.5* 8.6     No results for input(s): AST, ALT, BILIDIR, BILITOT, ALKPHOS in the last 72 hours. Recent Labs     05/02/22  1646   INR 1.5     No results for input(s): Becka Villatoro in the last 72 hours. Urinalysis:      Lab Results   Component Value Date    NITRU Negative 05/02/2022    WBCUA NONE 05/02/2022    BACTERIA NONE SEEN 05/02/2022    RBCUA 0-1 05/02/2022    BLOODU TRACE-INTACT 05/02/2022    SPECGRAV 1.010 05/02/2022    GLUCOSEU Negative 05/02/2022       Radiology:  XR CHEST PORTABLE   Final Result   Suspect early CHF. XR ELBOW RIGHT (2 VIEWS)    (Results Pending)   XR RADIUS ULNA RIGHT (2 VIEWS)    (Results Pending)           Assessment/Plan:    Active Hospital Problems    Diagnosis     Hypothyroidism [E03.9]      Priority: High    Decompensated heart failure (HCC) [I50.9]      Priority: Medium    Microcytic anemia [D50.9]      Priority: Medium    CKD (chronic kidney disease) stage 3, GFR 30-59 ml/min ([facility]) [N18.30]      Priority: Medium    Hypertension [I10]      Priority: Low    ZOE (obstructive sleep apnea) [G47.33]      Priority: Low    COPD (chronic obstructive pulmonary disease) ([facility]) [J44.9]      Priority: Low    Chronic respiratory failure with hypoxia and hypercapnia ([facility]) [J96.11, J96.12]     Paroxysmal atrial fibrillation ([facility]) [I48.0]     Anticoagulated [Z79.01]     Gastrointestinal hemorrhage [K92.2]      Decompensated heart failure on EF 55% on echocardiogram on 6/19/2020  -Continue IV diuretics with caution for low blood pressure. He has alkalosis. Will monitor.  -Echocardiogram 5/3 shows EF 60-65%,severe LVH,dilated RV with reduced EF.   -Daily weights  -Strict intake and output  -Consulted cardiology. Continue diuresis. Acute gastrointestinal bleed with associated blood loss anemia   -Hgb 7.3 on presentation. Transfused PRBC on 5/3/22. Xarelto on hold. -Iron deficient. Iron supplementon discharge. -DC Serial H&H. Hgb is stable. -Consulted general surgery. EGD 5/4 showed superficial antral ulcers. Continue PPI. Acute on chronic hypoxic respiratory failure-due to CHF  -Currently on 4L NC. Pt is on 4L NC at home. RUE pain after trauma  -Check Xrays of elbow and forearm  -Pain control    CKD stage 3  -Cr stable    COPD  -Continue inhalers    Hypertension  -BP borderline, will monitor    Hypothyroidism  -Continue Synthroid    History of sleep apnea  -BiPAP    Deconditioning   -PT/OT      DVT Prophylaxis: Xarelto on hold. SCDs. Diet: ADULT DIET; Regular;  Low Sodium (2 gm)  Code Status: Full Code    PT/OT Eval Status: as above    Dispo - Home with home health in 1-2day    Cabrera Bagley MD

## 2022-05-05 NOTE — CARE COORDINATION
Care Coordination:  Met with pt at bedside to discuss transition of care upon discharge. Pt on 02 , lethargic and falling asleep during conversation. States he lives alone at home in a 2 story home- resides on main floor , 3 steps to enter home, uses 4 ltrs at home through [place] and uses nebulizer as well from [place]. Has a walker, and was driving independently prior to arrival. He feels he is not independent at this point and worried about how dependent he has become in the last few days, feels he may need rehab. He only wants a rehab facility that is covered under [place], I asked if his [facility] is current and he states he does not know. If home discharge, he is agreeable to [facility] through [facility] only and brother will transport home. He follows with Dr Ning Hernández at the [place] and Dr Vimal Solis as outpt. Pt recieves medications from [place]. States he has no family contact other than brother and states that is his only family. I called the transfer center again, asking for a CM to call me back for possible transfer vs need to start dc planning.   ptx and otx ordered    Cm Kong

## 2022-05-06 LAB
ANION GAP SERPL CALCULATED.3IONS-SCNC: 8 MMOL/L (ref 7–16)
ANISOCYTOSIS: ABNORMAL
BASOPHILS ABSOLUTE: 0.04 E9/L (ref 0–0.2)
BASOPHILS RELATIVE PERCENT: 0.4 % (ref 0–2)
BLOOD BANK DISPENSE STATUS: NORMAL
BLOOD BANK PRODUCT CODE: NORMAL
BPU ID: NORMAL
BUN BLDV-MCNC: 27 MG/DL (ref 6–23)
C-REACTIVE PROTEIN: 14.8 MG/DL (ref 0–0.4)
CALCIUM SERPL-MCNC: 8.4 MG/DL (ref 8.6–10.2)
CHLORIDE BLD-SCNC: 88 MMOL/L (ref 98–107)
CO2: 41 MMOL/L (ref 22–29)
CREAT SERPL-MCNC: 1.8 MG/DL (ref 0.7–1.2)
DESCRIPTION BLOOD BANK: NORMAL
EOSINOPHILS ABSOLUTE: 0.12 E9/L (ref 0.05–0.5)
EOSINOPHILS RELATIVE PERCENT: 1.2 % (ref 0–6)
GFR AFRICAN AMERICAN: 45
GFR NON-AFRICAN AMERICAN: 37 ML/MIN/1.73
GLUCOSE BLD-MCNC: 89 MG/DL (ref 74–99)
HCT VFR BLD CALC: 30.1 % (ref 37–54)
HEMOGLOBIN: 7.4 G/DL (ref 12.5–16.5)
HYPOCHROMIA: ABNORMAL
IMMATURE GRANULOCYTES #: 0.06 E9/L
IMMATURE GRANULOCYTES %: 0.6 % (ref 0–5)
LYMPHOCYTES ABSOLUTE: 0.76 E9/L (ref 1.5–4)
LYMPHOCYTES RELATIVE PERCENT: 7.6 % (ref 20–42)
MAGNESIUM: 2.1 MG/DL (ref 1.6–2.6)
MCH RBC QN AUTO: 18.5 PG (ref 26–35)
MCHC RBC AUTO-ENTMCNC: 24.6 % (ref 32–34.5)
MCV RBC AUTO: 75.1 FL (ref 80–99.9)
MONOCYTES ABSOLUTE: 1.31 E9/L (ref 0.1–0.95)
MONOCYTES RELATIVE PERCENT: 13.1 % (ref 2–12)
NEUTROPHILS ABSOLUTE: 7.73 E9/L (ref 1.8–7.3)
NEUTROPHILS RELATIVE PERCENT: 77.1 % (ref 43–80)
PDW BLD-RTO: 25.8 FL (ref 11.5–15)
PLATELET # BLD: 259 E9/L (ref 130–450)
PMV BLD AUTO: 10.1 FL (ref 7–12)
POLYCHROMASIA: ABNORMAL
POTASSIUM SERPL-SCNC: 4 MMOL/L (ref 3.5–5)
PRO-BNP: 5551 PG/ML (ref 0–450)
RBC # BLD: 4.01 E12/L (ref 3.8–5.8)
SEDIMENTATION RATE, ERYTHROCYTE: 46 MM/HR (ref 0–15)
SODIUM BLD-SCNC: 137 MMOL/L (ref 132–146)
WBC # BLD: 10 E9/L (ref 4.5–11.5)

## 2022-05-06 PROCEDURE — 99233 SBSQ HOSP IP/OBS HIGH 50: CPT | Performed by: INTERNAL MEDICINE

## 2022-05-06 PROCEDURE — 2700000000 HC OXYGEN THERAPY PER DAY

## 2022-05-06 PROCEDURE — 83880 ASSAY OF NATRIURETIC PEPTIDE: CPT

## 2022-05-06 PROCEDURE — 6370000000 HC RX 637 (ALT 250 FOR IP): Performed by: SURGERY

## 2022-05-06 PROCEDURE — 6360000002 HC RX W HCPCS: Performed by: SURGERY

## 2022-05-06 PROCEDURE — 85025 COMPLETE CBC W/AUTO DIFF WBC: CPT

## 2022-05-06 PROCEDURE — A4216 STERILE WATER/SALINE, 10 ML: HCPCS | Performed by: INTERNAL MEDICINE

## 2022-05-06 PROCEDURE — 2580000003 HC RX 258: Performed by: SURGERY

## 2022-05-06 PROCEDURE — 6360000002 HC RX W HCPCS: Performed by: INTERNAL MEDICINE

## 2022-05-06 PROCEDURE — 2580000003 HC RX 258: Performed by: INTERNAL MEDICINE

## 2022-05-06 PROCEDURE — 2140000000 HC CCU INTERMEDIATE R&B

## 2022-05-06 PROCEDURE — 6370000000 HC RX 637 (ALT 250 FOR IP): Performed by: STUDENT IN AN ORGANIZED HEALTH CARE EDUCATION/TRAINING PROGRAM

## 2022-05-06 PROCEDURE — 85651 RBC SED RATE NONAUTOMATED: CPT

## 2022-05-06 PROCEDURE — 80048 BASIC METABOLIC PNL TOTAL CA: CPT

## 2022-05-06 PROCEDURE — 36415 COLL VENOUS BLD VENIPUNCTURE: CPT

## 2022-05-06 PROCEDURE — 94660 CPAP INITIATION&MGMT: CPT

## 2022-05-06 PROCEDURE — 86140 C-REACTIVE PROTEIN: CPT

## 2022-05-06 PROCEDURE — 6370000000 HC RX 637 (ALT 250 FOR IP): Performed by: INTERNAL MEDICINE

## 2022-05-06 PROCEDURE — 83735 ASSAY OF MAGNESIUM: CPT

## 2022-05-06 PROCEDURE — C9113 INJ PANTOPRAZOLE SODIUM, VIA: HCPCS | Performed by: INTERNAL MEDICINE

## 2022-05-06 PROCEDURE — 2500000003 HC RX 250 WO HCPCS: Performed by: SURGERY

## 2022-05-06 RX ORDER — PANTOPRAZOLE SODIUM 40 MG/1
40 TABLET, DELAYED RELEASE ORAL
Status: DISCONTINUED | OUTPATIENT
Start: 2022-05-06 | End: 2022-05-12 | Stop reason: HOSPADM

## 2022-05-06 RX ADMIN — Medication 10 ML: at 19:52

## 2022-05-06 RX ADMIN — LEVOTHYROXINE SODIUM 200 MCG: 0.2 TABLET ORAL at 06:29

## 2022-05-06 RX ADMIN — SUCRALFATE 1 G: 1 TABLET ORAL at 00:00

## 2022-05-06 RX ADMIN — Medication 1000 UNITS: at 09:23

## 2022-05-06 RX ADMIN — DOXAZOSIN 2 MG: 2 TABLET ORAL at 09:23

## 2022-05-06 RX ADMIN — PANTOPRAZOLE SODIUM 40 MG: 40 TABLET, DELAYED RELEASE ORAL at 16:10

## 2022-05-06 RX ADMIN — ONDANSETRON 4 MG: 2 INJECTION INTRAMUSCULAR; INTRAVENOUS at 19:47

## 2022-05-06 RX ADMIN — SUCRALFATE 1 G: 1 TABLET ORAL at 11:27

## 2022-05-06 RX ADMIN — SUCRALFATE 1 G: 1 TABLET ORAL at 06:30

## 2022-05-06 RX ADMIN — METOPROLOL SUCCINATE 50 MG: 50 TABLET, EXTENDED RELEASE ORAL at 09:23

## 2022-05-06 RX ADMIN — METOPROLOL SUCCINATE 50 MG: 50 TABLET, EXTENDED RELEASE ORAL at 19:47

## 2022-05-06 RX ADMIN — SODIUM CHLORIDE 40 MG: 9 INJECTION, SOLUTION INTRAMUSCULAR; INTRAVENOUS; SUBCUTANEOUS at 09:22

## 2022-05-06 RX ADMIN — LEVOTHYROXINE SODIUM 50 MCG: 0.2 TABLET ORAL at 06:29

## 2022-05-06 RX ADMIN — BUMETANIDE 2 MG: 0.25 INJECTION INTRAMUSCULAR; INTRAVENOUS at 19:47

## 2022-05-06 RX ADMIN — SALINE NASAL SPRAY 1 SPRAY: 1.5 SOLUTION NASAL at 09:27

## 2022-05-06 RX ADMIN — BUMETANIDE 2 MG: 0.25 INJECTION INTRAMUSCULAR; INTRAVENOUS at 09:21

## 2022-05-06 RX ADMIN — POLYETHYLENE GLYCOL 3350 17 G: 17 POWDER, FOR SOLUTION ORAL at 09:22

## 2022-05-06 RX ADMIN — Medication 10 ML: at 09:32

## 2022-05-06 RX ADMIN — SUCRALFATE 1 G: 1 TABLET ORAL at 18:10

## 2022-05-06 RX ADMIN — FLUTICASONE PROPIONATE 2 SPRAY: 50 SPRAY, METERED NASAL at 09:27

## 2022-05-06 ASSESSMENT — PAIN SCALES - WONG BAKER
WONGBAKER_NUMERICALRESPONSE: 0
WONGBAKER_NUMERICALRESPONSE: 0

## 2022-05-06 ASSESSMENT — PAIN SCALES - GENERAL
PAINLEVEL_OUTOF10: 0

## 2022-05-06 NOTE — CARE COORDINATION
Care Coordination: Spoke to january again at [facility], went in pt room on speaker phone. Pt does not want to pay out of pocket, states he has [employer] and straight Medicare a/b and cannot afford to pay for rehab. Taj Alonso spoke to brother and son [facility] and there does not seem to be support at home. I tried to call radha at [phone]- number [facility] provided, left message requesting call back. In the meantime, pt lives in [place] and had no preference of facilities and referral made to [facility] to run benefits and see if they can accept. Joceline Hartley will be there until 200, also she can  Set up for hhc and hhc aids but there could be co pay at [facility], depending on financials and brother or son would have to provide proof and fill out paperwork which could delay as well. Awaiting return call from [facility] for Transition of Care is related to the following treatment goals: dc plan    The Patient and/or patient representative  was provided with a choice of provider and agrees   with the discharge plan. [x] Yes [] No    Freedom of choice list was provided with basic dialogue that supports the patient's individualized plan of care/goals, treatment preferences and shares the quality data associated with the providers.  [x] Yes [] No Unknown if ever smoked

## 2022-05-06 NOTE — PROGRESS NOTES
INPATIENT CARDIOLOGY FOLLOW-UP    Name: Clary Munoz    Age: 78 y.o. Date of Admission: 5/2/2022 10:30 AM    Date of Service: 5/6/2022    [facility]    Chief Complaint: Follow-up for CHF    Interim History:  Feeling unwell, states he feels worse than when he came in. His right arm is in severe pain ever since his endoscopy. Also states he is having trouble walking. Denies shortness of breath. Scrotal swelling persistent but improved. Net -6.2 L.     EGD showing superficial antral ulcers    Review of Systems:   Negative except as described above    Problem List:  Patient Active Problem List   Diagnosis    Hypertension    ZOE (obstructive sleep apnea)    [facility]    COPD [facility]chronic obstructive pulmonary disease) [facility]    Hypothyroidism    Chronic respiratory failure with hypoxia and hypercapnia (HCC)    Congestive heart failure (HCC)    Paroxysmal atrial fibrillation (HCC)    Anticoagulated    Gastrointestinal hemorrhage    Atrial fibrillation, chronic (HCC)    Pericardial effusion    JUDITH (acute kidney injury) [facility]    Acute on chronic diastolic heart failure (HCC)    Acute on chronic respiratory failure (HCC)    Decompensated heart failure (HCC)    Microcytic anemia    CKD (chronic kidney disease) stage 3, GFR 30-59 ml/min ([facility])       Current Medications:    Current Facility-Administered Medications:     sucralfate (CARAFATE) tablet 1 g, 1 g, Oral, 4 times per day, Ramo Hearn MD, 1 g at 05/06/22 0630    polyethylene glycol (GLYCOLAX) packet 17 g, 17 g, Oral, Daily, Petra Truong MD, 17 g at 05/05/22 1006    pantoprazole (PROTONIX) 40 mg in sodium chloride (PF) 10 mL injection, 40 mg, IntraVENous, Q12H, Meghan Calderon MD, 40 mg at 05/05/22 2141    bumetanide (BUMEX) injection 2 mg, 2 mg, IntraVENous, BID, Ramo Hearn MD, 2 mg at 05/05/22 2141    metoprolol succinate (TOPROL XL) extended release tablet 50 mg, 50 mg, Oral, BID, Ned ANDREA Monica Cavanaugh MD, 50 mg at 05/05/22 2141    albuterol (PROVENTIL) nebulizer solution 2.5 mg, 2.5 mg, Nebulization, Q6H PRN, Jean Russell MD    Budeson-Glycopyrrol-Formoterol 160-9-4.8 MCG/ACT AERO 2 puff, 2 puff, Inhalation, BID, Jean Russell MD, 2 puff at 05/05/22 0900    levothyroxine (SYNTHROID) tablet 200 mcg, 200 mcg, Oral, Daily, Jean Russell MD, 200 mcg at 05/06/22 7547    [Held by provider] rivaroxaban (XARELTO) tablet 15 mg, 15 mg, Oral, Dinner, James Prado MD    sodium chloride (OCEAN, BABY AYR) 0.65 % nasal spray 1 spray, 1 spray, Nasal, Daily, Jean Russell MD, 1 spray at 05/05/22 1005    doxazosin (CARDURA) tablet 2 mg, 2 mg, Oral, Daily, Jean Russell MD, 2 mg at 05/05/22 8427    Vitamin D (CHOLECALCIFEROL) tablet 1,000 Units, 1,000 Units, Oral, Daily, Jean Russell MD, 1,000 Units at 05/05/22 5226    sodium chloride flush 0.9 % injection 5-40 mL, 5-40 mL, IntraVENous, 2 times per day, Jean Russell MD, 10 mL at 05/05/22 2142    sodium chloride flush 0.9 % injection 10 mL, 10 mL, IntraVENous, PRN, Jean Russell MD    0.9 % sodium chloride infusion, , IntraVENous, PRN, Jean Russell MD    ondansetron (ZOFRAN-ODT) disintegrating tablet 4 mg, 4 mg, Oral, Q8H PRN **OR** ondansetron (ZOFRAN) injection 4 mg, 4 mg, IntraVENous, Q6H PRN, Jean Russell MD    magnesium hydroxide (MILK OF MAGNESIA) 400 MG/5ML suspension 30 mL, 30 mL, Oral, Daily PRN, Jean Russell MD    acetaminophen (TYLENOL) tablet 650 mg, 650 mg, Oral, Q6H PRN, 650 mg at 05/05/22 1423 **OR** acetaminophen (TYLENOL) suppository 650 mg, 650 mg, Rectal, Q6H PRN, Jean Russell MD    fluticasone (FLONASE) 50 MCG/ACT nasal spray 2 spray, 2 spray, Nasal, Daily, Jean Russell MD, 2 spray at 05/05/22 1005    levothyroxine (SYNTHROID) tablet 50 mcg, 50 mcg, Oral, Daily, Jean Russell MD, 50 mcg at 05/06/22 1877    Physical Exam:  BP 99/64   Pulse 101   Temp 98.1 °F (36.7 °C) (Oral)   Resp 18   Wt 285 lb 11.2 oz (129.6 kg)   SpO2 99%   BMI 38.75 kg/m²   Wt Readings from Last 3 Encounters:   05/06/22 285 lb 11.2 oz (129.6 kg)   08/13/21 270 lb (122.5 kg)   06/27/20 242 lb (109.8 kg)     Appearance: Awake, alert, on nasal cannula  Skin: Intact, no rash  Head: Normocephalic, atraumatic  Eyes: EOMI, no conjunctival erythema  ENMT: No pharyngeal erythema, MMM, no rhinorrhea  Neck: Supple, no elevated JVP, no carotid bruits  Lungs: Diminished. Cardiac: PMI nondisplaced, irregular rhythm with a normal rate, S1 & S2 normal, no murmurs  Abdomen: Soft, distended, nontender, +bowel sounds  : Scrotal swelling improved but still present, scrotum about the size of a large grapefruit, [place] catheter in place  Extremities: Moves all extremities x 4, no lower extremity edema  Right upper extremity with mild swelling and severe tenderness to minimal palpation throughout  Neurologic: No focal motor deficits apparent, normal mood and affect  Peripheral Pulses: Intact posterior tibial pulses bilaterally    Intake/Output:    Intake/Output Summary (Last 24 hours) at 5/6/2022 0813  Last data filed at 5/6/2022 7172  Gross per 24 hour   Intake 210 ml   Output 1975 ml   Net -1765 ml     No intake/output data recorded. Laboratory Tests:  Recent Labs     05/04/22 0338 05/05/22 0455 05/06/22 0457    140 137   K 4.1 4.1 4.0   CL 92* 94* 88*   CO2 41* 42* 41*   BUN 22 23 27*   CREATININE 1.6* 1.7* 1.8*   GLUCOSE 102* 151* 89   CALCIUM 8.5* 8.6 8.4*     Lab Results   Component Value Date    MG 2.1 05/06/2022     No results for input(s): ALKPHOS, ALT, AST, PROT, BILITOT, BILIDIR, LABALBU in the last 72 hours.   Recent Labs     05/04/22  0338 05/04/22  1144 05/04/22 2053 05/05/22 0455 05/06/22  0457   WBC 8.7  --   --  9.1 10.0   RBC 3.96  --   --  3.88 4.01   HGB 7.2*   < > 7.1* 7.2*  7.1* 7.4*   HCT 29.1*   < > 28.0* 28.6*  28.2* 30.1*   MCV 73.5*  --   --  73.7* 75.1*   MCH 18.2*  --   --  18.6* 18.5*   MCHC 24.7*  --   --  25.2* 24.6* RDW 24.7*  --   --  25.4* 25.8*     --   --  270 259   MPV 9.6  --   --  10.3 10.1    < > = values in this interval not displayed. Lab Results   Component Value Date    TROPONINI <0.01 2020    TROPONINI <0.01 2020    TROPONINI <0.01 2018     Lab Results   Component Value Date    INR 1.5 2022    INR 1.3 2018    INR 1.1 2016    PROTIME 17.1 (H) 2022    PROTIME 15.5 (H) 2018    PROTIME 12.1 2016     Lab Results   Component Value Date    TSH 1.090 2020     Lab Results   Component Value Date    LABA1C 6.3 (H) 2016     No results found for: EAG  Lab Results   Component Value Date    CHOL 91 2022    CHOL 116 2016     Lab Results   Component Value Date    TRIG 49 2022    TRIG 51 2016     Lab Results   Component Value Date    HDL 37 2022    HDL 51 2016     Lab Results   Component Value Date    LDLCALC 44 2022    LDLCALC 55 2016     Lab Results   Component Value Date    LABVLDL 10 2022    LABVLDL 10 2016     No results found for: CHOLHDLRATIO  Recent Labs     22  0338 22  0457   PROBNP 3,030* 5,551*       Cardiac Tests:    EK2022: Atrial fibrillation 90 beats minute. Normal axis. Incomplete right bundle branch block QRS duration 108 ms. PVC or aberrant complex. Possible septal infarct    Telemetry: Atrial fibrillation 90s to 100s, NSVT    Chest X-ray:       Impression   Suspect early CHF. Echocardiogram:   Transthoracic echo 5/3/2022   Summary   Technically difficult study - limited visualization. Atrial fibrillation noted. Micro-bubble contrast injected to enhance left ventricular visualization. Normal left ventricular size and systolic function. Ejection fraction is visually estimated at 60-65%. Indeterminate diastolic function. No regional wall motion abnormalities seen. Severe left ventricular concentric hypertrophy noted.    Severely dilated right ventricle with reduced function. Biatrial dilation. No significant valvular abnormalities. Stress test:      Cardiac catheterization:   [facility] ([facility]): Pressures --> RA 12, PCWP 24, PA 55/27 mean 38, RV 55/9. CO 6.82. CI 2.64. · Cardiac catheterization at the [facility] in 2013 with angiographically normal coronaries (report under Media)    ----------------------------------------------------------------------------------------------------------------------------------------------------------------  IMPRESSION:  1. Acute on chronic HFpEF/right heart failure. proBNP 3000 -> 5600. Net -6.2 L  2. Acute on chronic hypoxic/hypercapnic respiratory failure  3. Longstanding persistent atrial fibrillation (likely permanent). History of atrial flutter. AC with dose adjusted rivaroxaban currently held  4. 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 Ascending TAA 4 x 4 cm 2018  6. Melena, acute on chronic anemia suspected GI bleeding. Hemoglobin 6.7 -> 7.4 posttransfusion. EGD showing antral ulcers  7. History of prior GI bleeding 2018 PUD/gastritis  8. CKD creatinine 1.6-1.8  9. ZOE, noncompliant  10. Severe COPD  11. Hypothyroidism  12. Morbid obesity    RECOMMENDATIONS:  Improving, remains volume overloaded. Creatinine fairly stable and uptrending, serum bicarb 41     Continue IV diuresis, transition to p.o. in the next 24 hours   Continue metoprolol succinate   Rivaroxaban on hold, okay to resume per surgery but still rather anemic   Right upper extremity ultrasound to rule out DVT, not sure why he is in so much pain   Aggressive risk factor modification   Further care per primary and other consultants    Jules Mattson MD, [address] [place] [address] Cardiology    NOTE: This report was transcribed using voice recognition software. Every effort was made to ensure accuracy; however, inadvertent computerized transcription errors may be present.

## 2022-05-06 NOTE — PROGRESS NOTES
Hospitalist Progress Note      PCP: Audi Cooley DO    Date of Admission: 5/2/2022    Hospital Course:     78y.o. year old male Hx Abdominal aortic aneurysm (AAA) >39 mm diameter (HCC), Anemia, Cardiomyopathy, Carpal tunnel syndrome, CHF (congestive heart failure) , COPD, GERD, Hx of colonic polyps, Hypertension, Hypothyroidism, Rectal bleeding, Respiratory neoplasm, Sleep apnea, and Tobacco use presents with increased shortness of breath that he is experienced over the course of the last month. Patient lives alone and independently. Patient admits to chronic oxygen use and states he follows a pulmonologist and cardiologist at the [facility]. It is chronic nature, worsened by activity, relieved only minimally by rest.  Patient also presents with bilateral 3+ pitting edema to lower extremities and scrotum, that he says has gotten worse over the last month. Patient also states that he has been experiencing dark tarry stools. Patient also presents with A. fib, and denies any history of having it, but takes Xarelto. In ER labs pertinent for elevated BUN of 24 and creatinine of 1.6 it seems to be patient's baseline. Elevated proBNP at 2562, elevated troponin, initially 69 elevated to 72. CBC shows a hemoglobin of 7.3, hemoglobin on a previous admission 8/13/2021 shows a hemoglobin of 14. 3. ABG showed pH of 7.361, CO2 73.1 bicarb 40.5, compensated respiratory acidosis. Patient was placed on BiPAP. A chest x-ray was completed and shows that the heart was enlarged. Pulmonary vessels are congested, mild opacity at the lung bases. Suspect early CHF. \"    Subjective:   He continues to complain of right arm pain and RLE pain. He cannot bend elbow.       Medications:  Reviewed    Infusion Medications    sodium chloride       Scheduled Medications    sucralfate  1 g Oral 4 times per day    polyethylene glycol  17 g Oral Daily    pantoprazole (PROTONIX) 40 mg injection  40 mg IntraVENous Q12H    bumetanide  2 mg IntraVENous BID    metoprolol succinate  50 mg Oral BID    Budeson-Glycopyrrol-Formoterol  2 puff Inhalation BID    levothyroxine  200 mcg Oral Daily    [Held by provider] rivaroxaban  15 mg Oral Dinner    sodium chloride  1 spray Nasal Daily    doxazosin  2 mg Oral Daily    Vitamin D  1,000 Units Oral Daily    sodium chloride flush  5-40 mL IntraVENous 2 times per day    fluticasone  2 spray Nasal Daily    levothyroxine  50 mcg Oral Daily     PRN Meds: albuterol, sodium chloride flush, sodium chloride, ondansetron **OR** ondansetron, magnesium hydroxide, acetaminophen **OR** acetaminophen      Intake/Output Summary (Last 24 hours) at 5/6/2022 1013  Last data filed at 5/6/2022 4532  Gross per 24 hour   Intake 200 ml   Output 1975 ml   Net -1775 ml       Physical Exam Performed:    BP 93/72   Pulse 104   Temp 96.9 °F (36.1 °C) (Tympanic)   Resp 19   Wt 285 lb 11.2 oz (129.6 kg)   SpO2 100%   BMI 38.75 kg/m²     General appearance: No apparent distress, appears stated age and cooperative. Obese, nasal 02  HEENT: Pupils equal, round, and reactive to light. Conjunctivae/corneas clear. Neck: Supple, with full range of motion. No jugular venous distention. Trachea midline. Respiratory:  Normal respiratory effort. Clear to auscultation, bilaterally without Rales/Wheezes/Rhonchi. Cardiovascular: Regular rate and rhythm with normal S1/S2 without murmurs, rubs or gallops. Abdomen: Soft, non-tender, non-distended with normal bowel sounds. Musculoskeletal: No clubbing, cyanosis. +chronic venous stasis. +chronic edema. R elbow with edema and limited ROM  Skin: Skin color, texture, turgor normal.  No rashes or lesions.   Neurologic: grossly non-focal.  Psychiatric: Alert and oriented, thought content appropriate, normal insight      Labs:   Recent Labs     05/04/22  0338 05/04/22  1144 05/04/22 2053 05/05/22  0455 05/06/22  0457   WBC 8.7  --   --  9.1 10.0   HGB 7.2*   < > 7.1* 7.2*  7.1* 7.4*   HCT 29.1*   < > 28.0* 28.6*  28.2* 30.1*     --   --  270 259    < > = values in this interval not displayed. Recent Labs     05/04/22  0338 05/05/22 0455 05/06/22 0457    140 137   K 4.1 4.1 4.0   CL 92* 94* 88*   CO2 41* 42* 41*   BUN 22 23 27*   CREATININE 1.6* 1.7* 1.8*   CALCIUM 8.5* 8.6 8.4*     No results for input(s): AST, ALT, BILIDIR, BILITOT, ALKPHOS in the last 72 hours. No results for input(s): INR in the last 72 hours. No results for input(s): Kelvin Altes in the last 72 hours. Urinalysis:      Lab Results   Component Value Date    NITRU Negative 05/02/2022    WBCUA NONE 05/02/2022    BACTERIA NONE SEEN 05/02/2022    RBCUA 0-1 05/02/2022    BLOODU TRACE-INTACT 05/02/2022    SPECGRAV 1.010 05/02/2022    GLUCOSEU Negative 05/02/2022       Radiology:  XR RADIUS ULNA RIGHT (2 VIEWS)   Final Result   No acute fracture or malalignment. Degenerative changes. XR ELBOW RIGHT (2 VIEWS)   Final Result   Soft tissue edema dorsal to the elbow joint adjacent to the proximal forearm. Elevation of the anterior and posterior fat pads consistent with effusion. XR CHEST PORTABLE   Final Result   Suspect early CHF.          US DUP UPPER EXTREMITY RIGHT VENOUS    (Results Pending)           Assessment/Plan:    Active Hospital Problems    Diagnosis     Hypothyroidism [E03.9]      Priority: High    Decompensated heart failure (HCC) [I50.9]      Priority: Medium    Microcytic anemia [D50.9]      Priority: Medium    CKD (chronic kidney disease) stage 3, GFR 30-59 ml/min ([facility]) [N18.30]      Priority: Medium    Hypertension [I10]      Priority: Low    ZOE (obstructive sleep apnea) [G47.33]      Priority: Low    COPD (chronic obstructive pulmonary disease) ([facility]) [J44.9]      Priority: Low    Chronic respiratory failure with hypoxia and hypercapnia ([facility]) [J96.11, J96.12]     Paroxysmal atrial fibrillation ([facility]) [I48.0]     Anticoagulated [Z79.01]     Gastrointestinal hemorrhage [K92.2]      Decompensated heart failure on EF 55% on echocardiogram on 6/19/2020  [facility]. Continue IV diuretics with caution for low blood pressure. He has alkalosis. Plan for transition to ora tomorrow. Will monitor.  -Echocardiogram 5/3 shows EF 60-65%,severe LVH,dilated RV with reduced EF.   -Daily weights  -Strict intake and output    Acute gastrointestinal bleed with associated blood loss anemia   -Hgb 7.3 on presentation. Transfused PRBC on 5/3/22. Xarelto on hold. -Iron deficient. Iron supplementon discharge. -DC Serial H&H. Hgb is stable. -Consulted general surgery. EGD 5/4 showed superficial antral ulcers. Continue PPI. Ok for anticoagulation. FU office. Acute on chronic hypoxic respiratory failure-due to CHF  -Currently on 4L NC. Pt is on 4L NC at home. Atrial fibrillation  -Rate controlled  -Xarelto was held for anemia. Restart when ok with Cardiology    RUE pain after trauma  -Check Xrays of elbow and forearm Soft tissue edema dorsal to the elbow joint adjacent to the proximal forearm and Elevation of the anterior and posterior fat pads consistent with effusion. Consult Ortho.  -Pain control    CKD stage 3  -Cr stable    COPD  -Continue inhalers    Hypertension  -BP borderline, will monitor    Hypothyroidism  -Continue Synthroid    History of sleep apnea  -BiPAP    Deconditioning   -PT/OT      DVT Prophylaxis: Xarelto on hold. SCDs. Diet: ADULT DIET; Regular;  Low Sodium (2 gm)  Code Status: Full Code    PT/OT Eval Status: as above    Dispo - Home with home health in 1-2day    Margarito Lucero MD

## 2022-05-06 NOTE — CARE COORDINATION
Care Coordination: again, called [facility] transfer center, straight to  and left my 3rd message asking to prioritize as he was a send out. I have called [facility], left detailed message for Christopher Connell, social work for any assistance. If pt does not go to [facility], he will need hhc services as well and possibly an aide set up. Pt's only support is Brother.  Will follow and wait for return call    Radha Garcia

## 2022-05-06 NOTE — CARE COORDINATION
Care Coordination: Spoke to pt at bedside regarding RICHELLE. Explained I called [facility] and was told he is not service connected but they will cover this stay as he was a send in. I am waiting for case management to call back for possible transfer if indicated. He states he will not go to [facility] unless it is covered under the [place], as his management medicare does not cover all costs and he is on a fixed income and he will just go home. Pt states he was completely independent a few days ago and this acute illness has knocked him down. He states he is not feeling any better than yesterday. Awaiting return call from social work at [place], as checking to see if they will cover [facility]. Jasper Pereira    Addendum: received a call back from Shanna Remoov work at [employer] at ext [phone]. He is not service connected and he will not have rehab covered under [place]. She can work on [employer] services and she will reach out to nursing to work on [facility] but this will not occur for a week. She is reaching out to brother as she is concerned about him returning home in his deconditioned state.  She has my call back and will let me know after she speaks to brother    Jasper Pereira

## 2022-05-06 NOTE — CARE COORDINATION
Received a call from [place] with the [address] [place] [address] returning 's phone call after receiving a message several attempts had been made and no return calls. He states he is unable to assist with the patient but is able to provide the contact. Patient's assigned  at the [address] [place] [address] is [employer] ext. [phone]. [place] transferred call to [place] but no answer; left message to return call and stated assistance needed for discharge planning.     MATT Aiken, LSW [phone]

## 2022-05-06 NOTE — CONSULTS
Department of Orthopedic Surgery  Resident Consult Note          Reason for Consult:  Right elbow pain     HISTORY OF PRESENT ILLNESS:     Patient is a 78 y.o. male who presents with elbow pain that he states started two days prior after his EGD procedure. Onset was sudden and has progressed in severity over the course of the 2 days. Patient is reporting decrease range of motion with some muscle weakness. Pain is reported to be severe, non radiating and sharp in nature. Patient denies trauma and indicated that he had gout several years ago. Patient is right hand dominant. Anticoagulation - Xarelto. Patient has a significant history of Abdominal aneurysm, CHF, COPD, Rectal bleeding. Patient is admitted to the hospital for acute CHF. Previous Orthopedic Surgeon - no  Denies numbness/tingling/paresthesias. Denies any other orthopedic complaints at this time.     Past Medical History:        Diagnosis Date    Abdominal aortic aneurysm (AAA) >39 mm diameter (HCC)     Anemia     Cardiomyopathy (HCC)     Carpal tunnel syndrome     CHF (congestive heart failure) (HCC)     echo 3/5/16 ef 74%, stage 1 diastolic    COPD (chronic obstructive pulmonary disease) (HCC)     GERD (gastroesophageal reflux disease)     Hx of colonic polyps     Hypertension     Hypothyroidism     Rectal bleeding     Respiratory neoplasm     Sleep apnea     Tobacco use      Past Surgical History:        Procedure Laterality Date    ABDOMEN SURGERY      CARDIAC CATHETERIZATION      CHOLECYSTECTOMY      COLONOSCOPY      COLONOSCOPY N/A 12/9/2018    COLONOSCOPY DIAGNOSTIC performed by Avery Hooker MD at [facility], ESOPHAGUS      ENDOSCOPY, COLON, DIAGNOSTIC      FRACTURE SURGERY      JOINT REPLACEMENT      SALIVARY GLAND SURGERY      UPPER GASTROINTESTINAL ENDOSCOPY N/A 12/9/2018    EGD DIAGNOSTIC ONLY performed by Avery Hooker MD at [address] [facility] 5/4/2022    EGD BIOPSY performed by Roxie Jordan MD at [facility] [address]       Current Medications:   Current Facility-Administered Medications: sucralfate (CARAFATE) tablet 1 g, 1 g, Oral, 4 times per day  polyethylene glycol (GLYCOLAX) packet 17 g, 17 g, Oral, Daily  pantoprazole (PROTONIX) 40 mg in sodium chloride (PF) 10 mL injection, 40 mg, IntraVENous, Q12H  bumetanide (BUMEX) injection 2 mg, 2 mg, IntraVENous, BID  metoprolol succinate (TOPROL XL) extended release tablet 50 mg, 50 mg, Oral, BID  albuterol (PROVENTIL) nebulizer solution 2.5 mg, 2.5 mg, Nebulization, Q6H PRN  Budeson-Glycopyrrol-Formoterol 160-9-4.8 MCG/ACT AERO 2 puff, 2 puff, Inhalation, BID  levothyroxine (SYNTHROID) tablet 200 mcg, 200 mcg, Oral, Daily  [Held by provider] rivaroxaban (XARELTO) tablet 15 mg, 15 mg, Oral, Dinner  sodium chloride (OCEAN, BABY AYR) 0.65 % nasal spray 1 spray, 1 spray, Nasal, Daily  doxazosin (CARDURA) tablet 2 mg, 2 mg, Oral, Daily  Vitamin D (CHOLECALCIFEROL) tablet 1,000 Units, 1,000 Units, Oral, Daily  sodium chloride flush 0.9 % injection 5-40 mL, 5-40 mL, IntraVENous, 2 times per day  sodium chloride flush 0.9 % injection 10 mL, 10 mL, IntraVENous, PRN  0.9 % sodium chloride infusion, , IntraVENous, PRN  ondansetron (ZOFRAN-ODT) disintegrating tablet 4 mg, 4 mg, Oral, Q8H PRN **OR** ondansetron (ZOFRAN) injection 4 mg, 4 mg, IntraVENous, Q6H PRN  magnesium hydroxide (MILK OF MAGNESIA) 400 MG/5ML suspension 30 mL, 30 mL, Oral, Daily PRN  acetaminophen (TYLENOL) tablet 650 mg, 650 mg, Oral, Q6H PRN **OR** acetaminophen (TYLENOL) suppository 650 mg, 650 mg, Rectal, Q6H PRN  fluticasone (FLONASE) 50 MCG/ACT nasal spray 2 spray, 2 spray, Nasal, Daily  levothyroxine (SYNTHROID) tablet 50 mcg, 50 mcg, Oral, Daily  Allergies:  Amlodipine, Isosorbide mononitrate [isosorbide nitrate], and Lisinopril    Social History:   TOBACCO:   reports that he quit smoking about 22 years ago.  He has never used smokeless tobacco.  ETOH:   reports current alcohol use of about 21.0 standard drinks of alcohol per week. DRUGS:   has no history on file for drug use. ACTIVITIES OF DAILY LIVING:    OCCUPATION:    Family History:   No family history on file. REVIEW OF SYSTEMS:  CONSTITUTIONAL:  negative for  fevers, chills  EYES:  negative for blurred vision, visual disturbance  HEENT:  negative for  hearing loss, voice change  RESPIRATORY:  negative for  dyspnea, wheezing  CARDIOVASCULAR:  negative for  chest pain, palpitations  GASTROINTESTINAL:  negative for nausea, vomiting  GENITOURINARY:  negative for frequency, urinary incontinence  HEMATOLOGIC/LYMPHATIC:  negative for bleeding and petechiae  MUSCULOSKELETAL:  positive for  pain, joint swelling and decreased range of motion  NEUROLOGICAL:  negative for headaches, dizziness  BEHAVIOR/PSYCH:  negative for increased agitation and anxiety    PHYSICAL EXAM:    VITALS:  BP 93/72   Pulse 104   Temp 96.9 °F (36.1 °C) (Tympanic)   Resp 19   Wt 285 lb 11.2 oz (129.6 kg)   SpO2 100%   BMI 38.75 kg/m²   CONSTITUTIONAL:  awake, alert, cooperative, no apparent distress, and appears stated age  MUSCULOSKELETAL:  Right upper Extremity:  ·  Skin intact circumferentially, no evidence of acute skin trauma, no abrasion, scrapes or skin tears. · No erythema present. Mild joint swelling seen. · +TTP olecranon and distal radius and ulna dorsally. Mild pain at the forearm  · A palpable mobile mass felt at the olecranon  · Compartments soft and compressible  · +AIN/PIN/Ulnar nerve function intact grossly  · +Radial pulse, Brisk Cap refill, hand warm and perfused  · Sensation intact to touch in radial/ulnar/median nerve distributions to hand, however mildly diminished to the dorsum hand. Patient states this is his baseline    Secondary Exam:   · leftUE: No obvious signs of trauma. Ecchymosis present at the antecubital fossa. This could be secondary to IV line insertion.   -TTP to fingers, hand, wrist, forearm, elbow, humerus, shoulder or clavicle. -- Patient able to flex/extend fingers, wrist, elbow and shoulder with active and passive ROM without pain, +2/4 Radial pulse, cap refill <3sec, +AIN/PIN/Radial/Ulnar/Median N, distal sensation grossly intact to C4-T1 dermatomes, compartments soft and compressible. · bilateralLE: No obvious signs of trauma. -TTP to foot, ankle, leg, knee, thigh, hip.-- Patient able to flex/extend toes, ankle, knee and hip with active and passive ROM without pain,+2/4 DP & PT pulses, cap refill <3sec, +5/5 PF/DF/EHL, distal sensation grossly intact to L4-S1 dermatomes, compartments soft and compressible. · Pelvis: -TTP, -Log roll, -Heel strike     DATA:    CBC:   Lab Results   Component Value Date    WBC 10.0 05/06/2022    RBC 4.01 05/06/2022    HGB 7.4 05/06/2022    HCT 30.1 05/06/2022    MCV 75.1 05/06/2022    MCH 18.5 05/06/2022    MCHC 24.6 05/06/2022    RDW 25.8 05/06/2022     05/06/2022    MPV 10.1 05/06/2022     PT/INR:    Lab Results   Component Value Date    PROTIME 17.1 05/02/2022    INR 1.5 05/02/2022     CRP/ESR: No results found for: CRP, SEDRATE  Lactic Acid :   Lab Results   Component Value Date    LACTA 1.2 12/07/2018       Radiology Review:  05/06/22 - XR right elbow demonstrate degenerative changes at the olecranon. Osteophytic changes at the ulnohumeral joint. No other fractures or dislocation noted. IMPRESSION:   · Right elbow bursitis vs inflammatory arthritis    PLAN:   WBAT - RUE   Infectious markers ordered   XR right wrist and forearm ordered.  Pain Control primary   PT/OT when able   Continue ice and elevation to decrease swelling  · Continue current management. Will consider advance imaging pending results from infectious markers. Continue to maintain strict ice, compression and elevation for pain and edema control.    · Discussed with Dr. Jeanne Thomas

## 2022-05-06 NOTE — PROGRESS NOTES
Patient insurance information obtained at this time, insurance card copied and placed in the patients hard chart.

## 2022-05-06 NOTE — CARE COORDINATION
"Requested Prescriptions   Pending Prescriptions Disp Refills     insulin glargine (BASAGLAR KWIKPEN) 100 UNIT/ML pen [Pharmacy Med Name: BASAGLAR KWIKPEN 100UNIT/ML SOPN] 15 mL 3    Last Written Prescription Date:  1/24/18  Last Fill Quantity: 1/24/18,  # refills: NA   Last office visit: 9/19/2018 with prescribing provider:  MIRNA   Future Office Visit:     Sig: INJECT 18 UNITS UNDER THE SKIN EVERY MORNING    Long Acting Insulin Protocol Failed - 1/2/2019  9:32 AM       Failed - LDL on file in past 12 months    Recent Labs   Lab Test 03/29/16  0952   LDL 62          Passed - Blood pressure less than 140/90 in past 6 months    BP Readings from Last 3 Encounters:   09/19/18 138/88   03/16/18 122/65   02/22/18 139/57          Passed - Microalbumin on file in past 12 months    Recent Labs   Lab Test 03/07/18  1009   MICROL 16   UMALCR 60.69*          Passed - Serum creatinine on file in past 12 months    Recent Labs   Lab Test 09/19/18  1100   CR 1.75*          Passed - HgbA1C in past 3 or 6 months    If HgbA1C is 8 or greater, it needs to be on file within the past 3 months.  If less than 8, must be on file within the past 6 months.     Recent Labs   Lab Test 09/19/18  1100   A1C 4.9          Passed - Patient is age 18 or older       Passed - Recent (6 mo) or future (30 days) visit within the authorizing provider's specialty    Patient had office visit in the last 6 months or has a visit in the next 30 days with authorizing provider or within the authorizing provider's specialty.  See \"Patient Info\" tab in inbasket, or \"Choose Columns\" in Meds & Orders section of the refill encounter.          Routing refill request to provider for review/approval because:  Labs out of range:  UMALCR  Medication is reported/Transitional  T'd up 1 month for provider review.    Debra Hernandez RN            " Care Coordination: received a call back from [address] [place] [address], unable to verify without ID number for [facility] as Medicare is not primary. This ID was taken out and replaced by [place]. I have attempted to go through chart and cannot locate [facility] Id. SPoke to pt, he does not have card, states son has wallet and at work. Cannot proceed further to verify benefits.  I have called [facility] to notifiashok Suarez

## 2022-05-06 NOTE — CARE COORDINATION
Care Coordination: received a call back from [employer] at [address] [place] [address]. Pt is not service connected, but was a send out. She will \"enter information in system and  should be calling\".     Chantelle Francois

## 2022-05-07 ENCOUNTER — APPOINTMENT (OUTPATIENT)
Dept: ULTRASOUND IMAGING | Age: 77
DRG: 291 | End: 2022-05-07
Payer: OTHER GOVERNMENT

## 2022-05-07 LAB
ANION GAP SERPL CALCULATED.3IONS-SCNC: 8 MMOL/L (ref 7–16)
ANISOCYTOSIS: ABNORMAL
BASOPHILS ABSOLUTE: 0.1 E9/L (ref 0–0.2)
BASOPHILS RELATIVE PERCENT: 0.9 % (ref 0–2)
BUN BLDV-MCNC: 36 MG/DL (ref 6–23)
CALCIUM SERPL-MCNC: 8.7 MG/DL (ref 8.6–10.2)
CHLORIDE BLD-SCNC: 90 MMOL/L (ref 98–107)
CO2: 40 MMOL/L (ref 22–29)
CREAT SERPL-MCNC: 2.1 MG/DL (ref 0.7–1.2)
EOSINOPHILS ABSOLUTE: 0 E9/L (ref 0.05–0.5)
EOSINOPHILS RELATIVE PERCENT: 0.6 % (ref 0–6)
GFR AFRICAN AMERICAN: 37
GFR NON-AFRICAN AMERICAN: 31 ML/MIN/1.73
GLUCOSE BLD-MCNC: 104 MG/DL (ref 74–99)
HCT VFR BLD CALC: 30.8 % (ref 37–54)
HEMOGLOBIN: 7.6 G/DL (ref 12.5–16.5)
HYPOCHROMIA: ABNORMAL
LYMPHOCYTES ABSOLUTE: 0.44 E9/L (ref 1.5–4)
LYMPHOCYTES RELATIVE PERCENT: 4.3 % (ref 20–42)
MAGNESIUM: 2.2 MG/DL (ref 1.6–2.6)
MCH RBC QN AUTO: 18.4 PG (ref 26–35)
MCHC RBC AUTO-ENTMCNC: 24.7 % (ref 32–34.5)
MCV RBC AUTO: 74.8 FL (ref 80–99.9)
MONOCYTES ABSOLUTE: 0.88 E9/L (ref 0.1–0.95)
MONOCYTES RELATIVE PERCENT: 7.8 % (ref 2–12)
NEUTROPHILS ABSOLUTE: 9.57 E9/L (ref 1.8–7.3)
NEUTROPHILS RELATIVE PERCENT: 87 % (ref 43–80)
OVALOCYTES: ABNORMAL
PDW BLD-RTO: 26.5 FL (ref 11.5–15)
PLATELET # BLD: 283 E9/L (ref 130–450)
PMV BLD AUTO: 9.8 FL (ref 7–12)
POIKILOCYTES: ABNORMAL
POLYCHROMASIA: ABNORMAL
POTASSIUM SERPL-SCNC: 4.9 MMOL/L (ref 3.5–5)
RBC # BLD: 4.12 E12/L (ref 3.8–5.8)
SODIUM BLD-SCNC: 138 MMOL/L (ref 132–146)
WBC # BLD: 11 E9/L (ref 4.5–11.5)

## 2022-05-07 PROCEDURE — 6360000002 HC RX W HCPCS

## 2022-05-07 PROCEDURE — 6370000000 HC RX 637 (ALT 250 FOR IP): Performed by: INTERNAL MEDICINE

## 2022-05-07 PROCEDURE — 36415 COLL VENOUS BLD VENIPUNCTURE: CPT

## 2022-05-07 PROCEDURE — 2580000003 HC RX 258: Performed by: SURGERY

## 2022-05-07 PROCEDURE — 80048 BASIC METABOLIC PNL TOTAL CA: CPT

## 2022-05-07 PROCEDURE — 6370000000 HC RX 637 (ALT 250 FOR IP): Performed by: SURGERY

## 2022-05-07 PROCEDURE — 93971 EXTREMITY STUDY: CPT | Performed by: RADIOLOGY

## 2022-05-07 PROCEDURE — 2700000000 HC OXYGEN THERAPY PER DAY

## 2022-05-07 PROCEDURE — 85025 COMPLETE CBC W/AUTO DIFF WBC: CPT

## 2022-05-07 PROCEDURE — 6370000000 HC RX 637 (ALT 250 FOR IP): Performed by: STUDENT IN AN ORGANIZED HEALTH CARE EDUCATION/TRAINING PROGRAM

## 2022-05-07 PROCEDURE — 83735 ASSAY OF MAGNESIUM: CPT

## 2022-05-07 PROCEDURE — 2140000000 HC CCU INTERMEDIATE R&B

## 2022-05-07 PROCEDURE — 94660 CPAP INITIATION&MGMT: CPT

## 2022-05-07 PROCEDURE — 93971 EXTREMITY STUDY: CPT

## 2022-05-07 PROCEDURE — 99233 SBSQ HOSP IP/OBS HIGH 50: CPT | Performed by: INTERNAL MEDICINE

## 2022-05-07 RX ORDER — BUMETANIDE 1 MG/1
2 TABLET ORAL 2 TIMES DAILY
Status: DISCONTINUED | OUTPATIENT
Start: 2022-05-07 | End: 2022-05-12 | Stop reason: HOSPADM

## 2022-05-07 RX ADMIN — SUCRALFATE 1 G: 1 TABLET ORAL at 00:00

## 2022-05-07 RX ADMIN — BUMETANIDE 2 MG: 1 TABLET ORAL at 20:06

## 2022-05-07 RX ADMIN — PANTOPRAZOLE SODIUM 40 MG: 40 TABLET, DELAYED RELEASE ORAL at 05:39

## 2022-05-07 RX ADMIN — LEVOTHYROXINE SODIUM 200 MCG: 0.2 TABLET ORAL at 05:39

## 2022-05-07 RX ADMIN — SUCRALFATE 1 G: 1 TABLET ORAL at 12:48

## 2022-05-07 RX ADMIN — Medication 10 ML: at 09:04

## 2022-05-07 RX ADMIN — Medication 1000 UNITS: at 09:03

## 2022-05-07 RX ADMIN — Medication 10 ML: at 20:06

## 2022-05-07 RX ADMIN — BUMETANIDE 2 MG: 1 TABLET ORAL at 09:03

## 2022-05-07 RX ADMIN — LEVOTHYROXINE SODIUM 50 MCG: 0.2 TABLET ORAL at 05:38

## 2022-05-07 RX ADMIN — PANTOPRAZOLE SODIUM 40 MG: 40 TABLET, DELAYED RELEASE ORAL at 17:14

## 2022-05-07 RX ADMIN — SUCRALFATE 1 G: 1 TABLET ORAL at 05:39

## 2022-05-07 RX ADMIN — POLYETHYLENE GLYCOL 3350 17 G: 17 POWDER, FOR SOLUTION ORAL at 09:03

## 2022-05-07 RX ADMIN — SUCRALFATE 1 G: 1 TABLET ORAL at 17:14

## 2022-05-07 RX ADMIN — Medication 2000 MG: at 09:04

## 2022-05-07 ASSESSMENT — PAIN SCALES - GENERAL
PAINLEVEL_OUTOF10: 0

## 2022-05-07 ASSESSMENT — PAIN SCALES - WONG BAKER: WONGBAKER_NUMERICALRESPONSE: 0

## 2022-05-07 NOTE — PROGRESS NOTES
Department of Orthopedic Surgery  Resident Progress Note      SUBJECTIVE  Patient seen and examined resting comfortably. Movement of his arm awakened patient and experienced severe pain. Patient reports progressing pain and now predominately in his wrist.    OBJECTIVE    Physical    VITALS:  /62   Pulse 99   Temp 98.1 °F (36.7 °C) (Oral)   Resp 18   Wt 285 lb 9.6 oz (129.5 kg)   SpO2 98%   BMI 38.73 kg/m²   MUSCULOSKELETAL:     Right upper Extremity:  · Skin intact circumferentially, no evidence of acute skin trauma, no abrasion, scrapes or skin tears. · No erythema present. · Moderate swelling at the elbow,forearm and wrist  · +TTP olecranon and dorsal ulnar aspect of the wrist. There is mild tenderness when forearm is palpated. · A palpable mobile mass felt at the olecranon  · Compartments soft and compressible  · Severe pain with wrist extension, and will elbow flexion >90 degrees. · +AIN/PIN/Ulnar nerve function intact grossly  · +Radial pulse, Brisk Cap refill, hand warm and perfused  · Sensation intact to touch in radial/ulnar/median nerve distributions to hand    Data    CBC:   Lab Results   Component Value Date    WBC 10.0 05/06/2022    RBC 4.01 05/06/2022    HGB 7.4 05/06/2022    HCT 30.1 05/06/2022    MCV 75.1 05/06/2022    MCH 18.5 05/06/2022    MCHC 24.6 05/06/2022    RDW 25.8 05/06/2022     05/06/2022    MPV 10.1 05/06/2022     PT/INR:    Lab Results   Component Value Date    PROTIME 17.1 05/02/2022    INR 1.5 05/02/2022         ASSESSMENT  · Right arm cellulitis. PLAN    · Weight bearing as tolerated RLE  · Ancef ordered. · Consult to infectious disease for right upper extremity cellulitis. · Will monitor for clinical improvement. Will considered advanced imaging if there is no improvement. · CRP: 14.8; ESR 46. WBC 10. Hgb 7.4. overnight vitals stable. · Brandt pillow ordered. Patient is to maintain strict elevation with ice application.    · Deep venous thrombosis prophylaxis - per primary, early mobilization  · PT/OT when able  · Pain Control: IV and PO  · Continue current management.

## 2022-05-07 NOTE — PROGRESS NOTES
INPATIENT CARDIOLOGY FOLLOW-UP    Name: Carlton Hyde    Age: 78 y.o. Date of Admission: 5/2/2022 10:30 AM    Date of Service: 5/7/2022    Chief Complaint: Follow-up for acute superimposed upon chronic diastolic heart failure, acute superimposed upon chronic hypoxic respiratory failure, permanent atrial fibrillation, anemia with associated gastritis, acute kidney injury superimposed upon chronic kidney disease, chronic obstructive lung disease, moderate obesity, obstructive sleep apnea    Interim History: The patient's only present complaint is that of his ambulatory difficulties with no reported respiratory symptoms. Persistent volume overload is noted with an increase of serum creatinine levels and present fluid mobilization with present diuresis. Marginal rate control of his atrial fibrillation is presently noted. Endoscopic findings have been reviewed with a persistent anemia noted. Review of Systems: The remainder of a complete multisystem review including consitutional, central nervous, respiratory, circulatory, gastrointestinal, genitourinary, endocrinologic, hematologic, musculoskeletal and psychiatric are negative. Problem List:  Patient Active Problem List   Diagnosis    Hypertension    ZOE (obstructive sleep apnea)    Cardiomyopathy ([facility]    COPD (chronic obstructive pulmonary disease) (HCC)    Hypothyroidism    Chronic respiratory failure with hypoxia and hypercapnia (HCC)    Congestive heart failure (HCC)    Paroxysmal atrial fibrillation (HCC)    Anticoagulated    Gastrointestinal hemorrhage    Atrial fibrillation, chronic (HCC)    Pericardial effusion    JUDITH (acute kidney injury) ([facility]    Acute on chronic diastolic heart failure (HCC)    Acute on chronic respiratory failure (HCC)    Decompensated heart failure (HCC)    Microcytic anemia    CKD (chronic kidney disease) stage 3, GFR 30-59 ml/min (HCC)       Allergies:   Allergies   Allergen Reactions    Amlodipine Swelling    Isosorbide Mononitrate [Isosorbide Nitrate] Other (See Comments)     Migraine headaches    Lisinopril Rash     cough       Current Medications:  Current Facility-Administered Medications   Medication Dose Route Frequency Provider Last Rate Last Admin    ceFAZolin (ANCEF) 2000 mg in sterile water 20 mL IV syringe  2,000 mg IntraVENous Q8H Dru Quevedo DO        pantoprazole (PROTONIX) tablet 40 mg  40 mg Oral BID AC Karli Still MD   40 mg at 05/07/22 0539    sucralfate (CARAFATE) tablet 1 g  1 g Oral 4 times per day Shelbi Caro MD   1 g at 05/07/22 0539    polyethylene glycol (GLYCOLAX) packet 17 g  17 g Oral Daily Jamari Oneal MD   17 g at 05/06/22 7605    bumetanide (BUMEX) injection 2 mg  2 mg IntraVENous BID Shelbi Caro MD   2 mg at 05/1945    metoprolol succinate (TOPROL XL) extended release tablet 50 mg  50 mg Oral BID Shelbi Caro MD   50 mg at 05/1945    albuterol (PROVENTIL) nebulizer solution 2.5 mg  2.5 mg Nebulization Q6H PRN Shelbi Caro MD        Budeson-Glycopyrrol-Formoterol 160-9-4.8 MCG/ACT AERO 2 puff  2 puff Inhalation BID Shelbi Caro MD   2 puff at 05/06/22 1951    levothyroxine (SYNTHROID) tablet 200 mcg  200 mcg Oral Daily Shelbi Caro MD   200 mcg at 05/07/22 0539    [Held by provider] rivaroxaban (XARELTO) tablet 15 mg  15 mg Oral Dinner Karli Still MD        sodium chloride (OCEAN, BABY AYR) 0.65 % nasal spray 1 spray  1 spray Nasal Daily Shelbi Caro MD   1 spray at 05/06/22 0779    doxazosin (CARDURA) tablet 2 mg  2 mg Oral Daily Shelbi Caro MD   2 mg at 05/06/22 1371    Vitamin D (CHOLECALCIFEROL) tablet 1,000 Units  1,000 Units Oral Daily Shelbi Caro MD   1,000 Units at 05/06/22 4401    sodium chloride flush 0.9 % injection 5-40 mL  5-40 mL IntraVENous 2 times per day Shelbi Caro MD   10 mL at 05/06/22 1952    sodium chloride flush 0.9 % injection 10 mL  10 mL IntraVENous PRN Shelbi Caro MD  0.9 % sodium chloride infusion   IntraVENous PRN Janis Garcia MD        ondansetron (ZOFRAN-ODT) disintegrating tablet 4 mg  4 mg Oral Q8H PRN Janis Garcia MD        Or    [facility]ron [facility]) injection 4 mg  4 mg IntraVENous Q6H PRN Janis Garcia MD   4 mg at 05/1945    magnesium hydroxide (MILK OF MAGNESIA) 400 MG/5ML suspension 30 mL  30 mL Oral Daily PRN Janis Garcia MD        acetaminophen (TYLENOL) tablet 650 mg  650 mg Oral Q6H PRN Janis Garcia MD   650 mg at 05/05/22 1423    Or    acetaminophen (TYLENOL) suppository 650 mg  650 mg Rectal Q6H PRN Janis Garcia MD        fluticasone (FLONASE) 50 MCG/ACT nasal spray 2 spray  2 spray Nasal Daily Janis Garcia MD   2 spray at 05/06/22 3822    levothyroxine (SYNTHROID) tablet 50 mcg  50 mcg Oral Daily Janis Garcia MD   50 mcg at 05/07/22 0538      sodium chloride         Physical Exam:  /62   Pulse 99   Temp 98.1 °F (36.7 °C) (Oral)   Resp 18   Wt 285 lb 9.6 oz (129.5 kg)   SpO2 98%   BMI 38.73 kg/m²   Weight change: -1.6 oz (-0.045 kg)  Wt Readings from Last 3 Encounters:   05/07/22 285 lb 9.6 oz (129.5 kg)   08/13/21 270 lb (122.5 kg)   06/27/20 242 lb (109.8 kg)     The patient is awake, alert and in no discomfort or distress. No gross musculoskeletal deformity is present. No significant skin or nail changes are present. Gross examination of head, eyes, nose and throat are negative. Jugular venous pressure is normal and no carotid bruits are present. Normal respiratory effort is noted with no accessory muscle usage present. Lung fields are clear to ascultation. Cardiac examination is notable for an irregular rhythm with no palpable thrill. No gallop rhythm or cardiac murmur are identified. A benign abdominal examination is present with the exception of obesity no masses or organomegaly. Intact pulses are present throughout all extremities and chronic lymphedema and venous stasis is present.  No focal neurologic deficits are present. Intake/Output:    Intake/Output Summary (Last 24 hours) at 5/7/2022 0801  Last data filed at 5/7/2022 0541  Gross per 24 hour   Intake 440 ml   Output 900 ml   Net -460 ml     No intake/output data recorded. Laboratory Tests:  Lab Results   Component Value Date    CREATININE 2.1 (H) 05/07/2022    BUN 36 (H) 05/07/2022     05/07/2022    K 4.9 05/07/2022    CL 90 (L) 05/07/2022    CO2 40 (H) 05/07/2022     No results for input(s): CKTOTAL, CKMB in the last 72 hours. Invalid input(s): TROPONONI  No results found for: BNP  Lab Results   Component Value Date    WBC 11.0 05/07/2022    RBC 4.12 05/07/2022    HGB 7.6 05/07/2022    HCT 30.8 05/07/2022    MCV 74.8 05/07/2022    MCH 18.4 05/07/2022    MCHC 24.7 05/07/2022    RDW 26.5 05/07/2022     05/07/2022    MPV 9.8 05/07/2022     No results for input(s): ALKPHOS, ALT, AST, PROT, BILITOT, BILIDIR, LABALBU in the last 72 hours. Lab Results   Component Value Date    MG 2.2 05/07/2022     Lab Results   Component Value Date    PROTIME 17.1 05/02/2022    INR 1.5 05/02/2022     Lab Results   Component Value Date    TSH 1.090 06/19/2020     No components found for: CHLPL  Lab Results   Component Value Date    TRIG 49 05/03/2022    TRIG 51 03/05/2016     Lab Results   Component Value Date    HDL 37 05/03/2022    HDL 51 03/05/2016     Lab Results   Component Value Date    LDLCALC 44 05/03/2022    [address] 03/05/2016       Cardiac Tests:  Telemetry findings reviewed: atrial fibrillation with a mean ventricular response of approximately  bpm, no new tachy/bradyarrhythmias overnight      ASSESSMENT / PLAN: On a clinical basis, the patient continues to manifest evidence of volume overload in the face of his chronic diastolic heart failure as well as that of marginal rate control of his atrial fibrillation.   Additional fluid mobilization will be necessary with plans of conversion of intravenous diuretics that of oral administration in light of increases of serum creatinine levels with needs of continued careful monitoring of his volume status as well as that of renal function and electrolytes. Over the next 24 hours. A rate control and anticoagulation strategy will be maintained in the face of his atrial fibrillation with present withholding of anticoagulation based on his persistent anemia and gastritis with plans of modification of his beta-blocker to achieve improve heart rate control and discontinuation of doxazosin. Additional management of his chronic obstructive lung disease will be deferred to primary care with additional needs of compliance with nocturnal CPAP to assist in management both of his diastolic heart failure and atrial arrhythmias in addition to that of appropriate lifestyle modification to achieve weight reduction. Continue aggressive risk factor modification of blood pressure and serum lipids will be essential to reducing risk of future atherosclerotic development including that of involvement of his thoracic aorta      Note: This report was completed utilizing computer voice recognition software. Every effort has been made to ensure accuracy, however; inadvertent computerized transcription errors may be present. Gino Cabrera.  Kay Hernandez, [address]

## 2022-05-07 NOTE — CONSULTS
[address] [place] [address] [facility]    Consultation Note     Admit Date: 5/2/2022 10:30 AM    Reason for Consult:   Cellulitis    Attending Physician:  Agus Sloan MD     Chief Complaint: right UE pain     HISTORY OF PRESENT ILLNESS:   The patient is a 78 y.o.  male not known to the Infectious Diseases service. The patient was admitted on 5/2 for possible CHF exacerbation with shortness of breath, leg and testicular swelling for almost 3 weeks and also acute anemia. He did undergo EGD which showed superficial antral ulcers on 5/4. He says his right arm started being painful after having BP cuff during the procedure couple days ago,. Its swollen and painful all the way from elbow to the fingers. No fever or cough or SOB or abdominal pain or diarrhea. Since admission he is afebrile hemodynamically stable saturating well on 4 L nasal cannula. His white count is normal at 11 today. Hemoglobin is 7.6 platelets are 142 BNP is 5000 CRP is 14.8. He has JUDITH now worsened from 1.6-2.1 with BUN of 36. Ultrasound duplex of the right upper extremity did not show any DVT and x-ray of the elbow right and radius ulna did not show any bony abnormalities. He does have some soft tissue edema in the forearm. Is currently on IV cefazolin 2 g every 8 starting today and I got consulted for further recommendations.     Past Medical History:        Diagnosis Date    Abdominal aortic aneurysm (AAA) >39 mm diameter ([facility])     Anemia     Cardiomyopathy ([address]     Carpal tunnel syndrome     CHF (congestive heart failure) ([facility])     echo 3/5/16 ef 39%, stage 1 diastolic    COPD (chronic obstructive pulmonary disease) ([facility])     GERD (gastroesophageal reflux disease)     Hx of colonic polyps     Hypertension     Hypothyroidism     Rectal bleeding     Respiratory neoplasm     Sleep apnea     Tobacco use      Past Surgical History:        Procedure Laterality Date    ABDOMEN SURGERY      CARDIAC CATHETERIZATION      CHOLECYSTECTOMY      COLONOSCOPY      COLONOSCOPY N/A 2018    COLONOSCOPY DIAGNOSTIC performed by Sherri Calderon MD at [address] [facility], ESOPHAGUS      ENDOSCOPY, COLON, DIAGNOSTIC      FRACTURE SURGERY      JOINT REPLACEMENT      SALIVARY GLAND SURGERY      UPPER GASTROINTESTINAL ENDOSCOPY N/A 2018    EGD DIAGNOSTIC ONLY performed by Sherri Calderon MD at [address] [facility] N/A 2022    EGD BIOPSY performed by Janis Garcia MD at [facility]. [address]       Current Medications:   Scheduled Meds:   ceFAZolin  2,000 mg IntraVENous Q8H    bumetanide  2 mg Oral BID    metoprolol succinate  75 mg Oral BID    pantoprazole  40 mg Oral BID AC    sucralfate  1 g Oral 4 times per day    polyethylene glycol  17 g Oral Daily    Budeson-Glycopyrrol-Formoterol  2 puff Inhalation BID    levothyroxine  200 mcg Oral Daily    [Held by provider] rivaroxaban  15 mg Oral Dinner    sodium chloride  1 spray Nasal Daily    Vitamin D  1,000 Units Oral Daily    sodium chloride flush  5-40 mL IntraVENous 2 times per day    fluticasone  2 spray Nasal Daily    levothyroxine  50 mcg Oral Daily     Continuous Infusions:   sodium chloride       PRN Meds:albuterol, sodium chloride flush, sodium chloride, ondansetron **OR** ondansetron, magnesium hydroxide, acetaminophen **OR** acetaminophen    Allergies:  Amlodipine, Isosorbide mononitrate [isosorbide nitrate], and Lisinopril    Social History:   Social History     Socioeconomic History    Marital status:      Spouse name: Not on file    Number of children: Not on file    Years of education: Not on file    Highest education level: Not on file   Occupational History    Not on file   Tobacco Use    Smoking status: Former Smoker     Quit date: 2000     Years since quittin.3    Smokeless tobacco: Never Used   Substance and Sexual Activity    Alcohol use: Yes     Alcohol/week: 21.0 standard drinks     Types: 21 Shots of liquor per week     Comment: hasn't drank in three weeks    Drug use: Not on file    Sexual activity: Not on file   Other Topics Concern    Not on file   Social History Narrative    Not on file     Social Determinants of Health     Financial Resource Strain:     Difficulty of Paying Living Expenses: Not on file   Food Insecurity:     Worried About Running Out of Food in the Last Year: Not on file    Evonne of Food in the Last Year: Not on file   Transportation Needs:     Lack of Transportation (Medical): Not on file    Lack of Transportation (Non-Medical): Not on file   Physical Activity:     Days of Exercise per Week: Not on file    Minutes of Exercise per Session: Not on file   Stress:     Feeling of Stress : Not on file   Social Connections:     Frequency of Communication with Friends and Family: Not on file    Frequency of Social Gatherings with Friends and Family: Not on file    Attends Caoda[employer] Services: Not on file    Active Member of [address] [place] [address] [employer] or Organizations: Not on file    Attends Club or Organization Meetings: Not on file    Marital Status: Not on file   Intimate Partner Violence:     Fear of Current or Ex-Partner: Not on file    Emotionally Abused: Not on file    Physically Abused: Not on file    Sexually Abused: Not on file   Housing Stability:     Unable to Pay for Housing in the Last Year: Not on file    Number of [place] in the Last Year: Not on file    Unstable Housing in the Last Year: Not on file       Family History:   No family history on file. . Otherwise non-pertinent to the chief complaint. REVIEW OF SYSTEMS:    As mentioned in HPI, all other systems negative. PHYSICAL EXAM:    Vitals:    BP 95/71   Pulse 97   Temp 98.7 °F (37.1 °C) (Temporal)   Resp 20   Wt 285 lb 9.6 oz (129.5 kg)   SpO2 100%   BMI 38.73 kg/m²   Constitutional: The patient is awake, alert, and oriented. Skin: Warm and dry. No jaundice. very small decub wound with no sign of infection. HEENT: Eyes show round, and reactive pupils. Moist mucous membranes, no ulcerations, no thrush. Neck: Supple to movements. No lymphadenopathy. Chest: clear to auscultation anteriorly. Cardiovascular: S1 and S2 are rhythmic and regular. No murmurs appreciated. Abdomen: soft non tender. Genitourinary: severe penile and testicular swelling. Extremities: No clubbing, no cyanosis, edema bilateral LE  Musculoskeletal: right UE: 3+ edema with minimal redness at the dependent portion. Neurological: alert, oriented x3  Lines: peripheral      CBC+dif:  Recent Labs     05/05/22  0455 05/05/22  0455 05/06/22  0457 05/06/22  0457 05/07/22  0556   WBC 9.1  --  10.0  --  11.0   HGB 7.2*  7.1*   < > 7.4*   < > 7.6*   HCT 28.6*  28.2*   < > 30.1*   < > 30.8*   MCV 73.7*   < > 75.1*   < > 74.8*      < > 259   < > 283   NEUTROABS 7.92*   < > 7.73*   < > 9.57*    < > = values in this interval not displayed.      Lab Results   Component Value Date    CRP 14.8 (H) 05/06/2022     No results found for: RUST  Lab Results   Component Value Date    SEDRATE 46 (H) 05/06/2022     Lab Results   Component Value Date    ALT 16 08/13/2021    AST 23 08/13/2021    ALKPHOS 88 08/13/2021    BILITOT 0.7 08/13/2021     Lab Results   Component Value Date     05/07/2022    K 4.9 05/07/2022    K 4.0 05/02/2022    CL 90 05/07/2022    CO2 40 05/07/2022    BUN 36 05/07/2022    CREATININE 2.1 05/07/2022    GFRAA 37 05/07/2022    LABGLOM 31 05/07/2022    GLUCOSE 104 05/07/2022    PROT 6.6 08/13/2021    LABALBU 3.4 08/13/2021    CALCIUM 8.7 05/07/2022    BILITOT 0.7 08/13/2021    ALKPHOS 88 08/13/2021    AST 23 08/13/2021    ALT 16 08/13/2021       Lab Results   Component Value Date    PROTIME 17.1 05/02/2022    INR 1.5 05/02/2022       Lab Results   Component Value Date    TSH 1.090 06/19/2020       Lab Results   Component Value Date    COLORU Yellow 05/02/2022    PHUR 7.0 05/02/2022    WBCUA NONE 05/02/2022    RBCUA 0-1 05/02/2022    BACTERIA NONE SEEN 05/02/2022    CLARITYU Clear 05/02/2022    SPECGRAV 1.010 05/02/2022    LEUKOCYTESUR Negative 05/02/2022    UROBILINOGEN 0.2 05/02/2022    BILIRUBINUR Negative 05/02/2022    BLOODU TRACE-INTACT 05/02/2022    GLUCOSEU Negative 05/02/2022       Lab Results   Component Value Date    PSF0JOE 56.4 06/23/2020     Radiology:  US DUP UPPER EXTREMITY RIGHT VENOUS   Final Result   Within the visualized vessels there is no evidence for deep venous   thrombosis               XR RADIUS ULNA RIGHT (2 VIEWS)   Final Result   No acute fracture or malalignment. Degenerative changes. XR ELBOW RIGHT (2 VIEWS)   Final Result   Soft tissue edema dorsal to the elbow joint adjacent to the proximal forearm. Elevation of the anterior and posterior fat pads consistent with effusion. XR CHEST PORTABLE   Final Result   Suspect early CHF. Microbiology:  Pending  No results for input(s): BC in the last 72 hours. No results for input(s): ORG in the last 72 hours. No results for input(s): Loren Patient in the last 72 hours. No results for input(s): STREPNEUMAGU in the last 72 hours. No results for input(s): LP1UAG in the last 72 hours. No results for input(s): ASO in the last 72 hours. No results for input(s): CULTRESP in the last 72 hours. Assessment:  · Right UE swelling: no sign of cellulitis. Its either bruising in soft tissues from BP cuff at the time of EGD vs bursitis at the elbow. Orthopedic surgery following. Plan:    · Stop cefazolin. · Will watch him clinically. · Appreciate Orthopedics consult. · Will follow with you    Thank you for having us see this patient in consultation. I will be discussing this case with the treating physicians.       Electronically signed by Jonas Correia MD on 5/7/2022 at 1:39 PM

## 2022-05-07 NOTE — CONSULTS
Orthopaedic Consultation  JONATHAN Trimble MD    Reason for Consult:  Right elbow pain      HISTORY OF PRESENT ILLNESS:                                  Patient is a 78 y.o. male who presents with elbow pain that he states started two days prior after his EGD procedure. Onset was sudden and has progressed in severity over the course of the 2 days. Patient is reporting decrease range of motion with some muscle weakness. Pain is reported to be severe, non radiating and sharp in nature. Patient denies trauma and indicated that he had gout several years ago. Patient is right hand dominant. Anticoagulation - Xarelto. Patient has a significant history of Abdominal aneurysm, CHF, COPD, Rectal bleeding. Patient is admitted to the hospital for acute CHF. Previous Orthopedic Surgeon - no  Denies numbness/tingling/paresthesias. Denies any other orthopedic complaints at this time.      Past Medical History:    Past Medical History             Diagnosis Date    Abdominal aortic aneurysm (AAA) >39 mm diameter (HCC)      Anemia      Cardiomyopathy (HCC)      Carpal tunnel syndrome      CHF (congestive heart failure) ([facility]       echo 3/5/16 ef 28%, stage 1 diastolic    COPD (chronic obstructive pulmonary disease) (HCC)      GERD (gastroesophageal reflux disease)      Hx of colonic polyps      Hypertension      Hypothyroidism      Rectal bleeding      Respiratory neoplasm      Sleep apnea      Tobacco use           Past Surgical History:    Past Surgical History             Procedure Laterality Date    ABDOMEN SURGERY        CARDIAC CATHETERIZATION        CHOLECYSTECTOMY        COLONOSCOPY        COLONOSCOPY N/A 12/9/2018     COLONOSCOPY DIAGNOSTIC performed by Avery Hooker MD at [facility], ESOPHAGUS        ENDOSCOPY, COLON, DIAGNOSTIC        FRACTURE SURGERY        JOINT REPLACEMENT        SALIVARY GLAND SURGERY        UPPER GASTROINTESTINAL ENDOSCOPY N/A 12/9/2018     EGD DIAGNOSTIC ONLY performed by Tico Jonas MD at [facility] N/A 5/4/2022     EGD BIOPSY performed by Deneen Slaughter MD at [address] [place] [address]             Current Medications:     Current Hospital Medications   Current Facility-Administered Medications: sucralfate (CARAFATE) tablet 1 g, 1 g, Oral, 4 times per day  polyethylene glycol (GLYCOLAX) packet 17 g, 17 g, Oral, Daily  pantoprazole (PROTONIX) 40 mg in sodium chloride (PF) 10 mL injection, 40 mg, IntraVENous, Q12H  bumetanide (BUMEX) injection 2 mg, 2 mg, IntraVENous, BID  metoprolol succinate (TOPROL XL) extended release tablet 50 mg, 50 mg, Oral, BID  albuterol (PROVENTIL) nebulizer solution 2.5 mg, 2.5 mg, Nebulization, Q6H PRN  Budeson-Glycopyrrol-Formoterol 160-9-4.8 MCG/ACT AERO 2 puff, 2 puff, Inhalation, BID  levothyroxine (SYNTHROID) tablet 200 mcg, 200 mcg, Oral, Daily  [Held by provider] rivaroxaban (XARELTO) tablet 15 mg, 15 mg, Oral, Dinner  sodium chloride (OCEAN, BABY AYR) 0.65 % nasal spray 1 spray, 1 spray, Nasal, Daily  doxazosin (CARDURA) tablet 2 mg, 2 mg, Oral, Daily  Vitamin D (CHOLECALCIFEROL) tablet 1,000 Units, 1,000 Units, Oral, Daily  sodium chloride flush 0.9 % injection 5-40 mL, 5-40 mL, IntraVENous, 2 times per day  sodium chloride flush 0.9 % injection 10 mL, 10 mL, IntraVENous, PRN  0.9 % sodium chloride infusion, , IntraVENous, PRN  ondansetron (ZOFRAN-ODT) disintegrating tablet 4 mg, 4 mg, Oral, Q8H PRN **OR** ondansetron (ZOFRAN) injection 4 mg, 4 mg, IntraVENous, Q6H PRN  magnesium hydroxide (MILK OF MAGNESIA) 400 MG/5ML suspension 30 mL, 30 mL, Oral, Daily PRN  acetaminophen (TYLENOL) tablet 650 mg, 650 mg, Oral, Q6H PRN **OR** acetaminophen (TYLENOL) suppository 650 mg, 650 mg, Rectal, Q6H PRN  fluticasone (FLONASE) 50 MCG/ACT nasal spray 2 spray, 2 spray, Nasal, Daily  levothyroxine (SYNTHROID) tablet 50 mcg, 50 mcg, Oral, Daily     Allergies:  Amlodipine, Isosorbide mononitrate [isosorbide nitrate], and Lisinopril     Social History:   TOBACCO:   reports that he quit smoking about 22 years ago. He has never used smokeless tobacco.  ETOH:   reports current alcohol use of about 21.0 standard drinks of alcohol per week. DRUGS:   has no history on file for drug use. ACTIVITIES OF DAILY LIVING:    OCCUPATION:    Family History:   Family History   No family history on file. REVIEW OF SYSTEMS:  CONSTITUTIONAL:  negative for  fevers, chills  EYES:  negative for blurred vision, visual disturbance  HEENT:  negative for  hearing loss, voice change  RESPIRATORY:  negative for  dyspnea, wheezing  CARDIOVASCULAR:  negative for  chest pain, palpitations  GASTROINTESTINAL:  negative for nausea, vomiting  GENITOURINARY:  negative for frequency, urinary incontinence  HEMATOLOGIC/LYMPHATIC:  negative for bleeding and petechiae  MUSCULOSKELETAL:  positive for  pain, joint swelling and decreased range of motion  NEUROLOGICAL:  negative for headaches, dizziness  BEHAVIOR/PSYCH:  negative for increased agitation and anxiety     PHYSICAL EXAM:    VITALS:  BP 93/72   Pulse 104   Temp 96.9 °F (36.1 °C) (Tympanic)   Resp 19   Wt 285 lb 11.2 oz (129.6 kg)   SpO2 100%   BMI 38.75 kg/m²   CONSTITUTIONAL:  awake, alert, cooperative, no apparent distress, and appears stated age  MUSCULOSKELETAL:  Right upper Extremity:   Skin intact circumferentially, no evidence of acute skin trauma, no abrasion, scrapes or skin tears. No erythema present. Mild joint swelling seen. +TTP olecranon and distal radius and ulna dorsally. Mild pain at the forearm  A palpable mobile mass felt at the olecranon  Compartments soft and compressible  +AIN/PIN/Ulnar nerve function intact grossly  +Radial pulse, Brisk Cap refill, hand warm and perfused  Sensation intact to touch in radial/ulnar/median nerve distributions to hand, however mildly diminished to the dorsum hand.  Patient states this is his baseline     Secondary Exam: leftUE: No obvious signs of trauma. Ecchymosis present at the antecubital fossa. This could be secondary to IV line insertion. -TTP to fingers, hand, wrist, forearm, elbow, humerus, shoulder or clavicle. -- Patient able to flex/extend fingers, wrist, elbow and shoulder with active and passive ROM without pain, +2/4 Radial pulse, cap refill <3sec, +AIN/PIN/Radial/Ulnar/Median N, distal sensation grossly intact to C4-T1 dermatomes, compartments soft and compressible. bilateralLE: No obvious signs of trauma. -TTP to foot, ankle, leg, knee, thigh, hip.-- Patient able to flex/extend toes, ankle, knee and hip with active and passive ROM without pain,+2/4 DP & PT pulses, cap refill <3sec, +5/5 PF/DF/EHL, distal sensation grossly intact to L4-S1 dermatomes, compartments soft and compressible. Pelvis: -TTP, -Log roll, -Heel strike      DATA:    CBC:         Lab Results   Component Value Date     WBC 10.0 05/06/2022     RBC 4.01 05/06/2022     HGB 7.4 05/06/2022     HCT 30.1 05/06/2022     MCV 75.1 05/06/2022     MCH 18.5 05/06/2022     MCHC 24.6 05/06/2022     RDW 25.8 05/06/2022      05/06/2022     MPV 10.1 05/06/2022      PT/INR:          Lab Results   Component Value Date     PROTIME 17.1 05/02/2022     INR 1.5 05/02/2022      CRP/ESR: No results found for: CRP, SEDRATE  Lactic Acid :         Lab Results   Component Value Date     LACTA 1.2 12/07/2018         Radiology Review:  05/06/22 - XR right elbow demonstrate degenerative changes at the olecranon. Osteophytic changes at the ulnohumeral joint. No other fractures or dislocation noted. IMPRESSION:   Right elbow olecranon bursitis   Right elbow gout - inflammatory arthritis     PLAN:  WBAT - RUE  Infectious markers ordered  XR right wrist and forearm ordered. Pain Control primary  PT/OT when able  Continue ice and elevation to decrease swelling  Continue current management.  Will consider advance imaging pending results from infectious markers. Continue to maintain strict ice, compression and elevation for pain and edema control. Follow up in the office as needed    I spent over 50 minutes reviewing the EMR, radiographs, examining the patient, and discussing the injury and treatment plan with the patient.

## 2022-05-07 NOTE — PROGRESS NOTES
Hospitalist Progress Note      PCP: Fernando Herrera DO    Date of Admission: 5/2/2022    Hospital Course:     78y.o. year old male Hx Abdominal aortic aneurysm (AAA) >39 mm diameter (HCC), Anemia, Cardiomyopathy, Carpal tunnel syndrome, CHF (congestive heart failure) , COPD, GERD, Hx of colonic polyps, Hypertension, Hypothyroidism, Rectal bleeding, Respiratory neoplasm, Sleep apnea, and Tobacco use presents with increased shortness of breath that he is experienced over the course of the last month. Patient lives alone and independently. Patient admits to chronic oxygen use and states he follows a pulmonologist and cardiologist at the [place]. It is chronic nature, worsened by activity, relieved only minimally by rest.  Patient also presents with bilateral 3+ pitting edema to lower extremities and scrotum, that he says has gotten worse over the last month. Patient also states that he has been experiencing dark tarry stools. Patient also presents with A. fib, and denies any history of having it, but takes Xarelto. In ER labs pertinent for elevated BUN of 24 and creatinine of 1.6 it seems to be patient's baseline. Elevated proBNP at 2562, elevated troponin, initially 69 elevated to 72. CBC shows a hemoglobin of 7.3, hemoglobin on a previous admission 8/13/2021 shows a hemoglobin of 14. 3. ABG showed pH of 7.361, CO2 73.1 bicarb 40.5, compensated respiratory acidosis. Patient was placed on BiPAP. A chest x-ray was completed and shows that the heart was enlarged. Pulmonary vessels are congested, mild opacity at the lung bases. Suspect early CHF. \"    Subjective:   He is tired today. Sleepy. He has right arm in brace.       Medications:  Reviewed    Infusion Medications    sodium chloride       Scheduled Medications    ceFAZolin  2,000 mg IntraVENous Q8H    bumetanide  2 mg Oral BID    metoprolol succinate  75 mg Oral BID    pantoprazole  40 mg Oral BID AC    sucralfate  1 g Oral 4 times per day    polyethylene glycol  17 g Oral Daily    Budeson-Glycopyrrol-Formoterol  2 puff Inhalation BID    levothyroxine  200 mcg Oral Daily    [Held by provider] rivaroxaban  15 mg Oral Dinner    sodium chloride  1 spray Nasal Daily    Vitamin D  1,000 Units Oral Daily    sodium chloride flush  5-40 mL IntraVENous 2 times per day    fluticasone  2 spray Nasal Daily    levothyroxine  50 mcg Oral Daily     PRN Meds: albuterol, sodium chloride flush, sodium chloride, ondansetron **OR** ondansetron, magnesium hydroxide, acetaminophen **OR** acetaminophen      Intake/Output Summary (Last 24 hours) at 5/7/2022 1139  Last data filed at 5/7/2022 0903  Gross per 24 hour   Intake 680 ml   Output 900 ml   Net -220 ml       Physical Exam Performed:    BP 89/72   Pulse 97   Temp 98.7 °F (37.1 °C) (Temporal)   Resp 20   Wt 285 lb 9.6 oz (129.5 kg)   SpO2 100%   BMI 38.73 kg/m²     General appearance: No apparent distress, appears stated age and cooperative. Obese, nasal 02  HEENT: Pupils equal, round, and reactive to light. Conjunctivae/corneas clear. Neck: Supple, with full range of motion. No jugular venous distention. Trachea midline. Respiratory:  Normal respiratory effort. Clear to auscultation, bilaterally without Rales/Wheezes/Rhonchi. Cardiovascular: Regular rate and rhythm with normal S1/S2 without murmurs, rubs or gallops. Abdomen: Soft, non-tender, non-distended with normal bowel sounds. Musculoskeletal: No clubbing, cyanosis. +chronic venous stasis. +chronic edema. R elbow with edema and limited ROM  Skin: Skin color, texture, turgor normal.  No rashes or lesions.   Neurologic: grossly non-focal.  Psychiatric: Alert and oriented, thought content appropriate, normal insight      Labs:   Recent Labs     05/05/22 0455 05/06/22 0457 05/07/22  0556   WBC 9.1 10.0 11.0   HGB 7.2*  7.1* 7.4* 7.6*   HCT 28.6*  28.2* 30.1* 30.8*    259 283     Recent Labs     05/05/22  0455 05/06/22 0457 05/07/22  0556    137 138   K 4.1 4.0 4.9   CL 94* 88* 90*   CO2 42* 41* 40*   BUN 23 27* 36*   CREATININE 1.7* 1.8* 2.1*   CALCIUM 8.6 8.4* 8.7     No results for input(s): AST, ALT, BILIDIR, BILITOT, ALKPHOS in the last 72 hours. No results for input(s): INR in the last 72 hours. No results for input(s): [place] in the last 72 hours. Urinalysis:      Lab Results   Component Value Date    NITRU Negative 05/02/2022    WBCUA NONE 05/02/2022    BACTERIA NONE SEEN 05/02/2022    RBCUA 0-1 05/02/2022    BLOODU TRACE-INTACT 05/02/2022    SPECGRAV 1.010 05/02/2022    GLUCOSEU Negative 05/02/2022       Radiology:  US DUP UPPER EXTREMITY RIGHT VENOUS   Final Result   Within the visualized vessels there is no evidence for deep venous   thrombosis               XR RADIUS ULNA RIGHT (2 VIEWS)   Final Result   No acute fracture or malalignment. Degenerative changes. XR ELBOW RIGHT (2 VIEWS)   Final Result   Soft tissue edema dorsal to the elbow joint adjacent to the proximal forearm. Elevation of the anterior and posterior fat pads consistent with effusion. XR CHEST PORTABLE   Final Result   Suspect early CHF.                  Assessment/Plan:    Active Hospital Problems    Diagnosis     Hypothyroidism [E03.9]      Priority: High    Decompensated heart failure (HCC) [I50.9]      Priority: Medium    Microcytic anemia [D50.9]      Priority: Medium    CKD (chronic kidney disease) stage 3, GFR 30-59 ml/min ([facility]) [N18.30]      Priority: Medium    Hypertension [I10]      Priority: Low    ZOE (obstructive sleep apnea) [G47.33]      Priority: Low    COPD (chronic obstructive pulmonary disease) ([facility]) [J44.9]      Priority: Low    Chronic respiratory failure with hypoxia and hypercapnia ([facility]) [J96.11, J96.12]     Paroxysmal atrial fibrillation ([facility]) [I48.0]     Anticoagulated [Z79.01]     Gastrointestinal hemorrhage [K92.2]      Decompensated heart failure on EF 55% on echocardiogram on 6/19/2020  -Consulted cardiology. Changed from IV diuretics to oral diuretics. He has alkalosis. -Echocardiogram 5/3 shows EF 60-65%,severe LVH,dilated RV with reduced EF.   -Daily weights  -Strict intake and output    Acute gastrointestinal bleed with associated blood loss anemia   -Hgb 7.3 on presentation. Transfused PRBC on 5/3/22. Xarelto on hold. -Iron deficient. Iron supplementon discharge. -DC Serial H&H. Hgb is stable. -Consulted general surgery. EGD 5/4 showed superficial antral ulcers. Continue PPI. Ok for anticoagulation. FU office. Acute on chronic hypoxic respiratory failure-due to CHF  -Currently on 4L NC. Pt is on 4L NC at home. Atrial fibrillation  -Rate controlled  -Xarelto was held for anemia. Restart when ok with Cardiology    RUE pain after trauma  -Xrays of elbow and forearm Soft tissue edema dorsal to the elbow joint adjacent to the proximal forearm and Elevation of the anterior and posterior fat pads consistent with effusion. Consult Ortho. ?Bursitis?cellulitis. ESR/CRP elevated. No DVT in RUE.WBAT. Brandt pillow ordered. Ancef ordered. ID consult for cellulitis. -Pain control    JUDITH on CKD stage 3(baseline Cr 1.7-1.8)-due to diuresis  -Cr increasing. Diuretics changed to oral.    COPD  -Continue inhalers    Hypertension  -BP borderline, will monitor    Hypothyroidism  -Continue Synthroid    History of sleep apnea  -BiPAP    Deconditioning   -PT/OT      DVT Prophylaxis: Xarelto on hold. SCDs. Diet: ADULT DIET; Regular;  Low Sodium (2 gm)  Code Status: Full Code    PT/OT Eval Status: as above    Dispo - RICHELLE in 2-3days pending improvement of RUE swelling    Kash Gomez MD

## 2022-05-08 ENCOUNTER — APPOINTMENT (OUTPATIENT)
Dept: GENERAL RADIOLOGY | Age: 77
DRG: 291 | End: 2022-05-08
Payer: OTHER GOVERNMENT

## 2022-05-08 LAB
ANION GAP SERPL CALCULATED.3IONS-SCNC: 10 MMOL/L (ref 7–16)
ANISOCYTOSIS: ABNORMAL
BASOPHILS ABSOLUTE: 0.03 E9/L (ref 0–0.2)
BASOPHILS RELATIVE PERCENT: 0.4 % (ref 0–2)
BUN BLDV-MCNC: 43 MG/DL (ref 6–23)
CALCIUM SERPL-MCNC: 8.7 MG/DL (ref 8.6–10.2)
CHLORIDE BLD-SCNC: 93 MMOL/L (ref 98–107)
CO2: 40 MMOL/L (ref 22–29)
CREAT SERPL-MCNC: 2.1 MG/DL (ref 0.7–1.2)
EOSINOPHILS ABSOLUTE: 0.15 E9/L (ref 0.05–0.5)
EOSINOPHILS RELATIVE PERCENT: 1.8 % (ref 0–6)
GFR AFRICAN AMERICAN: 37
GFR NON-AFRICAN AMERICAN: 31 ML/MIN/1.73
GLUCOSE BLD-MCNC: 92 MG/DL (ref 74–99)
HCT VFR BLD CALC: 31.7 % (ref 37–54)
HEMOGLOBIN: 7.8 G/DL (ref 12.5–16.5)
HYPOCHROMIA: ABNORMAL
IMMATURE GRANULOCYTES #: 0.04 E9/L
IMMATURE GRANULOCYTES %: 0.5 % (ref 0–5)
LYMPHOCYTES ABSOLUTE: 0.67 E9/L (ref 1.5–4)
LYMPHOCYTES RELATIVE PERCENT: 8.1 % (ref 20–42)
MCH RBC QN AUTO: 18.8 PG (ref 26–35)
MCHC RBC AUTO-ENTMCNC: 24.6 % (ref 32–34.5)
MCV RBC AUTO: 76.6 FL (ref 80–99.9)
MONOCYTES ABSOLUTE: 0.9 E9/L (ref 0.1–0.95)
MONOCYTES RELATIVE PERCENT: 10.9 % (ref 2–12)
NEUTROPHILS ABSOLUTE: 6.45 E9/L (ref 1.8–7.3)
NEUTROPHILS RELATIVE PERCENT: 78.3 % (ref 43–80)
OVALOCYTES: ABNORMAL
PDW BLD-RTO: 26.2 FL (ref 11.5–15)
PLATELET # BLD: 262 E9/L (ref 130–450)
PMV BLD AUTO: 10 FL (ref 7–12)
POIKILOCYTES: ABNORMAL
POLYCHROMASIA: ABNORMAL
POTASSIUM SERPL-SCNC: 4.6 MMOL/L (ref 3.5–5)
PRO-BNP: 7632 PG/ML (ref 0–450)
RBC # BLD: 4.14 E12/L (ref 3.8–5.8)
SCHISTOCYTES: ABNORMAL
SODIUM BLD-SCNC: 143 MMOL/L (ref 132–146)
STOMATOCYTES: ABNORMAL
TEAR DROP CELLS: ABNORMAL
URIC ACID, SERUM: 12 MG/DL (ref 3.4–7)
WBC # BLD: 8.2 E9/L (ref 4.5–11.5)

## 2022-05-08 PROCEDURE — 2140000000 HC CCU INTERMEDIATE R&B

## 2022-05-08 PROCEDURE — 84550 ASSAY OF BLOOD/URIC ACID: CPT

## 2022-05-08 PROCEDURE — 6370000000 HC RX 637 (ALT 250 FOR IP): Performed by: SURGERY

## 2022-05-08 PROCEDURE — 6360000002 HC RX W HCPCS: Performed by: STUDENT IN AN ORGANIZED HEALTH CARE EDUCATION/TRAINING PROGRAM

## 2022-05-08 PROCEDURE — 73130 X-RAY EXAM OF HAND: CPT

## 2022-05-08 PROCEDURE — 83880 ASSAY OF NATRIURETIC PEPTIDE: CPT

## 2022-05-08 PROCEDURE — 73110 X-RAY EXAM OF WRIST: CPT

## 2022-05-08 PROCEDURE — 80048 BASIC METABOLIC PNL TOTAL CA: CPT

## 2022-05-08 PROCEDURE — 6370000000 HC RX 637 (ALT 250 FOR IP): Performed by: INTERNAL MEDICINE

## 2022-05-08 PROCEDURE — 2580000003 HC RX 258: Performed by: SURGERY

## 2022-05-08 PROCEDURE — 85025 COMPLETE CBC W/AUTO DIFF WBC: CPT

## 2022-05-08 PROCEDURE — 99233 SBSQ HOSP IP/OBS HIGH 50: CPT | Performed by: INTERNAL MEDICINE

## 2022-05-08 PROCEDURE — 2700000000 HC OXYGEN THERAPY PER DAY

## 2022-05-08 PROCEDURE — 36415 COLL VENOUS BLD VENIPUNCTURE: CPT

## 2022-05-08 PROCEDURE — 6370000000 HC RX 637 (ALT 250 FOR IP): Performed by: STUDENT IN AN ORGANIZED HEALTH CARE EDUCATION/TRAINING PROGRAM

## 2022-05-08 PROCEDURE — 6360000002 HC RX W HCPCS: Performed by: INTERNAL MEDICINE

## 2022-05-08 PROCEDURE — 94660 CPAP INITIATION&MGMT: CPT

## 2022-05-08 RX ORDER — DIGOXIN 0.25 MG/ML
250 INJECTION INTRAMUSCULAR; INTRAVENOUS ONCE
Status: COMPLETED | OUTPATIENT
Start: 2022-05-08 | End: 2022-05-08

## 2022-05-08 RX ORDER — METHYLPREDNISOLONE SODIUM SUCCINATE 40 MG/ML
40 INJECTION, POWDER, LYOPHILIZED, FOR SOLUTION INTRAMUSCULAR; INTRAVENOUS DAILY
Status: DISCONTINUED | OUTPATIENT
Start: 2022-05-08 | End: 2022-05-08

## 2022-05-08 RX ORDER — METHYLPREDNISOLONE SODIUM SUCCINATE 40 MG/ML
40 INJECTION, POWDER, LYOPHILIZED, FOR SOLUTION INTRAMUSCULAR; INTRAVENOUS ONCE
Status: COMPLETED | OUTPATIENT
Start: 2022-05-08 | End: 2022-05-08

## 2022-05-08 RX ORDER — PREDNISONE 20 MG/1
40 TABLET ORAL DAILY
Status: DISCONTINUED | OUTPATIENT
Start: 2022-05-09 | End: 2022-05-12 | Stop reason: HOSPADM

## 2022-05-08 RX ORDER — DIGOXIN 125 MCG
125 TABLET ORAL DAILY
Status: DISCONTINUED | OUTPATIENT
Start: 2022-05-08 | End: 2022-05-08

## 2022-05-08 RX ORDER — DIGOXIN 125 MCG
125 TABLET ORAL DAILY
Status: DISCONTINUED | OUTPATIENT
Start: 2022-05-08 | End: 2022-05-12 | Stop reason: HOSPADM

## 2022-05-08 RX ADMIN — SUCRALFATE 1 G: 1 TABLET ORAL at 12:44

## 2022-05-08 RX ADMIN — LEVOTHYROXINE SODIUM 50 MCG: 0.2 TABLET ORAL at 06:00

## 2022-05-08 RX ADMIN — DIGOXIN 125 MCG: 125 TABLET ORAL at 17:47

## 2022-05-08 RX ADMIN — FLUTICASONE PROPIONATE 2 SPRAY: 50 SPRAY, METERED NASAL at 09:13

## 2022-05-08 RX ADMIN — Medication 10 ML: at 15:33

## 2022-05-08 RX ADMIN — SUCRALFATE 1 G: 1 TABLET ORAL at 17:45

## 2022-05-08 RX ADMIN — SUCRALFATE 1 G: 1 TABLET ORAL at 23:46

## 2022-05-08 RX ADMIN — SUCRALFATE 1 G: 1 TABLET ORAL at 06:00

## 2022-05-08 RX ADMIN — PANTOPRAZOLE SODIUM 40 MG: 40 TABLET, DELAYED RELEASE ORAL at 06:00

## 2022-05-08 RX ADMIN — METHYLPREDNISOLONE SODIUM SUCCINATE 40 MG: 40 INJECTION, POWDER, FOR SOLUTION INTRAMUSCULAR; INTRAVENOUS at 15:31

## 2022-05-08 RX ADMIN — Medication 10 ML: at 20:49

## 2022-05-08 RX ADMIN — LEVOTHYROXINE SODIUM 200 MCG: 0.2 TABLET ORAL at 06:01

## 2022-05-08 RX ADMIN — BUMETANIDE 2 MG: 1 TABLET ORAL at 09:10

## 2022-05-08 RX ADMIN — Medication 1000 UNITS: at 09:10

## 2022-05-08 RX ADMIN — POLYETHYLENE GLYCOL 3350 17 G: 17 POWDER, FOR SOLUTION ORAL at 15:31

## 2022-05-08 RX ADMIN — PANTOPRAZOLE SODIUM 40 MG: 40 TABLET, DELAYED RELEASE ORAL at 15:32

## 2022-05-08 RX ADMIN — DIGOXIN 250 MCG: 0.25 INJECTION INTRAMUSCULAR; INTRAVENOUS at 12:44

## 2022-05-08 RX ADMIN — METOPROLOL SUCCINATE 75 MG: 50 TABLET, EXTENDED RELEASE ORAL at 20:49

## 2022-05-08 RX ADMIN — SUCRALFATE 1 G: 1 TABLET ORAL at 02:17

## 2022-05-08 RX ADMIN — METOPROLOL SUCCINATE 75 MG: 50 TABLET, EXTENDED RELEASE ORAL at 09:09

## 2022-05-08 RX ADMIN — BUMETANIDE 2 MG: 1 TABLET ORAL at 20:49

## 2022-05-08 NOTE — PROGRESS NOTES
Hospitalist Progress Note      PCP: Marie Riojas DO    Date of Admission: 5/2/2022    Hospital Course:     78y.o. year old male Hx Abdominal aortic aneurysm (AAA) >39 mm diameter (HCC), Anemia, Cardiomyopathy, Carpal tunnel syndrome, CHF (congestive heart failure) , COPD, GERD, Hx of colonic polyps, Hypertension, Hypothyroidism, Rectal bleeding, Respiratory neoplasm, Sleep apnea, and Tobacco use presents with increased shortness of breath that he is experienced over the course of the last month. Patient lives alone and independently. Patient admits to chronic oxygen use and states he follows a pulmonologist and cardiologist at the [place]. It is chronic nature, worsened by activity, relieved only minimally by rest.  Patient also presents with bilateral 3+ pitting edema to lower extremities and scrotum, that he says has gotten worse over the last month. Patient also states that he has been experiencing dark tarry stools. Patient also presents with A. fib, and denies any history of having it, but takes Xarelto. In ER labs pertinent for elevated BUN of 24 and creatinine of 1.6 it seems to be patient's baseline. Elevated proBNP at 2562, elevated troponin, initially 69 elevated to 72. CBC shows a hemoglobin of 7.3, hemoglobin on a previous admission 8/13/2021 shows a hemoglobin of 14. 3. ABG showed pH of 7.361, CO2 73.1 bicarb 40.5, compensated respiratory acidosis. Patient was placed on BiPAP. A chest x-ray was completed and shows that the heart was enlarged. Pulmonary vessels are congested, mild opacity at the lung bases. Suspect early CHF. \"    Subjective:   He had no oxygen on ,lips were cyanotic. RN placed oxygen. His oxygen levels improved.       Medications:  Reviewed    Infusion Medications    sodium chloride       Scheduled Medications    digoxin  125 mcg Oral Daily    [START ON 5/9/2022] predniSONE  40 mg Oral Daily    bumetanide  2 mg Oral BID    metoprolol succinate  75 mg Oral BID    pantoprazole 40 mg Oral BID AC    sucralfate  1 g Oral 4 times per day    polyethylene glycol  17 g Oral Daily    Budeson-Glycopyrrol-Formoterol  2 puff Inhalation BID    levothyroxine  200 mcg Oral Daily    [Held by provider] rivaroxaban  15 mg Oral Dinner    sodium chloride  1 spray Nasal Daily    Vitamin D  1,000 Units Oral Daily    sodium chloride flush  5-40 mL IntraVENous 2 times per day    fluticasone  2 spray Nasal Daily    levothyroxine  50 mcg Oral Daily     PRN Meds: albuterol, sodium chloride flush, sodium chloride, ondansetron **OR** ondansetron, magnesium hydroxide, acetaminophen **OR** acetaminophen      Intake/Output Summary (Last 24 hours) at 5/8/2022 1335  Last data filed at 5/8/2022 1220  Gross per 24 hour   Intake 240 ml   Output 1800 ml   Net -1560 ml       Physical Exam Performed:    /82   Pulse 108   Temp 98.6 °F (37 °C) (Oral)   Resp 20   Wt 283 lb (128.4 kg)   SpO2 100%   BMI 38.38 kg/m²     General appearance: No apparent distress, appears stated age and cooperative. Obese, nasal 02  HEENT: Pupils equal, round, and reactive to light. Conjunctivae/corneas clear. Neck: Supple, with full range of motion. No jugular venous distention. Trachea midline. Respiratory:  Normal respiratory effort. Clear to auscultation, bilaterally without Rales/Wheezes/Rhonchi. Cardiovascular: Regular rate and rhythm with normal S1/S2 without murmurs, rubs or gallops. Abdomen: Soft, non-tender, non-distended with normal bowel sounds. Musculoskeletal: No clubbing, cyanosis. +chronic venous stasis. +chronic edema. R elbow with edema and limited ROM  Skin: Skin color, texture, turgor normal.  No rashes or lesions.   Neurologic: grossly non-focal.  Psychiatric: Alert and oriented, thought content appropriate, normal insight      Labs:   Recent Labs     05/06/22  0457 05/07/22  0556 05/08/22  0521   WBC 10.0 11.0 8.2   HGB 7.4* 7.6* 7.8*   HCT 30.1* 30.8* 31.7*    283 262     Recent Labs 05/06/22  0457 05/07/22  0556 05/08/22  0521    138 143   K 4.0 4.9 4.6   CL 88* 90* 93*   CO2 41* 40* 40*   BUN 27* 36* 43*   CREATININE 1.8* 2.1* 2.1*   CALCIUM 8.4* 8.7 8.7     No results for input(s): AST, ALT, BILIDIR, BILITOT, ALKPHOS in the last 72 hours. No results for input(s): INR in the last 72 hours. No results for input(s): [place] Fav[place] in the last 72 hours. Urinalysis:      Lab Results   Component Value Date    NITRU Negative 05/02/2022    WBCUA NONE 05/02/2022    BACTERIA NONE SEEN 05/02/2022    RBCUA 0-1 05/02/2022    BLOODU TRACE-INTACT 05/02/2022    SPECGRAV 1.010 05/02/2022    GLUCOSEU Negative 05/02/2022       Radiology:  XR WRIST RIGHT (MIN 3 VIEWS)   Final Result   No fracture or dislocation. Osteo-degenerative changes, as described above. Rule out CPPD of the 2nd metacarpophalangeal joint. Rule out erosive changes   involving the medial head of the 2nd metacarpal bone. Soft tissue swelling   about the affected joints. XR HAND RIGHT (MIN 3 VIEWS)   Final Result   No fracture or dislocation. Osteo-degenerative changes, as described above. Rule out CPPD involving the 2nd metacarpophalangeal joint. Rule out erosive   changes involving the medial head of the 2nd metacarpal bone and the lateral   aspects of the DIP joint of the 3rd finger. Soft tissue swelling around the   affected joints. US DUP UPPER EXTREMITY RIGHT VENOUS   Final Result   Within the visualized vessels there is no evidence for deep venous   thrombosis               XR RADIUS ULNA RIGHT (2 VIEWS)   Final Result   No acute fracture or malalignment. Degenerative changes. XR ELBOW RIGHT (2 VIEWS)   Final Result   Soft tissue edema dorsal to the elbow joint adjacent to the proximal forearm. Elevation of the anterior and posterior fat pads consistent with effusion. XR CHEST PORTABLE   Final Result   Suspect early CHF.                  Assessment/Plan:    C/Viv Bowen 1103 Problems    Diagnosis     Hypothyroidism [E03.9]      Priority: High    Decompensated heart failure (HCC) [I50.9]      Priority: Medium    Microcytic anemia [D50.9]      Priority: Medium    CKD (chronic kidney disease) stage 3, GFR 30-59 ml/min ([facility]) [N18.30]      Priority: Medium    Hypertension [I10]      Priority: Low    ZOE (obstructive sleep apnea) [G47.33]      Priority: Low    COPD (chronic obstructive pulmonary disease) ([facility]) [J44.9]      Priority: Low    Chronic respiratory failure with hypoxia and hypercapnia ([facility]) [J96.11, J96.12]     Paroxysmal atrial fibrillation ([facility]) [I48.0]     Anticoagulated [Z79.01]     Gastrointestinal hemorrhage [K92.2]      Decompensated heart failure on EF 55% on echocardiogram on 6/19/2020  -Consulted cardiology. Changed from IV diuretics to oral diuretics. He has alkalosis. -Echocardiogram 5/3 shows EF 60-65%,severe LVH,dilated RV with reduced EF.   -Daily weights  -Strict intake and output    Acute gastrointestinal bleed with associated blood loss anemia   -Hgb 7.3 on presentation. Transfused PRBC on 5/3/22. Xarelto on hold. -Iron deficient. Iron supplementon discharge.  -Hgb is stable. -Consulted general surgery. EGD 5/4 showed superficial antral ulcers. Continue PPI. Ok for anticoagulation. FU office. Acute on chronic hypoxic respiratory failure-due to CHF  -Currently on 4L NC. Pt is on 4L NC at home. Atrial fibrillation  -Rate controlled  -Xarelto was held for anemia. Restart when ok with Cardiology    RUE pain after trauma  -Xrays of elbow and forearm Soft tissue edema dorsal to the elbow joint adjacent to the proximal forearm and Elevation of the anterior and posterior fat pads consistent with effusion. Consult Ortho. ?Bursitis. ESR/CRP elevated. No DVT in RUE.WBAT. Brandt pillow ordered. Ancef ordered. Started on steriods. ID consult for cellulitis. No concern for cellulitis. Stopped cefazolin. -Right hand Xray shows No fracture or dislocation. Rule out CPPD of the 2nd metacarpophalangeal joint. Check RF.  -Pain control    JUDITH on CKD stage 3(baseline Cr 1.7-1.8)-due to diuresis  -Cr stable. Diuretics changed to oral.    COPD  -Continue inhalers    Hypertension  -BP borderline, will monitor    Hypothyroidism  -Continue Synthroid    History of sleep apnea  -BiPAP    Deconditioning   -PT/OT      DVT Prophylaxis: Xarelto on hold. SCDs. Diet: ADULT DIET; Regular;  Low Sodium (2 gm)  Code Status: Full Code    PT/OT Eval Status: ordered    Dispo - RICHELLE in 2-3days pending improvement of RUE swelling    Kamron Guajardo MD

## 2022-05-08 NOTE — PROGRESS NOTES
Infectious Disease  Progress Note  [facility]    Chief Complaint: cellulitis right UE    Subjective:  He has more pain at the MTP joints today. Pain over right elbow is better. No fever over night. Scheduled Meds:   digoxin  250 mcg IntraVENous Once    digoxin  125 mcg Oral Daily    bumetanide  2 mg Oral BID    metoprolol succinate  75 mg Oral BID    pantoprazole  40 mg Oral BID AC    sucralfate  1 g Oral 4 times per day    polyethylene glycol  17 g Oral Daily    Budeson-Glycopyrrol-Formoterol  2 puff Inhalation BID    levothyroxine  200 mcg Oral Daily    [Held by provider] rivaroxaban  15 mg Oral Dinner    sodium chloride  1 spray Nasal Daily    Vitamin D  1,000 Units Oral Daily    sodium chloride flush  5-40 mL IntraVENous 2 times per day    fluticasone  2 spray Nasal Daily    levothyroxine  50 mcg Oral Daily     Continuous Infusions:   sodium chloride       PRN Meds:albuterol, sodium chloride flush, sodium chloride, ondansetron **OR** ondansetron, magnesium hydroxide, acetaminophen **OR** acetaminophen    ROS:  As mentioned in subjective, all other systems negative      /82   Pulse 108   Temp 98.6 °F (37 °C) (Oral)   Resp 20   Wt 283 lb (128.4 kg)   SpO2 100%   BMI 38.38 kg/m²     Physical Exam  Constitutional: The patient is awake, alert, and oriented. Skin: Warm and dry. No jaundice. very small decub wound with no sign of infection. HEENT: Eyes show round, and reactive pupils. Moist mucous membranes, no ulcerations, no thrush. Neck: Supple to movements. No lymphadenopathy. Chest: clear to auscultation anteriorly. Cardiovascular: S1 and S2 are rhythmic and regular. No murmurs appreciated. Abdomen: soft non tender. Genitourinary: severe penile and testicular swelling. Extremities: No clubbing, no cyanosis, edema bilateral LE  Musculoskeletal: right UE: 3+ edema with minimal redness at the dependent portion.    Neurological: alert, oriented x3  Lines: peripheral    Labs, Cultures reviewed  Radiology reviewed  X-ray right hand:  Impression   No fracture or dislocation.  Osteo-degenerative changes, as described above. Rule out CPPD of the 2nd metacarpophalangeal joint.  Rule out erosive changes   involving the medial head of the 2nd metacarpal bone.  Soft tissue swelling   about the affected joints. Assessment:  · Right elbow and MTP joints swelling likely gout related: no sign of cellulitis. Uric acid is 12     Plan:    · Started on steroids solumedrol 40mg IV one dose, followed by PO prednisone 40mg daily. · Will follow with you. Discussed with orthopedics.      Electronically signed by Lupillo Leon MD on 5/8/2022 at 12:20 PM

## 2022-05-08 NOTE — PROGRESS NOTES
Department of Orthopedic Surgery  Resident Progress Note      SUBJECTIVE    Patient seen and examined this morning. No significant changes in his pain level. He has pain to the right wrist and hand which is new for him. No chest pain or shortness of breath. Nausea overnight but no vomiting. No fevers or chills. No acute events. OBJECTIVE    Physical    VITALS:  /73   Pulse 98   Temp 97.7 °F (36.5 °C) (Oral)   Resp 20   Wt 283 lb (128.4 kg)   SpO2 100%   BMI 38.38 kg/m²   MUSCULOSKELETAL:     Right upper Extremity:  · Erythema, swelling, and tenderness to palpation over the second MCP joint, second through fourth PIP joints   · Stiffness with hand motion, unable to make a composite fist secondary to stiffness at MCP joint and PIP joints   · Significant limitations to motion to the elbow with swelling and edema present  · Tenderness to palpation and stiffness at the wrist, pain at the dorsal and volar aspect  · Compartments soft and compressible  · +AIN/PIN/Ulnar nerve function intact grossly  · +Radial pulse, Brisk Cap refill, hand warm and perfused  · Sensation intact to touch in radial/ulnar/median nerve distributions to hand    Data    CBC:   Lab Results   Component Value Date    WBC 8.2 05/08/2022    RBC 4.14 05/08/2022    HGB 7.8 05/08/2022    HCT 31.7 05/08/2022    MCV 76.6 05/08/2022    MCH 18.8 05/08/2022    MCHC 24.6 05/08/2022    RDW 26.2 05/08/2022     05/08/2022    MPV 10.0 05/08/2022     PT/INR:    Lab Results   Component Value Date    PROTIME 17.1 05/02/2022    INR 1.5 05/02/2022         ASSESSMENT  · Exam today is concerning for inflammatory arthritis, less likely thought to be septic arthritis  · Patient has history of gout, not currently on any prophylaxis    PLAN    · Weight bearing as tolerated RLE  · Uric acid pending  · X-ray imaging of the right hand and wrist pending  · Ancef ordered. · CRP: 14.8; ESR 46.  WBC 10.  · Hgb 7.8, asymptomatic  · Elevation of the extremity and Brandt pillow  · Deep venous thrombosis prophylaxis - per primary, early mobilization  · PT/OT as tolerated  · Pain Control: IV and PO  · We will follow-up imaging and uric acid levels, further recommendations pending results

## 2022-05-08 NOTE — PROGRESS NOTES
INPATIENT CARDIOLOGY FOLLOW-UP    Name: Kaylah Sandoval    Age: 78 y.o. Date of Admission: 5/2/2022 10:30 AM    Date of Service: 5/8/2022    Chief Complaint: Follow-up for acute superimposed upon chronic diastolic heart failure current acute superimposed upon chronic hypoxic respiratory failure, permanent atrial fibrillation, anemia with associated gastritis, acute kidney injury superimposed upon chronic kidney disease, chronic obstructive lung disease, moderate obesity, obstructive sleep apnea    Interim History: The patient's main focus continues to be that of his noncardiovascular issues including ambulatory limitations with no significant cardiovascular complaints in spite of persistent volume overload. Renal function and electrolytes presently remains stable with persistent suboptimal rate control of his atrial arrhythmias. Review of Systems: The remainder of a complete multisystem review including consitutional, central nervous, respiratory, circulatory, gastrointestinal, genitourinary, endocrinologic, hematologic, musculoskeletal and psychiatric are negative. Problem List:  Patient Active Problem List   Diagnosis    Hypertension    ZOE (obstructive sleep apnea)    Cardiomyopathy ([address]    COPD (chronic obstructive pulmonary disease) (HCC)    Hypothyroidism    Chronic respiratory failure with hypoxia and hypercapnia (HCC)    Congestive heart failure (HCC)    Paroxysmal atrial fibrillation (HCC)    Anticoagulated    Gastrointestinal hemorrhage    Atrial fibrillation, chronic (HCC)    Pericardial effusion    JUDITH (acute kidney injury) ([address]    Acute on chronic diastolic heart failure (HCC)    Acute on chronic respiratory failure (HCC)    Decompensated heart failure (HCC)    Microcytic anemia    CKD (chronic kidney disease) stage 3, GFR 30-59 ml/min (HCC)       Allergies:   Allergies   Allergen Reactions    Amlodipine Swelling    Isosorbide Mononitrate [Isosorbide Nitrate] Other (See Comments)     Migraine headaches    Lisinopril Rash     cough       Current Medications:  Current Facility-Administered Medications   Medication Dose Route Frequency Provider Last Rate Last Admin    bumetanide (BUMEX) tablet 2 mg  2 mg Oral BID Bubba Larsen MD   2 mg at 05/07/22 2006    metoprolol succinate (TOPROL XL) extended release tablet 75 mg  75 mg Oral BID Bubba Larsen MD        pantoprazole (PROTONIX) tablet 40 mg  40 mg Oral BID AC Manpreet Dale MD   40 mg at 05/08/22 0600    sucralfate (CARAFATE) tablet 1 g  1 g Oral 4 times per day Izzy Bobby MD   1 g at 05/08/22 0600    polyethylene glycol (GLYCOLAX) packet 17 g  17 g Oral Daily Godfrey Chew MD   17 g at 05/07/22 0903    albuterol (PROVENTIL) nebulizer solution 2.5 mg  2.5 mg Nebulization Q6H PRN Izzy Bobby MD        Budeson-Glycopyrrol-Formoterol 160-9-4.8 MCG/ACT AERO 2 puff  2 puff Inhalation BID Izzy Bobby MD   2 puff at 05/07/22 2006    levothyroxine (SYNTHROID) tablet 200 mcg  200 mcg Oral Daily Izzy Bobby MD   200 mcg at 05/08/22 0601    [Held by provider] rivaroxaban (XARELTO) tablet 15 mg  15 mg Oral Dinner Manpreet Dale MD        sodium chloride (OCEAN, BABY AYR) 0.65 % nasal spray 1 spray  1 spray Nasal Daily Izzy Bobby MD   1 spray at 05/06/22 7309    Vitamin D (CHOLECALCIFEROL) tablet 1,000 Units  1,000 Units Oral Daily Izzy Bobby MD   1,000 Units at 05/07/22 9481    sodium chloride flush 0.9 % injection 5-40 mL  5-40 mL IntraVENous 2 times per day Izzy Bobby MD   10 mL at 05/07/22 2006    sodium chloride flush 0.9 % injection 10 mL  10 mL IntraVENous PRN Izzy Bobby MD        0.9 % sodium chloride infusion   IntraVENous PRN Izzy Bobby MD        ondansetron (ZOFRAN-ODT) disintegrating tablet 4 mg  4 mg Oral Q8H PRN Izzy Bobby MD        Or    ondansetron [facility]) injection 4 mg  4 mg IntraVENous Q6H PRN Izzy Bobby MD   4 mg at 05/1945    magnesium hydroxide (MILK OF MAGNESIA) 400 MG/5ML suspension 30 mL  30 mL Oral Daily PRN Tristian Poole MD        acetaminophen (TYLENOL) tablet 650 mg  650 mg Oral Q6H PRN Tristian Poole MD   650 mg at 05/05/22 1423    Or    acetaminophen (TYLENOL) suppository 650 mg  650 mg Rectal Q6H PRN Tristian Poole MD        fluticasone (FLONASE) 50 MCG/ACT nasal spray 2 spray  2 spray Nasal Daily Tristian Poole MD   2 spray at 05/06/22 2556    levothyroxine (SYNTHROID) tablet 50 mcg  50 mcg Oral Daily Tristian Poole MD   50 mcg at 05/08/22 0600      sodium chloride         Physical Exam:  /73   Pulse 98   Temp 97.7 °F (36.5 °C) (Oral)   Resp 20   Wt 283 lb (128.4 kg)   SpO2 100%   BMI 38.38 kg/m²   Weight change: -2 lb 9.6 oz (-1.179 kg)  Wt Readings from Last 3 Encounters:   05/08/22 283 lb (128.4 kg)   08/13/21 270 lb (122.5 kg)   06/27/20 242 lb (109.8 kg)     The patient is awake, alert and in no discomfort or distress. He appears chronically ill. No gross musculoskeletal deformity is present. No significant skin or nail changes are present. Gross examination of head, eyes, nose and throat are negative. Jugular venous pressure is normal and no carotid bruits are present. Normal respiratory effort is noted with no accessory muscle usage present. Lung fields are clear to ascultation. Cardiac examination is notable for an irregular rhythm with no palpable thrill. No gallop rhythm or cardiac murmur are identified. A benign abdominal examination is present with no masses or organomegaly. Intact pulses are present throughout all extremities and chronic lymphedema and venous stasis is present. No focal neurologic deficits are present. Intake/Output:    Intake/Output Summary (Last 24 hours) at 5/8/2022 0746  Last data filed at 5/7/2022 1716  Gross per 24 hour   Intake 240 ml   Output 1000 ml   Net -760 ml     No intake/output data recorded.     Laboratory Tests:  Lab Results   Component Value Date CREATININE 2.1 (H) 05/08/2022    BUN 43 (H) 05/08/2022     05/08/2022    K 4.6 05/08/2022    CL 93 (L) 05/08/2022    CO2 40 (H) 05/07/2022     No results for input(s): CKTOTAL, CKMB in the last 72 hours. Invalid input(s): TROPONONI  No results found for: BNP  Lab Results   Component Value Date    WBC 8.2 05/08/2022    RBC 4.14 05/08/2022    HGB 7.8 05/08/2022    HCT 31.7 05/08/2022    MCV 76.6 05/08/2022    MCH 18.8 05/08/2022    MCHC 24.6 05/08/2022    RDW 26.2 05/08/2022     05/08/2022    MPV 10.0 05/08/2022     No results for input(s): ALKPHOS, ALT, AST, PROT, BILITOT, BILIDIR, LABALBU in the last 72 hours. Lab Results   Component Value Date    MG 2.2 05/07/2022     Lab Results   Component Value Date    PROTIME 17.1 05/02/2022    INR 1.5 05/02/2022     Lab Results   Component Value Date    TSH 1.090 06/19/2020     No components found for: CHLPL  Lab Results   Component Value Date    TRIG 49 05/03/2022    TRIG 51 03/05/2016     Lab Results   Component Value Date    HDL 37 05/03/2022    HDL 51 03/05/2016     Lab Results   Component Value Date    LDLCALC 44 05/03/2022    [facility] 55 03/05/2016       Cardiac Tests:  Telemetry findings reviewed: atrial fibrillation, no new tachy/bradyarrhythmias overnight      ASSESSMENT / PLAN: On a clinical basis, the patient's volume status continues to gradually stabilized with persistent evidence of volume overload as well as suboptimal rate control of his atrial fibrillation exacerbating diastolic cardiac performance as well as on long-term basis potentially impairing left ventricular systolic function. Relative hypotension limits further modification of his beta-blocker with plans of the addition of low doses of digitalis with need of careful monitoring in light of his concomitant impaired renal function.   Hemoglobin levels remained stable in the face of his concomitant gastritis with persistent withholding of oral anticoagulation based on creatinine clearance is continued needs of dose modification if rivaroxaban is to be resumed with consideration of conversion to apixaban based on improve bleeding profile. Continued management of additional noncardiovascular issues will be deferred to primary care. As outlined, appropriate compliance with the use of nocturnal CPAP will be essential to stabilization of his diastolic heart failure and atrial arrhythmias with weight reduction and as possible in addition to that of aggressive risk factor modification of blood pressure and serum lipids to reduce risk of future atherosclerotic development      Note: This report was completed utilizing computer voice recognition software. Every effort has been made to ensure accuracy, however; inadvertent computerized transcription errors may be present. Jerzy Pradhan.  Cheri Weinstein, [address] [facility] Cardiology

## 2022-05-09 LAB
ANION GAP SERPL CALCULATED.3IONS-SCNC: 4 MMOL/L (ref 7–16)
ANISOCYTOSIS: ABNORMAL
BASOPHILS ABSOLUTE: 0.01 E9/L (ref 0–0.2)
BASOPHILS RELATIVE PERCENT: 0.1 % (ref 0–2)
BUN BLDV-MCNC: 44 MG/DL (ref 6–23)
CALCIUM SERPL-MCNC: 8.5 MG/DL (ref 8.6–10.2)
CHLORIDE BLD-SCNC: 90 MMOL/L (ref 98–107)
CO2: 48 MMOL/L (ref 22–29)
CREAT SERPL-MCNC: 1.8 MG/DL (ref 0.7–1.2)
D DIMER: 2870 NG/ML DDU
EOSINOPHILS ABSOLUTE: 0 E9/L (ref 0.05–0.5)
EOSINOPHILS RELATIVE PERCENT: 0 % (ref 0–6)
GFR AFRICAN AMERICAN: 45
GFR NON-AFRICAN AMERICAN: 37 ML/MIN/1.73
GLUCOSE BLD-MCNC: 125 MG/DL (ref 74–99)
HCT VFR BLD CALC: 31.5 % (ref 37–54)
HEMOGLOBIN: 7.7 G/DL (ref 12.5–16.5)
HYPOCHROMIA: ABNORMAL
IMMATURE GRANULOCYTES #: 0.05 E9/L
IMMATURE GRANULOCYTES %: 0.7 % (ref 0–5)
LYMPHOCYTES ABSOLUTE: 0.27 E9/L (ref 1.5–4)
LYMPHOCYTES RELATIVE PERCENT: 4 % (ref 20–42)
MCH RBC QN AUTO: 18.7 PG (ref 26–35)
MCHC RBC AUTO-ENTMCNC: 24.4 % (ref 32–34.5)
MCV RBC AUTO: 76.5 FL (ref 80–99.9)
MONOCYTES ABSOLUTE: 0.2 E9/L (ref 0.1–0.95)
MONOCYTES RELATIVE PERCENT: 3 % (ref 2–12)
NEUTROPHILS ABSOLUTE: 6.23 E9/L (ref 1.8–7.3)
NEUTROPHILS RELATIVE PERCENT: 92.2 % (ref 43–80)
OVALOCYTES: ABNORMAL
PDW BLD-RTO: 26.2 FL (ref 11.5–15)
PLATELET # BLD: 282 E9/L (ref 130–450)
PMV BLD AUTO: 10 FL (ref 7–12)
POIKILOCYTES: ABNORMAL
POLYCHROMASIA: ABNORMAL
POTASSIUM SERPL-SCNC: 4.6 MMOL/L (ref 3.5–5)
RBC # BLD: 4.12 E12/L (ref 3.8–5.8)
RHEUMATOID FACTOR: 28 IU/ML (ref 0–13)
SODIUM BLD-SCNC: 142 MMOL/L (ref 132–146)
STOMATOCYTES: ABNORMAL
WBC # BLD: 6.8 E9/L (ref 4.5–11.5)

## 2022-05-09 PROCEDURE — 6370000000 HC RX 637 (ALT 250 FOR IP): Performed by: INTERNAL MEDICINE

## 2022-05-09 PROCEDURE — 85025 COMPLETE CBC W/AUTO DIFF WBC: CPT

## 2022-05-09 PROCEDURE — 2580000003 HC RX 258: Performed by: SURGERY

## 2022-05-09 PROCEDURE — 6370000000 HC RX 637 (ALT 250 FOR IP): Performed by: STUDENT IN AN ORGANIZED HEALTH CARE EDUCATION/TRAINING PROGRAM

## 2022-05-09 PROCEDURE — 2700000000 HC OXYGEN THERAPY PER DAY

## 2022-05-09 PROCEDURE — 6360000002 HC RX W HCPCS: Performed by: INTERNAL MEDICINE

## 2022-05-09 PROCEDURE — 85378 FIBRIN DEGRADE SEMIQUANT: CPT

## 2022-05-09 PROCEDURE — 80048 BASIC METABOLIC PNL TOTAL CA: CPT

## 2022-05-09 PROCEDURE — 6370000000 HC RX 637 (ALT 250 FOR IP): Performed by: SURGERY

## 2022-05-09 PROCEDURE — 36415 COLL VENOUS BLD VENIPUNCTURE: CPT

## 2022-05-09 PROCEDURE — 2140000000 HC CCU INTERMEDIATE R&B

## 2022-05-09 PROCEDURE — 86431 RHEUMATOID FACTOR QUANT: CPT

## 2022-05-09 PROCEDURE — 94660 CPAP INITIATION&MGMT: CPT

## 2022-05-09 RX ORDER — ENOXAPARIN SODIUM 150 MG/ML
1 INJECTION SUBCUTANEOUS 2 TIMES DAILY
Status: DISCONTINUED | OUTPATIENT
Start: 2022-05-09 | End: 2022-05-12 | Stop reason: HOSPADM

## 2022-05-09 RX ORDER — ENOXAPARIN SODIUM 150 MG/ML
1 INJECTION SUBCUTANEOUS 2 TIMES DAILY
Status: DISCONTINUED | OUTPATIENT
Start: 2022-05-09 | End: 2022-05-09

## 2022-05-09 RX ADMIN — SALINE NASAL SPRAY 1 SPRAY: 1.5 SOLUTION NASAL at 12:58

## 2022-05-09 RX ADMIN — SUCRALFATE 1 G: 1 TABLET ORAL at 12:57

## 2022-05-09 RX ADMIN — PREDNISONE 40 MG: 20 TABLET ORAL at 09:51

## 2022-05-09 RX ADMIN — FLUTICASONE PROPIONATE 2 SPRAY: 50 SPRAY, METERED NASAL at 09:56

## 2022-05-09 RX ADMIN — LEVOTHYROXINE SODIUM 50 MCG: 0.2 TABLET ORAL at 06:04

## 2022-05-09 RX ADMIN — METOPROLOL SUCCINATE 75 MG: 50 TABLET, EXTENDED RELEASE ORAL at 21:37

## 2022-05-09 RX ADMIN — POLYETHYLENE GLYCOL 3350 17 G: 17 POWDER, FOR SOLUTION ORAL at 09:51

## 2022-05-09 RX ADMIN — Medication 10 ML: at 21:38

## 2022-05-09 RX ADMIN — Medication 1000 UNITS: at 09:52

## 2022-05-09 RX ADMIN — SUCRALFATE 1 G: 1 TABLET ORAL at 18:00

## 2022-05-09 RX ADMIN — PANTOPRAZOLE SODIUM 40 MG: 40 TABLET, DELAYED RELEASE ORAL at 06:03

## 2022-05-09 RX ADMIN — DIGOXIN 125 MCG: 125 TABLET ORAL at 09:52

## 2022-05-09 RX ADMIN — BUMETANIDE 2 MG: 1 TABLET ORAL at 09:52

## 2022-05-09 RX ADMIN — ENOXAPARIN SODIUM 135 MG: 150 INJECTION SUBCUTANEOUS at 21:38

## 2022-05-09 RX ADMIN — SUCRALFATE 1 G: 1 TABLET ORAL at 06:03

## 2022-05-09 RX ADMIN — BUMETANIDE 2 MG: 1 TABLET ORAL at 21:37

## 2022-05-09 RX ADMIN — LEVOTHYROXINE SODIUM 200 MCG: 0.2 TABLET ORAL at 06:03

## 2022-05-09 RX ADMIN — PANTOPRAZOLE SODIUM 40 MG: 40 TABLET, DELAYED RELEASE ORAL at 18:00

## 2022-05-09 NOTE — PROGRESS NOTES
Department of Orthopedic Surgery  Resident Progress Note      SUBJECTIVE    Patient seen and examined this morning. Patient reports improvement with pain. Hand seen in marshall pillow however, not elevated. Ice is applied. Complains of index finger pain. No chest pain or shortness of breath. Nausea overnight but no vomiting. No fevers or chills. No acute events. OBJECTIVE    Physical    VITALS:  BP 94/64   Pulse 90   Temp 97.5 °F (36.4 °C) (Oral)   Resp 18   Wt 289 lb 4.8 oz (131.2 kg)   SpO2 98%   BMI 39.24 kg/m²   MUSCULOSKELETAL:     Right upper Extremity:  · Erythema, swelling, and  over the second MCP joint, second through fourth PIP joints. Mild swelling at the hand and forearm. · Severe pain with palpation at second MCP and DIP joints. · Stiffness with hand motion, however, improvement with making a fist.   · Limited index MCP and DIP motion secondary to pain  · Mild TTP at olecranon. Much improved compared to prior examinations. · Elbow arm . · Compartments soft and compressible  · +AIN/PIN/Ulnar nerve function intact grossly  · +Radial pulse, Brisk Cap refill, hand warm and perfused  · Sensation intact to touch in radial/ulnar/median nerve distributions to hand    Data    CBC:   Lab Results   Component Value Date    WBC 8.2 05/08/2022    RBC 4.14 05/08/2022    HGB 7.8 05/08/2022    HCT 31.7 05/08/2022    MCV 76.6 05/08/2022    MCH 18.8 05/08/2022    MCHC 24.6 05/08/2022    RDW 26.2 05/08/2022     05/08/2022    MPV 10.0 05/08/2022     PT/INR:    Lab Results   Component Value Date    PROTIME 17.1 05/02/2022    INR 1.5 05/02/2022         ASSESSMENT  · Exam today is concerning for inflammatory arthritis, less likely thought to be septic arthritis  · Patient has history of gout, not currently on any prophylaxis    PLAN    · Weight bearing as tolerated RLE  · Uric acid: 12  · X-ray imaging of the right hand and wrist pending  · Ancef ordered. · CRP: 14.8; ESR 46.  WBC 10.  · Hgb 7.7, asymptomatic  · Elevation of the extremity and Brandt pillow with ice application. · Deep venous thrombosis prophylaxis - per primary, early mobilization  · PT/OT as tolerated  · Pain Control: IV and PO  · Patient initially presents with pain in the elbow propagating to the wrist and hand. There have be interval improvement between exams, and improvement with pain with PO steroids. Uric levels were ordered and results displayed an elevation. High suspicion of gouty arthritis. Recommend prophylaxis and treatment for gouty arthritis at this time. We continue to evaluation pain and monitor progression.

## 2022-05-09 NOTE — PROGRESS NOTES
RN notified Zarina Kingdom NP Cardiology that patient BP low and was notified by telemetry monitors that patient had 7 beat run of [address]. Ordered to hold metoprolol at this time. Hatchet Flap Text: The defect edges were debeveled with a #15 scalpel blade.  Given the location of the defect, shape of the defect and the proximity to free margins a hatchet flap was deemed most appropriate.  Using a sterile surgical marker, an appropriate hatchet flap was drawn incorporating the defect and placing the expected incisions within the relaxed skin tension lines where possible.    The area thus outlined was incised deep to adipose tissue with a #15 scalpel blade.  The skin margins were undermined to an appropriate distance in all directions utilizing iris scissors.

## 2022-05-09 NOTE — PROGRESS NOTES
Hospitalist Progress Note      PCP: Ale Johnson DO    Date of Admission: 5/2/2022    Hospital Course:     78y.o. year old male Hx Abdominal aortic aneurysm (AAA) >39 mm diameter (HCC), Anemia, Cardiomyopathy, Carpal tunnel syndrome, CHF (congestive heart failure) , COPD, GERD, Hx of colonic polyps, Hypertension, Hypothyroidism, Rectal bleeding, Respiratory neoplasm, Sleep apnea, and Tobacco use presents with increased shortness of breath that he is experienced over the course of the last month. Patient lives alone and independently. Patient admits to chronic oxygen use and states he follows a pulmonologist and cardiologist at the [address] [place] [address]. It is chronic nature, worsened by activity, relieved only minimally by rest.  Patient also presents with bilateral 3+ pitting edema to lower extremities and scrotum, that he says has gotten worse over the last month. Patient also states that he has been experiencing dark tarry stools. Patient also presents with A. fib, and denies any history of having it, but takes Xarelto. In ER labs pertinent for elevated BUN of 24 and creatinine of 1.6 it seems to be patient's baseline. Elevated proBNP at 2562, elevated troponin, initially 69 elevated to 72. CBC shows a hemoglobin of 7.3, hemoglobin on a previous admission 8/13/2021 shows a hemoglobin of 14. 3. ABG showed pH of 7.361, CO2 73.1 bicarb 40.5, compensated respiratory acidosis. Patient was placed on BiPAP. A chest x-ray was completed and shows that the heart was enlarged. Pulmonary vessels are congested, mild opacity at the lung bases. Suspect early CHF. \"    Subjective:   He has less pain in right arm and hand. Dyspnea somewhat breathing.       Medications:  Reviewed    Infusion Medications    sodium chloride       Scheduled Medications    digoxin  125 mcg Oral Daily    predniSONE  40 mg Oral Daily    bumetanide  2 mg Oral BID    metoprolol succinate  75 mg Oral BID    pantoprazole  40 mg Oral BID AC    sucralfate  1 g Oral 4 times per day    polyethylene glycol  17 g Oral Daily    Budeson-Glycopyrrol-Formoterol  2 puff Inhalation BID    levothyroxine  200 mcg Oral Daily    [Held by provider] rivaroxaban  15 mg Oral Dinner    sodium chloride  1 spray Nasal Daily    Vitamin D  1,000 Units Oral Daily    sodium chloride flush  5-40 mL IntraVENous 2 times per day    fluticasone  2 spray Nasal Daily    levothyroxine  50 mcg Oral Daily     PRN Meds: albuterol, sodium chloride flush, sodium chloride, ondansetron **OR** ondansetron, magnesium hydroxide, acetaminophen **OR** acetaminophen      Intake/Output Summary (Last 24 hours) at 5/9/2022 1211  Last data filed at 5/9/2022 0609  Gross per 24 hour   Intake 180 ml   Output 3600 ml   Net -3420 ml       Physical Exam Performed:    BP (!) 88/59   Pulse 80   Temp 97.5 °F (36.4 °C) (Oral)   Resp 18   Ht 6' (1.829 m)   Wt 282 lb 3.2 oz (128 kg)   SpO2 100%   BMI 38.27 kg/m²     General appearance: No apparent distress, appears stated age and cooperative. Obese, nasal 02  HEENT: Pupils equal, round, and reactive to light. Conjunctivae/corneas clear. Neck: Supple, with full range of motion. No jugular venous distention. Trachea midline. Respiratory:  Normal respiratory effort. Clear to auscultation, bilaterally without Rales/Wheezes/Rhonchi. Cardiovascular: Regular rate and rhythm with normal S1/S2 without murmurs, rubs or gallops. Abdomen: Soft, non-tender, non-distended with normal bowel sounds. Musculoskeletal: No clubbing, cyanosis. +chronic venous stasis. +chronic edema. R elbow with edema and limited ROM  Skin: Skin color, texture, turgor normal.  No rashes or lesions.   Neurologic: grossly non-focal.  Psychiatric: Alert and oriented, thought content appropriate, normal insight      Labs:   Recent Labs     05/07/22  0556 05/08/22  0521 05/09/22  0522   WBC 11.0 8.2 6.8   HGB 7.6* 7.8* 7.7*   HCT 30.8* 31.7* 31.5*    262 282     Recent Labs     05/07/22  0556 05/08/22  0521 05/09/22 0522    143 142   K 4.9 4.6 4.6   CL 90* 93* 90*   CO2 40* 40* 48*   BUN 36* 43* 44*   CREATININE 2.1* 2.1* 1.8*   CALCIUM 8.7 8.7 8.5*     No results for input(s): AST, ALT, BILIDIR, BILITOT, ALKPHOS in the last 72 hours. No results for input(s): INR in the last 72 hours. No results for input(s): Shellia Bugler in the last 72 hours. Urinalysis:      Lab Results   Component Value Date    NITRU Negative 05/02/2022    WBCUA NONE 05/02/2022    BACTERIA NONE SEEN 05/02/2022    RBCUA 0-1 05/02/2022    BLOODU TRACE-INTACT 05/02/2022    SPECGRAV 1.010 05/02/2022    GLUCOSEU Negative 05/02/2022       Radiology:  XR WRIST RIGHT (MIN 3 VIEWS)   Final Result   No fracture or dislocation. Osteo-degenerative changes, as described above. Rule out CPPD of the 2nd metacarpophalangeal joint. Rule out erosive changes   involving the medial head of the 2nd metacarpal bone. Soft tissue swelling   about the affected joints. XR HAND RIGHT (MIN 3 VIEWS)   Final Result   No fracture or dislocation. Osteo-degenerative changes, as described above. Rule out CPPD involving the 2nd metacarpophalangeal joint. Rule out erosive   changes involving the medial head of the 2nd metacarpal bone and the lateral   aspects of the DIP joint of the 3rd finger. Soft tissue swelling around the   affected joints. US DUP UPPER EXTREMITY RIGHT VENOUS   Final Result   Within the visualized vessels there is no evidence for deep venous   thrombosis               XR RADIUS ULNA RIGHT (2 VIEWS)   Final Result   No acute fracture or malalignment. Degenerative changes. XR ELBOW RIGHT (2 VIEWS)   Final Result   Soft tissue edema dorsal to the elbow joint adjacent to the proximal forearm. Elevation of the anterior and posterior fat pads consistent with effusion. XR CHEST PORTABLE   Final Result   Suspect early CHF.                  Assessment/Plan:    Active Hospital Problems Diagnosis     Hypothyroidism [E03.9]      Priority: High    Decompensated heart failure ([facility]) [I50.9]      Priority: Medium    Microcytic anemia [D50.9]      Priority: Medium    CKD (chronic kidney disease) stage 3, GFR 30-59 ml/min ([facility]) [N18.30]      Priority: Medium    Hypertension [I10]      Priority: Low    ZOE (obstructive sleep apnea) [G47.33]      Priority: Low    COPD (chronic obstructive pulmonary disease) ([facility]) [J44.9]      Priority: Low    Chronic respiratory failure with hypoxia and hypercapnia ([facility]) [J96.11, J96.12]     Paroxysmal atrial fibrillation ([facility]) [I48.0]     Anticoagulated [Z79.01]     Gastrointestinal hemorrhage [K92.2]      Decompensated heart failure on EF 55% on echocardiogram on 6/19/2020  -Consulted cardiology. Changed from IV diuretics to oral diuretics. He has alkalosis. -Echocardiogram 5/3 shows EF 60-65%,severe LVH,dilated RV with reduced EF.   -Daily weights  -Strict intake and output    Acute gastrointestinal bleed with associated blood loss anemia   -Hgb 7.3 on presentation. Transfused PRBC on 5/3/22. Xarelto on hold. -Iron deficient. Iron supplementon discharge.  -Hgb is stable. -Consulted general surgery. EGD 5/4 showed superficial antral ulcers. Continue PPI. Ok for anticoagulation. FU office. Acute on chronic hypoxic respiratory failure-due to CHF  -Increased 02 requirements to  6L NC. Pt is on 4L NC at home.  -Check D-dimer. Addendum: D-dimer elevated, will check VQ scan. Start empiric therapeutic Lovenox.  -Worsening serum bicarb. Likely due to hypoxia. Will check VBG. Atrial fibrillation  -Rate controlled  -Xarelto was held for anemia. Restart when ok with Cardiology    RUE pain after trauma  -Xrays of elbow and forearm Soft tissue edema dorsal to the elbow joint adjacent to the proximal forearm and Elevation of the anterior and posterior fat pads consistent with effusion. Consult Ortho. ?Bursitis. ESR/CRP elevated. No DVT in RUE.WBAT. Brandt pillow ordered. Ancef ordered. .ID consult for cellulitis. No concern for cellulitis. Stopped cefazolin. Started on steriods. Improvement in pain. -Right hand Xray shows No fracture or dislocation. Rule out CPPD of the 2nd metacarpophalangeal joint. Elevated RF,check anti-CCP ab. If elevated pt will need Rheumatology eval outpatient. .  -Pain control    JUDITH on CKD stage 3(baseline Cr 1.7-1.8)-due to diuresis  -Cr trended down to baseline. Diuretics changed to oral.    COPD  -Continue inhalers    Hypertension  -BP borderline, will monitor    Hypothyroidism  -Continue Synthroid    History of sleep apnea  -BiPAP    Deconditioning   -PT/OT      DVT Prophylaxis: Xarelto on hold. SCDs. Diet: ADULT DIET; Regular;  Low Sodium (2 gm)  Code Status: Full Code    PT/OT Eval Status: ordered    Dispo - RICHELLE in 2-3days pending improvement of RUE swelling    Kamron Guajardo MD

## 2022-05-09 NOTE — PROGRESS NOTES
RN provided patient's son with printouts of CHF diet plans and tips for avoiding salt, using other seasonings and what to focus on buying at the grocery store. Encouraged patient and family to follow up at CHF clinic, weight daily and what signs to look for fluid overload. Family receptive to teaching, patient will need continuing education and encouragement to make lifestyle changes.

## 2022-05-09 NOTE — PROGRESS NOTES
Infectious Disease  Progress Note  [facility]    Chief Complaint: cellulitis right UE    Subjective:  Right UE pain is better. No fevers. Swelling over right UE is a lot better, no new complaints. Scheduled Meds:   digoxin  125 mcg Oral Daily    predniSONE  40 mg Oral Daily    bumetanide  2 mg Oral BID    metoprolol succinate  75 mg Oral BID    pantoprazole  40 mg Oral BID AC    sucralfate  1 g Oral 4 times per day    polyethylene glycol  17 g Oral Daily    Budeson-Glycopyrrol-Formoterol  2 puff Inhalation BID    levothyroxine  200 mcg Oral Daily    [Held by provider] rivaroxaban  15 mg Oral Dinner    sodium chloride  1 spray Nasal Daily    Vitamin D  1,000 Units Oral Daily    sodium chloride flush  5-40 mL IntraVENous 2 times per day    fluticasone  2 spray Nasal Daily    levothyroxine  50 mcg Oral Daily     Continuous Infusions:   sodium chloride       PRN Meds:albuterol, sodium chloride flush, sodium chloride, ondansetron **OR** ondansetron, magnesium hydroxide, acetaminophen **OR** acetaminophen    ROS:  As mentioned in subjective, all other systems negative      BP (!) 88/59   Pulse 80   Temp 97.5 °F (36.4 °C) (Oral)   Resp 18   Ht 6' (1.829 m)   Wt 282 lb 3.2 oz (128 kg)   SpO2 100%   BMI 38.27 kg/m²     Physical Exam  Constitutional: The patient is awake, alert, and oriented. Skin: Warm and dry. No jaundice. very small decub wound with no sign of infection. HEENT: Eyes show round, and reactive pupils. Moist mucous membranes, no ulcerations, no thrush. Neck: Supple to movements. No lymphadenopathy. Chest: clear to auscultation anteriorly. Cardiovascular: S1 and S2 are rhythmic and regular. No murmurs appreciated. Abdomen: soft non tender. Genitourinary: severe penile and testicular swelling. Extremities: No clubbing, no cyanosis, edema bilateral LE  Musculoskeletal: right UE: 3+ edema with minimal redness at the dependent portion.    Neurological: alert, oriented x3  Lines: peripheral    Labs, Cultures reviewed  Radiology reviewed  X-ray right hand:  Impression   No fracture or dislocation.  Osteo-degenerative changes, as described above. Rule out CPPD of the 2nd metacarpophalangeal joint.  Rule out erosive changes   involving the medial head of the 2nd metacarpal bone.  Soft tissue swelling   about the affected joints. Assessment:  · Right elbow and MTP joints swelling likely gout related: no sign of cellulitis. Uric acid is 12. RFelevated. · After one dose of IV steroids there is significant clinical improvement.      Plan:    · I recommend to continue prednisone 40mg  for 7 days. (orders in place). · Follow up with rheumatology/PCP outpatient for future prevention. ID signs off. Please call me if needed.      Electronically signed by Juan F Cisneros MD on 5/9/2022 at 1:03 PM

## 2022-05-09 NOTE — PROGRESS NOTES
Comprehensive Nutrition Assessment    Type and Reason for Visit:  Initial (LOS)    Nutrition Recommendations/Plan:   1. No nutritional supplementation recommended at this time. Malnutrition Assessment:  Malnutrition Status:  No malnutrition (05/09/22 1054)    Context:  Acute Illness     Findings of the 6 clinical characteristics of malnutrition:  Energy Intake:  No significant decrease in energy intake  Weight Loss:  Unable to assess (d/t lack of weight history)     Body Fat Loss:  No significant body fat loss     Muscle Mass Loss:  No significant muscle mass loss    Fluid Accumulation:  No significant fluid accumulation     Strength:  Not Performed    Nutrition Assessment:    Patient averaging % of meals served since admission ; adm w/ SOB and chronic respiratory failure with hypoxia/hypercapnia ; adm w/ acute GI bleed (EGD on 5/4 showed antral ulcers) ; JUDITH on CKD ; hx of COPD/CAD/CHF ; no ONS recommended at this time    Nutrition Related Findings:    -I&Os (-10.6 L), 3-4+ edema, A&O x 4, constipation, obesity Wound Type: None       Current Nutrition Intake & Therapies:    Average Meal Intake: %     ADULT DIET; Regular; Low Sodium (2 gm)    Anthropometric Measures:  Height: 6' (182.9 cm)  Ideal Body Weight (IBW): 178 lbs (81 kg)    Admission Body Weight: 285 lb (129.3 kg) (5/3, bedscale)  Current Body Weight: 282 lb (127.9 kg) (5/9, bedscale), 158.4 % IBW.  Weight Source: Bed Scale  Current BMI (kg/m2): 38.2  Usual Body Weight:  (UTO ; EMR shows only one weight recorded of 270# stated on 8/13/21)                       BMI Categories: Obese Class 2 (BMI 35.0 -39.9)    Estimated Daily Nutrient Needs:  Energy Requirements Based On: Formula  Weight Used for Energy Requirements: Current  Energy (kcal/day): 0944-2746 (REE 2055 x 1.1 SF)  Weight Used for Protein Requirements: Ideal  Protein (g/day): 105-125 (1.3-1.5g/kg IBW)  Method Used for Fluid Requirements: 1 ml/kcal  Fluid (ml/day): 1727-4738    Nutrition Diagnosis:   No nutrition diagnosis at this time     Nutrition Interventions:   Food and/or Nutrient Delivery: Continue Current Diet  Nutrition Education/Counseling: Education not indicated  Coordination of Nutrition Care: Continue to monitor while inpatient       Goals:  Previous Goal Met: Progressing toward Goal(s)  Goals: PO intake 75% or greater,by next RD assessment       Nutrition Monitoring and Evaluation:   Behavioral-Environmental Outcomes: None Identified  Food/Nutrient Intake Outcomes: Food and Nutrient Intake  Physical Signs/Symptoms Outcomes: Biochemical Data,Chewing or Swallowing,GI Status,Fluid Status or Edema,Meal Time Behavior,Hemodynamic Status,Nutrition Focused Physical Findings,Weight,Skin,Constipation    Discharge Planning:     Too soon to determine     Kraen Flood RD, LD  Contact: [phone]

## 2022-05-10 ENCOUNTER — APPOINTMENT (OUTPATIENT)
Dept: NUCLEAR MEDICINE | Age: 77
DRG: 291 | End: 2022-05-10
Payer: OTHER GOVERNMENT

## 2022-05-10 LAB
ANION GAP SERPL CALCULATED.3IONS-SCNC: 8 MMOL/L (ref 7–16)
ANISOCYTOSIS: ABNORMAL
BASOPHILS ABSOLUTE: 0 E9/L (ref 0–0.2)
BASOPHILS RELATIVE PERCENT: 0.1 % (ref 0–2)
BUN BLDV-MCNC: 46 MG/DL (ref 6–23)
CALCIUM SERPL-MCNC: 8.8 MG/DL (ref 8.6–10.2)
CHLORIDE BLD-SCNC: 89 MMOL/L (ref 98–107)
CO2: 45 MMOL/L (ref 22–29)
CREAT SERPL-MCNC: 1.6 MG/DL (ref 0.7–1.2)
EOSINOPHILS ABSOLUTE: 0 E9/L (ref 0.05–0.5)
EOSINOPHILS RELATIVE PERCENT: 0 % (ref 0–6)
GFR AFRICAN AMERICAN: 51
GFR NON-AFRICAN AMERICAN: 42 ML/MIN/1.73
GLUCOSE BLD-MCNC: 103 MG/DL (ref 74–99)
HCT VFR BLD CALC: 31.4 % (ref 37–54)
HEMOGLOBIN: 7.8 G/DL (ref 12.5–16.5)
HYPOCHROMIA: ABNORMAL
LYMPHOCYTES ABSOLUTE: 0.39 E9/L (ref 1.5–4)
LYMPHOCYTES RELATIVE PERCENT: 4.3 % (ref 20–42)
MCH RBC QN AUTO: 18.8 PG (ref 26–35)
MCHC RBC AUTO-ENTMCNC: 24.8 % (ref 32–34.5)
MCV RBC AUTO: 75.8 FL (ref 80–99.9)
MONOCYTES ABSOLUTE: 0.58 E9/L (ref 0.1–0.95)
MONOCYTES RELATIVE PERCENT: 6.1 % (ref 2–12)
NEUTROPHILS ABSOLUTE: 8.73 E9/L (ref 1.8–7.3)
NEUTROPHILS RELATIVE PERCENT: 89.6 % (ref 43–80)
OVALOCYTES: ABNORMAL
PDW BLD-RTO: 25.9 FL (ref 11.5–15)
PLATELET # BLD: 302 E9/L (ref 130–450)
PMV BLD AUTO: 10 FL (ref 7–12)
POIKILOCYTES: ABNORMAL
POLYCHROMASIA: ABNORMAL
POTASSIUM SERPL-SCNC: 4.4 MMOL/L (ref 3.5–5)
RBC # BLD: 4.14 E12/L (ref 3.8–5.8)
SODIUM BLD-SCNC: 142 MMOL/L (ref 132–146)
WBC # BLD: 9.7 E9/L (ref 4.5–11.5)

## 2022-05-10 PROCEDURE — 6370000000 HC RX 637 (ALT 250 FOR IP): Performed by: INTERNAL MEDICINE

## 2022-05-10 PROCEDURE — 6370000000 HC RX 637 (ALT 250 FOR IP): Performed by: SURGERY

## 2022-05-10 PROCEDURE — 80048 BASIC METABOLIC PNL TOTAL CA: CPT

## 2022-05-10 PROCEDURE — 78582 LUNG VENTILAT&PERFUS IMAGING: CPT | Performed by: RADIOLOGY

## 2022-05-10 PROCEDURE — 97530 THERAPEUTIC ACTIVITIES: CPT

## 2022-05-10 PROCEDURE — 2580000003 HC RX 258: Performed by: SURGERY

## 2022-05-10 PROCEDURE — 6370000000 HC RX 637 (ALT 250 FOR IP): Performed by: STUDENT IN AN ORGANIZED HEALTH CARE EDUCATION/TRAINING PROGRAM

## 2022-05-10 PROCEDURE — A9539 TC99M PENTETATE: HCPCS | Performed by: RADIOLOGY

## 2022-05-10 PROCEDURE — 3430000000 HC RX DIAGNOSTIC RADIOPHARMACEUTICAL: Performed by: RADIOLOGY

## 2022-05-10 PROCEDURE — A9540 TC99M MAA: HCPCS | Performed by: RADIOLOGY

## 2022-05-10 PROCEDURE — 78582 LUNG VENTILAT&PERFUS IMAGING: CPT

## 2022-05-10 PROCEDURE — 97161 PT EVAL LOW COMPLEX 20 MIN: CPT

## 2022-05-10 PROCEDURE — 2140000000 HC CCU INTERMEDIATE R&B

## 2022-05-10 PROCEDURE — 6360000002 HC RX W HCPCS: Performed by: INTERNAL MEDICINE

## 2022-05-10 PROCEDURE — 2700000000 HC OXYGEN THERAPY PER DAY

## 2022-05-10 PROCEDURE — 36415 COLL VENOUS BLD VENIPUNCTURE: CPT

## 2022-05-10 PROCEDURE — 94660 CPAP INITIATION&MGMT: CPT

## 2022-05-10 PROCEDURE — 85025 COMPLETE CBC W/AUTO DIFF WBC: CPT

## 2022-05-10 PROCEDURE — 97535 SELF CARE MNGMENT TRAINING: CPT

## 2022-05-10 PROCEDURE — 86200 CCP ANTIBODY: CPT

## 2022-05-10 RX ORDER — KIT FOR THE PREPARATION OF TECHNETIUM TC 99M PENTETATE 20 MG/1
35 INJECTION, POWDER, LYOPHILIZED, FOR SOLUTION INTRAVENOUS; RESPIRATORY (INHALATION)
Status: COMPLETED | OUTPATIENT
Start: 2022-05-10 | End: 2022-05-10

## 2022-05-10 RX ORDER — SENNA AND DOCUSATE SODIUM 50; 8.6 MG/1; MG/1
1 TABLET, FILM COATED ORAL 2 TIMES DAILY
Status: DISCONTINUED | OUTPATIENT
Start: 2022-05-10 | End: 2022-05-12 | Stop reason: HOSPADM

## 2022-05-10 RX ADMIN — ENOXAPARIN SODIUM 135 MG: 150 INJECTION SUBCUTANEOUS at 20:24

## 2022-05-10 RX ADMIN — SALINE NASAL SPRAY 1 SPRAY: 1.5 SOLUTION NASAL at 09:47

## 2022-05-10 RX ADMIN — Medication 6 MILLICURIE: at 10:35

## 2022-05-10 RX ADMIN — PANTOPRAZOLE SODIUM 40 MG: 40 TABLET, DELAYED RELEASE ORAL at 05:58

## 2022-05-10 RX ADMIN — Medication 1000 UNITS: at 09:47

## 2022-05-10 RX ADMIN — DIGOXIN 125 MCG: 125 TABLET ORAL at 09:47

## 2022-05-10 RX ADMIN — ENOXAPARIN SODIUM 135 MG: 150 INJECTION SUBCUTANEOUS at 09:47

## 2022-05-10 RX ADMIN — SUCRALFATE 1 G: 1 TABLET ORAL at 18:30

## 2022-05-10 RX ADMIN — METOPROLOL SUCCINATE 75 MG: 50 TABLET, EXTENDED RELEASE ORAL at 09:47

## 2022-05-10 RX ADMIN — SENNOSIDES AND DOCUSATE SODIUM 1 TABLET: 50; 8.6 TABLET ORAL at 20:24

## 2022-05-10 RX ADMIN — SENNOSIDES AND DOCUSATE SODIUM 1 TABLET: 50; 8.6 TABLET ORAL at 18:30

## 2022-05-10 RX ADMIN — KIT FOR THE PREPARATION OF TECHNETIUM TC 99M PENTETATE 35 MILLICURIE: 20 INJECTION, POWDER, LYOPHILIZED, FOR SOLUTION INTRAVENOUS; RESPIRATORY (INHALATION) at 10:31

## 2022-05-10 RX ADMIN — BUMETANIDE 2 MG: 1 TABLET ORAL at 09:47

## 2022-05-10 RX ADMIN — PANTOPRAZOLE SODIUM 40 MG: 40 TABLET, DELAYED RELEASE ORAL at 18:30

## 2022-05-10 RX ADMIN — Medication 10 ML: at 20:25

## 2022-05-10 RX ADMIN — Medication 10 ML: at 09:47

## 2022-05-10 RX ADMIN — LEVOTHYROXINE SODIUM 50 MCG: 0.2 TABLET ORAL at 05:58

## 2022-05-10 RX ADMIN — FLUTICASONE PROPIONATE 2 SPRAY: 50 SPRAY, METERED NASAL at 09:47

## 2022-05-10 RX ADMIN — LEVOTHYROXINE SODIUM 200 MCG: 0.2 TABLET ORAL at 09:47

## 2022-05-10 RX ADMIN — METOPROLOL SUCCINATE 75 MG: 50 TABLET, EXTENDED RELEASE ORAL at 20:25

## 2022-05-10 RX ADMIN — PREDNISONE 40 MG: 20 TABLET ORAL at 09:47

## 2022-05-10 RX ADMIN — BUMETANIDE 2 MG: 1 TABLET ORAL at 20:25

## 2022-05-10 RX ADMIN — POLYETHYLENE GLYCOL 3350 17 G: 17 POWDER, FOR SOLUTION ORAL at 09:48

## 2022-05-10 RX ADMIN — SUCRALFATE 1 G: 1 TABLET ORAL at 12:58

## 2022-05-10 RX ADMIN — SUCRALFATE 1 G: 1 TABLET ORAL at 05:59

## 2022-05-10 NOTE — PROGRESS NOTES
Department of Orthopedic Surgery  Resident Progress Note      SUBJECTIVE    Patient seen and examined this morning. His pain is continue to improve. He also notes improved motion about the elbow and the hand. No chest pain or shortness of breath. No additional concerns overnight. OBJECTIVE    Physical    VITALS:  /64   Pulse 70   Temp 98.4 °F (36.9 °C) (Oral)   Resp 17   Ht 6' (1.829 m)   Wt 295 lb 4.8 oz (133.9 kg)   SpO2 98%   BMI 40.05 kg/m²     MUSCULOSKELETAL:     Right upper Extremity:  · Erythema, swelling, and  over the second MCP joint, second through fourth PIP joints which is improved from previous exam  · Moderate pain with palpation at second MCP joint as well as PIP joints of the second third and fourth digits. · Improved motion with flexion of all digits, patient still is unable to make a composite fist  · Mild TTP at olecranon. Much improved compared to prior examinations. · Elbow arm  without significant discomfort  · Compartments soft and compressible  · +AIN/PIN/Ulnar nerve function intact grossly  · +Radial pulse, Brisk Cap refill, hand warm and perfused  · Sensation intact to touch in radial/ulnar/median nerve distributions to hand    Data    CBC:   Lab Results   Component Value Date    WBC 9.7 05/10/2022    RBC 4.14 05/10/2022    HGB 7.8 05/10/2022    HCT 31.4 05/10/2022    MCV 75.8 05/10/2022    MCH 18.8 05/10/2022    MCHC 24.8 05/10/2022    RDW 25.9 05/10/2022     05/10/2022    MPV 10.0 05/10/2022     PT/INR:    Lab Results   Component Value Date    PROTIME 17.1 05/02/2022    INR 1.5 05/02/2022         ASSESSMENT  · Exam today is concerning for inflammatory arthritis, less likely thought to be septic arthritis  · Patient has history of gout, not currently on any prophylaxis    PLAN    · Weight bearing as tolerated RLE  · Uric acid: 12  · CRP: 14.8; ESR 46.  WBC 10.  · Hgb 7.8, asymptomatic  · Elevation of the extremity and Brandt pillow   · Deep venous thrombosis prophylaxis - per primary, early mobilization  · PT/OT as tolerated  · Pain Control: IV and PO  · Clinical exam is continuing to improve. Continue medical management of suspected inflammatory arthritis involving the right upper extremity. Orthopedic surgery will sign off at this time. Please contact the orthopedic service should clinical picture deteriorate or any other concerns arise.

## 2022-05-10 NOTE — PROGRESS NOTES
Physical Therapy  Physical Therapy Initial Assessment     Name: Robert Sewell  : 1945  MRN: [id number]      Date of Service: 5/10/2022    Evaluating PT:  Prem Odom PT, DPT [id number]     Room #:  8111/6558-O  Diagnosis:  Acute congestive heart failure, unspecified heart failure type ([facility] [address] [I50.9]  Decompensated heart failure ([facility] [address] [I50.9]  Reason for admission: SOB   Precautions:  Falls, O2, RUE pain/weakness  Procedure/Surgery:   EGD biopsy  Equipment Recommendations:  Bariatric FWW    SUBJECTIVE:  Pt lives alone in a 1 story home with 4 JAD 1 rail. Basement shower with 7 steps 1 rail down. Pt ambulated with no AD PTA. Does own a [address].     OBJECTIVE:   Initial Evaluation  Date:  Treatment  5/10 Short Term/ Long Term   Goals   AM-PAC 6 Clicks     Was pt agreeable to Eval/treatment? Yes  Yes     Does pt have pain? RUE with movement or touch. Located at elbow  No report    Bed Mobility  Rolling: NT  Supine to sit: ModA  Sit to supine: MaxA  Scooting: MaxA Rolling: NT  Supine to sit: Miko  Sit to supine: Miko  Scooting: Miko SBA   Transfers Sit to stand: ModA<>MaxA  Stand to sit: ModA  Stand pivot: ModA no AD Sit to stand: ModA  Stand to sit: Miko  Stand pivot: NT SBA   Ambulation    side steps no AD ModA  &  3 ft to bedside commode no AD ModA 20 ft with [address] Miko   >100 ft with [address] Mod I    Stair negotiation: ascended and descended  NT  TBD     LE ROM: WFL    LE Strength:   knee ext:   ankle DF:   -functional weakness     Balance:   Sitting static: Independent  Sitting dynamic: Supervision   Standing static: SBA  Standing dynamic: Miko [address]    -Pt is A & O x 3  -Sensation:  Pt denies numbness and tingling to extremities  -Edema:  Global increase, worst at R elbow and BLEs.      Therapeutic Exercises:  Functional activity     Patient education  Pt educated on safety, sequencing of transfers, and role of PT    Patient response to education:   Pt verbalized understanding Pt demonstrated skill Pt requires further education in this area   Yes  With assist Yes      ASSESSMENT:  Conditions Requiring Skilled Therapeutic Intervention:  []Decreased strength     []Decreased ROM  [x]Decreased functional mobility  [x]Decreased balance   [x]Decreased endurance   []Decreased posture  []Decreased sensation  []Decreased coordination   []Decreased vision  [x]Decreased safety awareness   [x]Increased pain       Comments:  Pt received supine in bed and agreeable to PT session   Pt reports continued fatigue and weakness but is much improved from previous session. He is now only requiring light assistance to transfer himself to bedside when the [facility] is elevated. Still needing lift assist to come to stand and walker for ambulation. He shows poor walker safety and walker approximation likely d/t pt never having assistive device training previous to this admission. Was able to increase his ambulation distance with only minor SOB. Returned to bed to end session    Pt would benefit from continued PT POC to address functional deficits described above. Treatment:  Patient practiced and was instructed in the following treatment:     Therapeutic activity  o Patient education provided continuously throughout session for sequencing, safety maintenance, and improving any deficits found during the evaluation. o Bed mobility training - pt given verbal and tactile cues to facilitate proper sequencing and safety during rolling and supine<>sit as well as provided with physical assistance to complete task   o Sitting EOB for >5 minutes for upright tolerance, postural awareness and BLE ROM    o STS and pivot transfer training - pt educated on proper hand and foot placement, safety and sequencing, and use of proper technique to safely complete sit<>stand and pivot transfers with hands on assistance to complete task safely.    o Gait training- pt was given verbal and tactile cues to facilitate improved balance and endurance during ambulation as well as provided with physical assistance. PHYSICAL THERAPY PLAN OF CARE:    PT POC is established based on physician order and patient diagnosis     Referring provider/PT Order:  05/04/22 1300   PT eval and treat Start: 05/04/22 1300, End: 05/04/22 1300, ONE TIME, Standing Count: 1 Occurrences, Canary Felty, MD    Diagnosis:  Acute congestive heart failure, unspecified heart failure type ([address] [I50.9]  Decompensated heart failure ([address] [I50.9]  Specific instructions for next treatment:  Progress transfers and ambulation as able. Current Treatment Recommendations:   [] Strengthening to improve independence with functional mobility   [] ROM to improve independence with functional mobility   [x] Balance Training to improve static/dynamic balance and to reduce fall risk  [x] Endurance Training to improve activity tolerance during functional mobility   [x] Transfer Training to improve safety and independence with all functional transfers   [x] Gait Training to improve gait mechanics, endurance and asses need for appropriate assistive device  [] Stair Training in preparation for safe discharge home and/or into the community   [x] Positioning to prevent skin breakdown and contractures  [x] Safety and Education Training   [] Patient/Caregiver Education   [] HEP  [] Other     PT long term treatment goals are located in above grid    Frequency of treatments: 2-5x/week x 1-2 weeks. Time in  1440  Time out  1455    Total Treatment Time  15 minutes     Evaluation Time includes thorough review of current medical information, gathering information on past medical history/social history and prior level of function, completion of standardized testing/informal observation of tasks, assessment of data and education on plan of care and goals.     CPT codes:  [] Low Complexity PT evaluation 96068  [] Moderate Complexity PT evaluation 08719  [] High Complexity PT evaluation 39085  [] PT Re-evaluation 40840  [] Gait training 54136 - minutes  [] Manual therapy [id number] - minutes  [x] Therapeutic activities 98492 15 minutes  [] Therapeutic exercises 62698 - minutes  [] Neuromuscular reeducation 47123 - minutes     Moni Maurer PT, DPT  [id number]

## 2022-05-10 NOTE — PROGRESS NOTES
Hospitalist Progress Note      PCP: Martha Calix DO    Date of Admission: 5/2/2022    Hospital Course:     78y.o. year old male Hx Abdominal aortic aneurysm (AAA) >39 mm diameter (HCC), Anemia, Cardiomyopathy, Carpal tunnel syndrome, CHF (congestive heart failure) , COPD, GERD, Hx of colonic polyps, Hypertension, Hypothyroidism, Rectal bleeding, Respiratory neoplasm, Sleep apnea, and Tobacco use presents with increased shortness of breath that he is experienced over the course of the last month. Patient lives alone and independently. Patient admits to chronic oxygen use and states he follows a pulmonologist and cardiologist at the [facility]. It is chronic nature, worsened by activity, relieved only minimally by rest.  Patient also presents with bilateral 3+ pitting edema to lower extremities and scrotum, that he says has gotten worse over the last month. Patient also states that he has been experiencing dark tarry stools. Patient also presents with A. fib, and denies any history of having it, but takes Xarelto. In ER labs pertinent for elevated BUN of 24 and creatinine of 1.6 it seems to be patient's baseline. Elevated proBNP at 2562, elevated troponin, initially 69 elevated to 72. CBC shows a hemoglobin of 7.3, hemoglobin on a previous admission 8/13/2021 shows a hemoglobin of 14. 3. ABG showed pH of 7.361, CO2 73.1 bicarb 40.5, compensated respiratory acidosis. Patient was placed on BiPAP. A chest x-ray was completed and shows that the heart was enlarged. Pulmonary vessels are congested, mild opacity at the lung bases. Suspect early CHF. \"    Subjective:   He has now mobility of right arm. No dyspnea.       Medications:  Reviewed    Infusion Medications    sodium chloride       Scheduled Medications    enoxaparin  1 mg/kg SubCUTAneous BID    digoxin  125 mcg Oral Daily    predniSONE  40 mg Oral Daily    bumetanide  2 mg Oral BID    metoprolol succinate  75 mg Oral BID    pantoprazole  40 mg Oral BID AC  sucralfate  1 g Oral 4 times per day    polyethylene glycol  17 g Oral Daily    Budeson-Glycopyrrol-Formoterol  2 puff Inhalation BID    levothyroxine  200 mcg Oral Daily    [Held by provider] rivaroxaban  15 mg Oral Dinner    sodium chloride  1 spray Nasal Daily    Vitamin D  1,000 Units Oral Daily    sodium chloride flush  5-40 mL IntraVENous 2 times per day    fluticasone  2 spray Nasal Daily    levothyroxine  50 mcg Oral Daily     PRN Meds: albuterol, sodium chloride flush, sodium chloride, ondansetron **OR** ondansetron, magnesium hydroxide, acetaminophen **OR** acetaminophen      Intake/Output Summary (Last 24 hours) at 5/10/2022 1123  Last data filed at 5/10/2022 0505  Gross per 24 hour   Intake 640 ml   Output 2850 ml   Net -2210 ml       Physical Exam Performed:    /71   Pulse 77   Temp 97.5 °F (36.4 °C)   Resp 20   Ht 6' (1.829 m)   Wt 295 lb 4.8 oz (133.9 kg)   SpO2 100%   BMI 40.05 kg/m²     General appearance: No apparent distress, appears stated age and cooperative. Obese, nasal 02  HEENT: Pupils equal, round, and reactive to light. Conjunctivae/corneas clear. Neck: Supple, with full range of motion. No jugular venous distention. Trachea midline. Respiratory:  Normal respiratory effort. Diminished at bases  Cardiovascular: Regular rate and rhythm with normal S1/S2 without murmurs, rubs or gallops. Abdomen: Soft, non-tender, non-distended with normal bowel sounds. Musculoskeletal: No clubbing, cyanosis. +chronic venous stasis. +chronic edema. R elbow with reduced edema and increased ROM from before  Skin: Skin color, texture, turgor normal.  No rashes or lesions.   Neurologic: grossly non-focal.  Psychiatric: Alert and oriented, thought content appropriate, normal insight      Labs:   Recent Labs     05/08/22  0521 05/09/22  0522 05/10/22  0517   WBC 8.2 6.8 9.7   HGB 7.8* 7.7* 7.8*   HCT 31.7* 31.5* 31.4*    282 302     Recent Labs     05/08/22 0521 05/09/22 0522 05/10/22  0517    142 142   K 4.6 4.6 4.4   CL 93* 90* 89*   CO2 40* 48* 45*   BUN 43* 44* 46*   CREATININE 2.1* 1.8* 1.6*   CALCIUM 8.7 8.5* 8.8     No results for input(s): AST, ALT, BILIDIR, BILITOT, ALKPHOS in the last 72 hours. No results for input(s): INR in the last 72 hours. No results for input(s): Rudy [place] in the last 72 hours. Urinalysis:      Lab Results   Component Value Date    NITRU Negative 05/02/2022    WBCUA NONE 05/02/2022    BACTERIA NONE SEEN 05/02/2022    RBCUA 0-1 05/02/2022    BLOODU TRACE-INTACT 05/02/2022    SPECGRAV 1.010 05/02/2022    GLUCOSEU Negative 05/02/2022       Radiology:  XR WRIST RIGHT (MIN 3 VIEWS)   Final Result   No fracture or dislocation. Osteo-degenerative changes, as described above. Rule out CPPD of the 2nd metacarpophalangeal joint. Rule out erosive changes   involving the medial head of the 2nd metacarpal bone. Soft tissue swelling   about the affected joints. XR HAND RIGHT (MIN 3 VIEWS)   Final Result   No fracture or dislocation. Osteo-degenerative changes, as described above. Rule out CPPD involving the 2nd metacarpophalangeal joint. Rule out erosive   changes involving the medial head of the 2nd metacarpal bone and the lateral   aspects of the DIP joint of the 3rd finger. Soft tissue swelling around the   affected joints. US DUP UPPER EXTREMITY RIGHT VENOUS   Final Result   Within the visualized vessels there is no evidence for deep venous   thrombosis               XR RADIUS ULNA RIGHT (2 VIEWS)   Final Result   No acute fracture or malalignment. Degenerative changes. XR ELBOW RIGHT (2 VIEWS)   Final Result   Soft tissue edema dorsal to the elbow joint adjacent to the proximal forearm. Elevation of the anterior and posterior fat pads consistent with effusion. XR CHEST PORTABLE   Final Result   Suspect early CHF.          NM LUNG VENT/PERFUSION (VQ)    (Results Pending) Assessment/Plan:    Active Hospital Problems    Diagnosis     Hypothyroidism [E03.9]      Priority: High    Decompensated heart failure ([facility]) [I50.9]      Priority: Medium    Microcytic anemia [D50.9]      Priority: Medium    CKD (chronic kidney disease) stage 3, GFR 30-59 ml/min ([facility]) [N18.30]      Priority: Medium    Hypertension [I10]      Priority: Low    ZOE (obstructive sleep apnea) [G47.33]      Priority: Low    COPD (chronic obstructive pulmonary disease) ([facility]) [J44.9]      Priority: Low    Chronic respiratory failure with hypoxia and hypercapnia ([facility]) [J96.11, J96.12]     Paroxysmal atrial fibrillation ([facility]) [I48.0]     Anticoagulated [Z79.01]     Gastrointestinal hemorrhage [K92.2]      Decompensated heart failure on EF 55% on echocardiogram on 6/19/2020  -Consulted cardiology. Changed from IV diuretics to oral diuretics. He has alkalosis. -Echocardiogram 5/3 shows EF 60-65%,severe LVH,dilated RV with reduced EF.   -Daily weights  -Strict intake and output    Acute gastrointestinal bleed with associated blood loss anemia   -Hgb 7.3 on presentation. Transfused PRBC on 5/3/22. Xarelto on hold. Stable on lovenox. -Iron deficient. Iron supplementon discharge.  -Hgb is stable. -Consulted general surgery. EGD 5/4 showed superficial antral ulcers. Continue PPI. Ok for anticoagulation. FU office. Acute on chronic hypoxic respiratory failure-due to CHF  -Increased 02 requirements to  6L NC yesterday. Back to baseline 4L today. .Pt is on 4L NC at home. - D-dimer elevated, FUcheck VQ scan. Start empiric therapeutic Lovenox while waiting for result. .  -Worsening serum bicarb. Likely due to recemt hypoxia. Improving. Atrial fibrillation  -Rate controlled  -Xarelto was held for anemia. Currently on lovenox due to elevated D-dimer.     RUE pain after trauma  -Xrays of elbow and forearm Soft tissue edema dorsal to the elbow joint adjacent to the proximal forearm and Elevation of the anterior and posterior fat pads consistent with effusion. Consult Ortho. ?Bursitis. ESR/CRP elevated. No DVT in RUE.WBAT. Brandt pillow ordered. Ancef ordered. .ID consult for cellulitis. No concern for cellulitis. Stopped cefazolin. Started on steriods. Improvement in pain. -Right hand Xray shows No fracture or dislocation. Rule out CPPD of the 2nd metacarpophalangeal joint. Elevated RF. FU anti-CCP ab. If elevated pt will need Rheumatology eval outpatient. .  -Pain control    JUDITH on CKD stage 3(baseline Cr 1.7-1.8)-due to diuresis  -Cr trended down to baseline. Diuretics changed to oral.    COPD  -Continue inhalers    Hypertension  -BP borderline, will monitor    Hypothyroidism  -Continue Synthroid    History of sleep apnea  -BiPAP    Deconditioning   -PT/OT    DVT Prophylaxis: Xarelto on hold. SCDs and lovenox  Diet: ADULT DIET; Regular; Low Sodium (2 gm)  Code Status: Full Code    PT/OT Eval Status: ordered    Dispo - RICHELLE tomorrow. Difficult placement due to [place] patient.     Cici Burleson MD

## 2022-05-10 NOTE — CARE COORDINATION
Call made to [facility] at [facility] at the [place] to follow-up regarding the referral. [facility] states they are still waiting for the son to bring in the patient's insurance card and are not able to proceed until they are able to verify benefits. Patient currently out of the room for VQ scan. 11:06A  Contacted Samanta after alternate insurance now listed. She will run benefits and follow-up. Call made to Public Benefits for patient to be assessed; left message.     Anita Chapa, MSW, LSW [phone]

## 2022-05-10 NOTE — PROGRESS NOTES
[address] [id number] [facility]      Date:5/10/2022                                                  Patient Name: Deepthi Prasad  MRN: [id number]  : 1945  Room: [address] [place] [address]    Evaluating OT: SANGEETA Rainey, OTR/L [id number]  Referring Breezy Montero MD  Specific Provider Orders: OT eval and treat   Recommended Adaptive Equipment:  TBD     Diagnosis: CHF   Surgery: none   Pertinent Medical History: COPD, HTN, hypothyroidism, AAA, cardiomyopathy   Precautions:  Fall Risk,  WBAT RUE    Assessment of current deficits   [x] Functional mobility  [x]ADLs  [x] Strength               [x]Cognition   [x] Functional transfers   [x] IADLs         [x] Safety Awareness   [x]Endurance   [] Fine Coordination              [x] Balance      [] Vision/perception   [x]Sensation    []Gross Motor Coordination  [] ROM  [] Delirium                   [] Motor Control     OT PLAN OF CARE   OT POC based on physician orders, patient diagnosis and results of clinical assessment    Frequency/Duration: 2-3 days/wk for 2 weeks PRN   Specific OT Treatment to include:   * Instruction/training on adapted ADL techniques and AE recommendations to increase functional independence within precautions       * Training on energy conservation strategies, correct breathing pattern and techniques to improve independence/tolerance for self-care routine  * Functional transfer/mobility training/DME recommendations for increased independence, safety, and fall prevention  * Patient/Family education to increase follow through with safety techniques and functional independence  * Recommendation of environmental modifications for increased safety with functional transfers/mobility and ADLs  * Therapeutic exercise to improve motor endurance, ROM, and functional strength for ADLs/functional transfers  * Therapeutic activities to facilitate/challenge dynamic balance, stand tolerance for increased safety and independence with ADLs  * Neuro-muscular re-education: facilitation of righting/equilibrium reactions, midline orientation, scapular stability/mobility, normalization of muscle tone, and facilitation of volitional active controled movement    Home Living: Pt lives alone in a 1 story home; bed/bath on main level   Bathroom setup: tub shower unit on main level, walk in shower in bsement   Equipment owned: shower chair  Prior Level of Function: IND with ADLs/IADLs; using rollator for functional mobility     Pain Level: RUE  Cognition: A&O: 3/4; Follows 1 step directions, with repetition and increased time   Memory:  fair    Sequencing:  fair    Problem solving:  fair    Judgement/safety:  fair     Functional Assessment:  AM-PAC Daily Activity Raw Score: 14/24   Initial Eval Status  Date: 5/5/22 Treatment Status  Date: 5/10/22 STGs=LTGs  Time Frame: 10-14 days   Feeding Min A (positioning in bed)  Min A  Pt demo reduced  strength for self-feeding and bilateral/in-hand manipulation tasks. SUP   Grooming SBA (upright in bed)  Min A   to open/close containers to brush teeth and complete oral care. SUP   UB Dressing Max A (due to RUE pain) Mod A SBA   LB Dressing Dep (assist with socks) Max A overall Max A    Bathing Max A (simulated) Max A overall   Mod A    Toileting Max A Max A  Pt reports independent use of urinal while supine in bed. Mod A   Bed Mobility  Log roll: NT  Supine to sit: NT   Sit to supine: NT  Supine to sit: Min A   Sit to supine: Min A  Verbal cues for technique and body mechanics to reduce pain and improve ease of task for energy conservation. Log roll Min A  Supine to sit: Min A   Sit to supine: Min A   Functional Transfers Sit to stand:NT   Stand to sit:NT  Commode: NT Sit to stand: Min A  Stand to sit: Min A  From EOB. Verbal cues for body mechanics and hand placement to maximize safety.   Commode: NT MIN a   Functional Mobility NT Min A with ww  For side steps at EOB. Good sequencing noted. Min A   Balance Sitting: NT  Standing: NT Sitting: S  Standing: Min A    Activity Tolerance fair Fair    Visual/  Perceptual Glasses: no             UE ROM: RUE: WFL  LUE: WFL  Strength: RUE: Deferred d/t pain in RUE. LUE: grossly 4/5   Strength: R poor, L WFL  Fine Motor Coordination:  Fair; limited by edema in R hand and reduced  strength. Hearing: WFL  Sensation:  No c/o numbness/tingling   Tone:  WFL  Edema: BLEs, RUE swelling. Comments:Cleared by RN to see pt. Upon arrival, patient supine in bed and agreeable to OT session. At end of session, patient upright in bed with call light and phone within reach, all lines and tubes intact. Pt would benefit from continued OT to increase functional independence and quality of life. Treatment:     Instruction/training on safety and adapted techniques for completion of ADLs: to increase independence in self-care.   Instruction/training on safe functional mobility/transfer techniques: with focus on safety, body mechanics, and precautions    Proper Positioning/Alignment for optimal healing, skin integrity, to prevent breakdown, decrease edema. Pt demo good understanding of techniques.  Therapeutic activity: to challenge dynamic sitting/standing balance and endurance to promote safety during ADL tasks and functional transfers and mobility.     Time In: 1:48p       Time Out: 2:20p    Total treatment time: 32 min     Treatment Charges: Mins Units   OT Eval Low 03720     OT Eval Medium 72561     OT Eval High 44829     OT Re-Eval 41975     Ther Ex  42270       Manual Therapy 23183       Thera Activities [id number] 15 1    ADL/Home t 32185 17 1   Neuro Re-ed 36535       Group Therapy        Orthotic manage/training  [id number]       Non-Billable Time           Critical access hospital [address] [facility] Ritika OTR/L, [id number]

## 2022-05-11 VITALS
HEART RATE: 78 BPM | DIASTOLIC BLOOD PRESSURE: 78 MMHG | TEMPERATURE: 98.1 F | WEIGHT: 286.2 LBS | HEIGHT: 72 IN | SYSTOLIC BLOOD PRESSURE: 105 MMHG | BODY MASS INDEX: 38.76 KG/M2 | OXYGEN SATURATION: 98 % | RESPIRATION RATE: 18 BRPM

## 2022-05-11 LAB
ANISOCYTOSIS: ABNORMAL
BASOPHILS ABSOLUTE: 0 E9/L (ref 0–0.2)
BASOPHILS RELATIVE PERCENT: 0.1 % (ref 0–2)
EOSINOPHILS ABSOLUTE: 0 E9/L (ref 0.05–0.5)
EOSINOPHILS RELATIVE PERCENT: 0 % (ref 0–6)
HCT VFR BLD CALC: 32.5 % (ref 37–54)
HEMOGLOBIN: 7.9 G/DL (ref 12.5–16.5)
HYPOCHROMIA: ABNORMAL
LYMPHOCYTES ABSOLUTE: 0.19 E9/L (ref 1.5–4)
LYMPHOCYTES RELATIVE PERCENT: 1.7 % (ref 20–42)
MCH RBC QN AUTO: 18.8 PG (ref 26–35)
MCHC RBC AUTO-ENTMCNC: 24.3 % (ref 32–34.5)
MCV RBC AUTO: 77.2 FL (ref 80–99.9)
MONOCYTES ABSOLUTE: 0.94 E9/L (ref 0.1–0.95)
MONOCYTES RELATIVE PERCENT: 9.6 % (ref 2–12)
NEUTROPHILS ABSOLUTE: 8.37 E9/L (ref 1.8–7.3)
NEUTROPHILS RELATIVE PERCENT: 88.7 % (ref 43–80)
OVALOCYTES: ABNORMAL
PDW BLD-RTO: 26.3 FL (ref 11.5–15)
PLATELET # BLD: 288 E9/L (ref 130–450)
PMV BLD AUTO: 10 FL (ref 7–12)
POIKILOCYTES: ABNORMAL
POLYCHROMASIA: ABNORMAL
RBC # BLD: 4.21 E12/L (ref 3.8–5.8)
TARGET CELLS: ABNORMAL
WBC # BLD: 9.4 E9/L (ref 4.5–11.5)

## 2022-05-11 PROCEDURE — 36415 COLL VENOUS BLD VENIPUNCTURE: CPT

## 2022-05-11 PROCEDURE — 6370000000 HC RX 637 (ALT 250 FOR IP): Performed by: SURGERY

## 2022-05-11 PROCEDURE — 6370000000 HC RX 637 (ALT 250 FOR IP): Performed by: INTERNAL MEDICINE

## 2022-05-11 PROCEDURE — 6360000002 HC RX W HCPCS: Performed by: INTERNAL MEDICINE

## 2022-05-11 PROCEDURE — 2700000000 HC OXYGEN THERAPY PER DAY

## 2022-05-11 PROCEDURE — 2580000003 HC RX 258: Performed by: SURGERY

## 2022-05-11 PROCEDURE — 6370000000 HC RX 637 (ALT 250 FOR IP): Performed by: STUDENT IN AN ORGANIZED HEALTH CARE EDUCATION/TRAINING PROGRAM

## 2022-05-11 PROCEDURE — 94660 CPAP INITIATION&MGMT: CPT

## 2022-05-11 PROCEDURE — 85025 COMPLETE CBC W/AUTO DIFF WBC: CPT

## 2022-05-11 RX ORDER — BUMETANIDE 2 MG/1
2 TABLET ORAL 2 TIMES DAILY
Qty: 30 TABLET | Refills: 3 | Status: SHIPPED | OUTPATIENT
Start: 2022-05-11

## 2022-05-11 RX ORDER — PANTOPRAZOLE SODIUM 40 MG/1
40 TABLET, DELAYED RELEASE ORAL
Qty: 30 TABLET | Refills: 3 | Status: SHIPPED | OUTPATIENT
Start: 2022-05-11

## 2022-05-11 RX ORDER — PREDNISONE 20 MG/1
40 TABLET ORAL DAILY
Qty: 6 TABLET | Refills: 0 | Status: SHIPPED | OUTPATIENT
Start: 2022-05-12 | End: 2022-05-15

## 2022-05-11 RX ORDER — DIGOXIN 125 MCG
125 TABLET ORAL DAILY
Qty: 30 TABLET | Refills: 3 | Status: SHIPPED | OUTPATIENT
Start: 2022-05-12

## 2022-05-11 RX ADMIN — Medication 1000 UNITS: at 08:59

## 2022-05-11 RX ADMIN — LEVOTHYROXINE SODIUM 200 MCG: 0.2 TABLET ORAL at 05:44

## 2022-05-11 RX ADMIN — BUMETANIDE 2 MG: 1 TABLET ORAL at 08:58

## 2022-05-11 RX ADMIN — LEVOTHYROXINE SODIUM 50 MCG: 0.2 TABLET ORAL at 05:42

## 2022-05-11 RX ADMIN — METOPROLOL SUCCINATE 75 MG: 50 TABLET, EXTENDED RELEASE ORAL at 08:58

## 2022-05-11 RX ADMIN — PANTOPRAZOLE SODIUM 40 MG: 40 TABLET, DELAYED RELEASE ORAL at 05:42

## 2022-05-11 RX ADMIN — DIGOXIN 125 MCG: 125 TABLET ORAL at 08:59

## 2022-05-11 RX ADMIN — PREDNISONE 40 MG: 20 TABLET ORAL at 08:59

## 2022-05-11 RX ADMIN — SALINE NASAL SPRAY 1 SPRAY: 1.5 SOLUTION NASAL at 09:00

## 2022-05-11 RX ADMIN — PANTOPRAZOLE SODIUM 40 MG: 40 TABLET, DELAYED RELEASE ORAL at 17:17

## 2022-05-11 RX ADMIN — SENNOSIDES AND DOCUSATE SODIUM 1 TABLET: 50; 8.6 TABLET ORAL at 08:59

## 2022-05-11 RX ADMIN — SUCRALFATE 1 G: 1 TABLET ORAL at 11:26

## 2022-05-11 RX ADMIN — FLUTICASONE PROPIONATE 2 SPRAY: 50 SPRAY, METERED NASAL at 09:00

## 2022-05-11 RX ADMIN — SUCRALFATE 1 G: 1 TABLET ORAL at 01:16

## 2022-05-11 RX ADMIN — SUCRALFATE 1 G: 1 TABLET ORAL at 05:43

## 2022-05-11 RX ADMIN — Medication 10 ML: at 08:59

## 2022-05-11 RX ADMIN — POLYETHYLENE GLYCOL 3350 17 G: 17 POWDER, FOR SOLUTION ORAL at 08:58

## 2022-05-11 NOTE — PROGRESS NOTES
All discharge information reviewed with patient and patient's daughter at this time including important follow up visit as well as medication changes, verified that meds were sent to correct pharmacy and instructed patient to pick them up in the morning when they open. Patient states \"I urinated a little bit\" post goode removal instructed patient that if he is not voiding a larger amount by 2300 tonight that he needs to notify his PCP tomorrow. Patient declined any home healthcare needs and he states the [place] takes care of him.

## 2022-05-11 NOTE — PLAN OF CARE
Problem: Discharge Planning  Goal: Discharge to home or other facility with appropriate resources  Outcome: Completed     Problem: Safety - Adult  Goal: Free from fall injury  Outcome: Completed     Problem: ABCDS Injury Assessment  Goal: Absence of physical injury  Outcome: Completed     Problem: Skin/Tissue Integrity  Goal: Absence of new skin breakdown  Description: 1. Monitor for areas of redness and/or skin breakdown  2. Assess vascular access sites hourly  3. Every 4-6 hours minimum:  Change oxygen saturation probe site  4. Every 4-6 hours:  If on nasal continuous positive airway pressure, respiratory therapy assess nares and determine need for appliance change or resting period.   Outcome: Completed     Problem: Pain  Goal: Verbalizes/displays adequate comfort level or baseline comfort level  Outcome: Completed

## 2022-05-11 NOTE — PLAN OF CARE
Problem: Chronic Conditions and Co-morbidities  Goal: Patient's chronic conditions and co-morbidity symptoms are monitored and maintained or improved  Outcome: Completed     Problem: Discharge Planning  Goal: Discharge to home or other facility with appropriate resources  Outcome: Completed     Problem: Safety - Adult  Goal: Free from fall injury  Outcome: Completed     Problem: ABCDS Injury Assessment  Goal: Absence of physical injury  Outcome: Completed     Problem: Skin/Tissue Integrity  Goal: Absence of new skin breakdown  Description: 1. Monitor for areas of redness and/or skin breakdown  2. Assess vascular access sites hourly  3. Every 4-6 hours minimum:  Change oxygen saturation probe site  4. Every 4-6 hours:  If on nasal continuous positive airway pressure, respiratory therapy assess nares and determine need for appliance change or resting period.   Outcome: Completed     Problem: Pain  Goal: Verbalizes/displays adequate comfort level or baseline comfort level  Outcome: Completed

## 2022-05-11 NOTE — DISCHARGE SUMMARY
Hospital Medicine Discharge Summary    Patient ID: Porsha Scott      Patient's PCP: Bharath Barnes DO    Admit Date: 5/2/2022     Discharge Date:   5/11/2022    Admitting Physician: No admitting provider for patient encounter. Discharge Physician: Rafaela Molina MD     Discharge Diagnoses: Active Hospital Problems    Diagnosis Date Noted    Hypothyroidism [E03.9]      Priority: High    Decompensated heart failure ([facility] [address] [I50.9] 05/02/2022     Priority: Medium    Microcytic anemia [D50.9] 05/02/2022     Priority: Medium    CKD (chronic kidney disease) stage 3, GFR 30-59 ml/min ([facility] [address] [N18.30] 05/02/2022     Priority: Medium    Hypertension [I10]      Priority: Low    ZOE (obstructive sleep apnea) [G47.33]      Priority: Low    COPD (chronic obstructive pulmonary disease) ([facility] [address] [J44.9]      Priority: Low    Chronic respiratory failure with hypoxia and hypercapnia (HCC) [J96.11, J96.12] 12/07/2018    Paroxysmal atrial fibrillation ([facility] [address] [I48.0]     Anticoagulated [Z79.01]     Gastrointestinal hemorrhage [K92.2]        The patient was seen and examined on day of discharge and this discharge summary is in conjunction with any daily progress note from day of discharge. Hospital Course:     Patient is a 77 y/o male with a PMH of Abdominal aortic aneurysm (AAA) >39 mm diameter (HCC), Anemia, Cardiomyopathy, Carpal tunnel syndrome, CHF (congestive heart failure) , COPD, GERD, Hx of colonic polyps, Hypertension, Hypothyroidism, Rectal bleeding, Respiratory neoplasm, Sleep apnea, and Tobacco use. He was admitted with a complaint of increased shortness of breath which worsened over a month prior to admission. He uses oxygen at home. HE had worsening bilateral lower extremity edema as well. He was found to have elevated proBNP, and ABG showed compensated respiratory acidosis. CXR showed bilateral pulmonary edema and vascular congestion.  He was admitted and managed for acute exacerbation of heart failure. He was diuresed with IV bumex. 2D echo done shwoed EF of 60-65%, with severe LVH and dilated RV with reduced EF. He was also found to have acute GI bleed with acute blood loss anemia. He was transfused with packed red blood cells. He had EGD by general surgery on 5/4/2022 which showed superficial antral ulcers. He was placed on PPI. Anticoagulation was resumed. His shortness of breath improved and he went down to his baseline 4 L of oxygen. Spinal course was also complicated by JUDITH on CKD stage III which was thought to be due to diuresis and this subsequently resolved. His shortness of breath improved and he felt much better. Was recommended that patient goes to a skilled nursing facility but he refused and opted to go home. He was discharged home on 5/11/2022 and p.o. Bumex and is to follow-up with his primary care doctor and cardiology. Patient was seen and examined prior to discharge. He felt well and had no active complaints. He had an uneventful night and review of systems otherwise negative. Labs and vitals reviewed. Medication reviewed and reconciled. Exam:     BP 92/67   Pulse 78   Temp 97.3 °F (36.3 °C) (Temporal)   Resp 20   Ht 6' (1.829 m)   Wt 286 lb 3.2 oz (129.8 kg)   SpO2 100%   BMI 38.82 kg/m²     General appearance: No apparent distress, appears stated age and cooperative. HEENT: Pupils equal, round, and reactive to light. Conjunctivae/corneas clear. Neck: Supple, with full range of motion. No jugular venous distention. Trachea midline. Respiratory:  Normal respiratory effort. Clear to auscultation, bilaterally without Rales/Wheezes/Rhonchi. On 4L of oxygen by nasal canula, which is his baseline. Cardiovascular: Regular rate and rhythm with normal S1/S2 without murmurs, rubs or gallops. Abdomen: Soft, non-tender, non-distended with normal bowel sounds. Musculoskeletal: No clubbing, cyanosis or edema bilaterally.   Full range of motion without deformity. Skin: Skin color, texture, turgor normal.  No rashes or lesions. Neurologic:  Neurovascularly intact without any focal sensory/motor deficits. Cranial nerves: II-XII intact, grossly non-focal.  Psychiatric: Alert and oriented, thought content appropriate, normal insight      Consults:     IP CONSULT TO INTERNAL MEDICINE  IP CONSULT TO GENERAL SURGERY  IP CONSULT TO DIETITIAN  IP CONSULT TO CARDIOLOGY  IP CONSULT TO HEART FAILURE NURSE/COORDINATOR  IP CONSULT TO ORTHOPEDIC SURGERY  IP CONSULT TO INFECTIOUS DISEASES    Significant Diagnostic Studies:    NM LUNG VENT/PERFUSION (VQ)   Final Result   There appears to be a matched right lower lobe segmental perfusion   defect, when correlated with the chest radiograph there is evidence to   suggest an infiltrate, this would be most consistent with low   probability for pulmonary embolus. Correlation with CT or CTA would be   useful. XR WRIST RIGHT (MIN 3 VIEWS)   Final Result   No fracture or dislocation. Osteo-degenerative changes, as described above. Rule out CPPD of the 2nd metacarpophalangeal joint. Rule out erosive changes   involving the medial head of the 2nd metacarpal bone. Soft tissue swelling   about the affected joints. XR HAND RIGHT (MIN 3 VIEWS)   Final Result   No fracture or dislocation. Osteo-degenerative changes, as described above. Rule out CPPD involving the 2nd metacarpophalangeal joint. Rule out erosive   changes involving the medial head of the 2nd metacarpal bone and the lateral   aspects of the DIP joint of the 3rd finger. Soft tissue swelling around the   affected joints. US DUP UPPER EXTREMITY RIGHT VENOUS   Final Result   Within the visualized vessels there is no evidence for deep venous   thrombosis               XR RADIUS ULNA RIGHT (2 VIEWS)   Final Result   No acute fracture or malalignment. Degenerative changes.          XR ELBOW RIGHT (2 VIEWS)   Final Result   Soft tissue edema dorsal to the elbow joint adjacent to the proximal forearm. Elevation of the anterior and posterior fat pads consistent with effusion. XR CHEST PORTABLE   Final Result   Suspect early CHF. Disposition:  Home with home health     Discharge Instructions/Follow-up:  PCP and cardiology in 1-2 weeks    Code Status:  Full Code     Activity: activity as tolerated    Diet: cardiac diet    Labs:  For convenience and continuity at follow-up the following most recent labs are provided:      CBC:    Lab Results   Component Value Date    WBC 9.4 05/11/2022    HGB 7.9 05/11/2022    HCT 32.5 05/11/2022     05/11/2022       Renal:    Lab Results   Component Value Date     05/10/2022    K 4.4 05/10/2022    K 4.0 05/02/2022    CL 89 05/10/2022    CO2 45 05/10/2022    BUN 46 05/10/2022    CREATININE 1.6 05/10/2022    CALCIUM 8.8 05/10/2022    PHOS 4.5 03/05/2016       Discharge Medications:     Current Discharge Medication List           Details   predniSONE (DELTASONE) 20 MG tablet Take 2 tablets by mouth daily for 3 doses  Qty: 6 tablet, Refills: 0      digoxin (LANOXIN) 125 MCG tablet Take 1 tablet by mouth daily  Qty: 30 tablet, Refills: 3              Details   pantoprazole (PROTONIX) 40 MG tablet Take 1 tablet by mouth 2 times daily (before meals)  Qty: 30 tablet, Refills: 3      bumetanide (BUMEX) 2 MG tablet Take 1 tablet by mouth 2 times daily  Qty: 30 tablet, Refills: 3              Details   fluticasone (FLONASE) 50 MCG/ACT nasal spray 2 sprays by Nasal route daily      !! levothyroxine (SYNTHROID) 50 MCG tablet Take 50 mcg by mouth Daily Given with Levothyroxine 200 mcg total dose Levothyroxine 250 mcg      !! levothyroxine (SYNTHROID) 200 MCG tablet Take 200 mcg by mouth Daily Given with Levothyroxine 50 mcg total dose Levothyroxine 250 mcg      metoprolol succinate (TOPROL XL) 50 MG extended release tablet Take 50 mg by mouth daily      ketotifen (ZADITOR) 0.025 % ophthalmic solution Place 1 drop into both eyes 2 times daily as needed      albuterol sulfate HFA (VENTOLIN HFA) 108 (90 Base) MCG/ACT inhaler Inhale 1 puff into the lungs every 6 hours as needed for Wheezing      terazosin (HYTRIN) 2 MG capsule Take 4 mg by mouth nightly      Budeson-Glycopyrrol-Formoterol (BREZTRI AEROSPHERE) 160-9-4.8 MCG/ACT AERO Inhale 2 puffs into the lungs 2 times daily      albuterol (PROVENTIL) (2.5 MG/3ML) 0.083% nebulizer solution Take 2.5 mg by nebulization every 6 hours as needed for Wheezing      sodium chloride (OCEAN) 0.65 % nasal spray 1 spray by Nasal route daily      OXYGEN Inhale 4 L into the lungs continuous      rivaroxaban (XARELTO) 15 MG TABS tablet Take 15 mg by mouth Daily with supper       vitamin D 25 MCG (1000 UT) CAPS Take 1,000 Units by mouth daily       sucralfate (CARAFATE) 1 GM tablet Take 1 tablet by mouth three times daily  Qty: 120 tablet, Refills: 0       !! - Potential duplicate medications found. Please discuss with provider. Time Spent on discharge is more than 45 minutes in the examination, evaluation, counseling and review of medications and discharge plan.       Signed:    Fred Lemon MD   5/11/2022

## 2022-05-13 LAB — CYCLIC CITRULLINATED PEPTIDE ANTIBODY IGG: 2.4 U/ML (ref 0–7)

## 2022-09-25 NOTE — PROGRESS NOTES
Problem: Pain  Goal: #Acceptable pain level achieved/maintained at rest using NRS/Faces  Description: This goal is used for patients who can self-report.  Acceptable means the level is at or below the identified comfort/function goal.  Outcome: Outcome Met, Continue evaluating goal progress toward completion     Problem: Elimination-Bowel, Alteration  Goal: # Bowel function maintained/improved  Outcome: Outcome Met, Continue evaluating goal progress toward completion     Problem: Pressure Injury, Risk for  Goal: No new pressure injury (PI) development  Outcome: Outcome Met, Continue evaluating goal progress toward completion      DAILY    +++++++++++++++++++++++++++++++++++++++++++++++++  [address], [place]  +++++++++++++++++++++++++++++++++++++++++++++++++  NOTE: This report was transcribed using voice recognition software. Every effort was made to ensure accuracy; however, inadvertent computerized transcription errors may be present.

## 2022-12-01 PROBLEM — E87.5 HYPERKALEMIA: Status: ACTIVE | Noted: 2022-01-01

## 2022-12-01 PROBLEM — T68.XXXA HYPOTHERMIA: Status: ACTIVE | Noted: 2022-01-01

## 2022-12-01 PROBLEM — J96.02 ACUTE RESPIRATORY FAILURE WITH HYPERCAPNIA (HCC): Status: ACTIVE | Noted: 2022-01-01

## 2022-12-01 NOTE — CONSULTS
Inpatient Cardiology Consultation      Reason for Consult: Ventricular tachycardia    Consulting Physician: Dr. Yesenia Vu    Requesting Physician: Dr. Minh Siegel    Previously under Dr. Sophie Castillo service and was seen as a new consult in 2018 was recently seen by Dr. Cal Hyde on 5/3/2022. Date of Consultation: 12/1/2022    HISTORY OF PRESENT ILLNESS:   Mr. Alisha Sherwood is a 44-year-old gentleman with history of abdominal aortic aneurysm, hypertension, permanent atrial fibrillation on chronic anticoagulation therapy with Xarelto, history of atrial flutter diagnosed 2018, chronic HFpEF with underlying RV dysfunction, pulm hypertension, valvular heart disease, pericardial effusion, hypothyroidism on HRT, obstructive sleep apnea noncompliant with CPAP use, severe COPD with chronic respiratory failure on home O2, former tobacco and alcohol abuse, GERD, history of recurrent GI bleed, EGD revealed severe gastritis mild duodenitis and healing antral ulcer ascites of undetermined etiology, CKD and history of small bowel obstruction diagnosed in 2016. Patient was recently hospitalized in May 2022 with decompensated heart failure. Currently patient is obtained and most of the history was gathered from medical records and no family members available  at the bedside. Patient was admitted to the hospital on 12/1/2022 with continued confusion and altered mental status. As per medical records patient has been confused for the past 2 to 3 days. When the daughter will call patient did not respond and when she got there she found him confused without oxygen with low oxygen levels. Patient was acting confused. Patient was brought to the emergency room for further evaluation. Please note: past medical records were reviewed per electronic medical record (EMR) - see detailed reports under Past Medical/ Surgical History.    Past Medical History:    Past Medical History:   Diagnosis Date    Abdominal aortic aneurysm (AAA) >39 mm diameter ([facility] [address]     Anemia     Cardiomyopathy ([facility] [address]     Carpal tunnel syndrome     CHF (congestive heart failure) ([facility] [address]     echo 3/5/16 ef 62%, stage 1 diastolic    COPD (chronic obstructive pulmonary disease) (HCC)     GERD (gastroesophageal reflux disease)     Hx of colonic polyps     Hypertension     Hypothyroidism     Rectal bleeding     Respiratory neoplasm     Sleep apnea     Tobacco use        Past Surgical History:    Past Surgical History:   Procedure Laterality Date    ABDOMINAL SURGERY      CARDIAC CATHETERIZATION      CHOLECYSTECTOMY      COLONOSCOPY      COLONOSCOPY N/A 12/9/2018    COLONOSCOPY DIAGNOSTIC performed by Henri Hyde MD at [address] [facility], ESOPHAGUS      ENDOSCOPY, COLON, DIAGNOSTIC      FRACTURE SURGERY      JOINT REPLACEMENT      SALIVARY GLAND SURGERY      UPPER GASTROINTESTINAL ENDOSCOPY N/A 12/9/2018    EGD DIAGNOSTIC ONLY performed by Henri Hyde MD at [address] [facility] 5/4/2022    EGD BIOPSY performed by Angelika Ruffin MD at [address] [place] [address]         Medications Prior to admit:  Prior to Admission medications    Medication Sig Start Date End Date Taking?  Authorizing Provider   digoxin (LANOXIN) 125 MCG tablet Take 1 tablet by mouth daily 5/12/22   Israel Clark MD   pantoprazole (PROTONIX) 40 MG tablet Take 1 tablet by mouth 2 times daily (before meals) 5/11/22   Israel Clark MD   bumetanide (BUMEX) 2 MG tablet Take 1 tablet by mouth 2 times daily 5/11/22   Israel Clark MD   fluticasone (FLONASE) 50 MCG/ACT nasal spray 2 sprays by Nasal route daily    Historical Provider, MD   levothyroxine (SYNTHROID) 50 MCG tablet Take 50 mcg by mouth Daily Given with Levothyroxine 200 mcg total dose Levothyroxine 250 mcg    Historical Provider, MD   levothyroxine (SYNTHROID) 200 MCG tablet Take 200 mcg by mouth Daily Given with Levothyroxine 50 mcg total dose Levothyroxine 250 mcg    Historical Provider, MD metoprolol succinate (TOPROL XL) 50 MG extended release tablet Take 50 mg by mouth daily    Historical Provider, MD   ketotifen (ZADITOR) 0.025 % ophthalmic solution Place 1 drop into both eyes 2 times daily as needed    Historical Provider, MD   albuterol sulfate HFA (VENTOLIN HFA) 108 (90 Base) MCG/ACT inhaler Inhale 1 puff into the lungs every 6 hours as needed for Wheezing    Historical Provider, MD   terazosin (HYTRIN) 2 MG capsule Take 4 mg by mouth nightly    Historical Provider, MD   Budeson-Glycopyrrol-Formoterol (BREZTRI AEROSPHERE) 160-9-4.8 MCG/ACT AERO Inhale 2 puffs into the lungs 2 times daily    Historical Provider, MD   albuterol (PROVENTIL) (2.5 MG/3ML) 0.083% nebulizer solution Take 2.5 mg by nebulization every 6 hours as needed for Wheezing    Historical Provider, MD   sodium chloride (OCEAN) 0.65 % nasal spray 1 spray by Nasal route daily    Historical Provider, MD   OXYGEN Inhale 4 L into the lungs continuous    Historical Provider, MD   rivaroxaban (XARELTO) 15 MG TABS tablet Take 15 mg by mouth Daily with supper     Historical Provider, MD   vitamin D 25 MCG (1000 UT) CAPS Take 1,000 Units by mouth daily     Historical Provider, MD   sucralfate (CARAFATE) 1 GM tablet Take 1 tablet by mouth three times daily 12/13/18 1/12/19  Carol Kahn MD       Current Medications:    Current Facility-Administered Medications: glucose chewable tablet 16 g, 4 tablet, Oral, PRN  dextrose bolus 10% 125 mL, 125 mL, IntraVENous, PRN **OR** dextrose bolus 10% 250 mL, 250 mL, IntraVENous, PRN  glucagon (rDNA) injection 1 mg, 1 mg, SubCUTAneous, PRN  dextrose 10 % infusion, , IntraVENous, Continuous PRN    Allergies:  Apresoline [hydralazine], Cozaar [losartan], Nifedipine, Norvasc [amlodipine], Hydrochlorothiazide, Isosorbide mononitrate [isosorbide nitrate], and Prinivil [lisinopril]    Social History:    Social History     Socioeconomic History    Marital status:      Spouse name: Not on file    Number of children: Not on file    Years of education: Not on file    Highest education level: Not on file   Occupational History    Not on file   Tobacco Use    Smoking status: Former     Types: Cigarettes     Quit date: 2000     Years since quittin.9    Smokeless tobacco: Never   Substance and Sexual Activity    Alcohol use: Yes     Alcohol/week: 21.0 standard drinks     Types: 21 Shots of liquor per week     Comment: hasn't drank in three weeks    Drug use: Not on file    Sexual activity: Not on file   Other Topics Concern    Not on file   Social History Narrative    Not on file     Social Determinants of Health     Financial Resource Strain: Not on file   Food Insecurity: Not on file   Transportation Needs: Not on file   Physical Activity: Not on file   Stress: Not on file   Social Connections: Not on file   Intimate Partner Violence: Not on file   Housing Stability: Not on file       Family History:   No family history on file. REVIEW OF SYSTEMS:     Unable to obtain review of systems due to significant confusion    PHYSICAL EXAM:   /83   Pulse 75   Temp (!) 87.3 °F (30.7 °C) (Bladder)   Resp 18   Ht 6' (1.829 m)   SpO2 99%   BMI 38.82 kg/m²   CONST:  Well developed, well nourished who appears of stated age. Awake, alert and cooperative. Patient appears confused but responds to simple commands. Patient on BiPAP and has a heating blanket. Patient complaining of cold  HEENT:   Head- Normocephalic, atraumatic   Eyes- Conjunctivae pink, anicteric  Throat- Oral mucosa pink and moist  Neck-  No stridor, trachea midline, no jugular venous distention. No carotid bruit. CHEST: Chest symmetrical and non-tender to palpation.  No accessory muscle use or intercostal retractions  RESPIRATORY: Lung sounds - clear throughout fields   CARDIOVASCULAR:     Heart Inspection- shows no noted pulsations  Heart Palpation- no heaves or thrills; PMI is non-displaced   Heart Ausculation-irregularly irregular rate and rhythm, rate controlled no murmur. No s3, s4 or rub   PV: No lower extremity edema. No varicosities. Pedal pulses palpable, no clubbing or cyanosis   ABDOMEN: Soft, non-tender to light palpation. Bowel sounds present. No palpable masses no organomegaly; no abdominal bruit  MS: Good muscle strength and tone. No atrophy or abnormal movements. : Deferred  SKIN: Warm and dry no statis dermatitis or ulcers   NEURO / PSYCH: Oriented to self. Follows all commands. Pleasant affect, full neurological exam was not performed    DATA:    ECG / Tele strips: please see HPI   Diagnostic:      Intake/Output Summary (Last 24 hours) at 12/1/2022 1459  Last data filed at 12/1/2022 1452  Gross per 24 hour   Intake 150 ml   Output --   Net 150 ml       Labs:   CBC:   Recent Labs     12/01/22  1158   WBC 3.5*   HGB 11.4*   HCT 44.0   PLT 95*     BMP:   Recent Labs     12/01/22  1158      K 6.5*   CO2 34*   BUN 54*   CREATININE 3.4*   LABGLOM 18   CALCIUM 8.9     Mag: No results for input(s): MG in the last 72 hours. Phos: No results for input(s): PHOS in the last 72 hours. TSH:   Recent Labs     12/01/22  1158   TSH 34.360*     HgA1c:   Lab Results   Component Value Date    LABA1C 6.3 (H) 03/05/2016     No results found for: EAG  proBNP:   Recent Labs     12/01/22  1158   PROBNP 2,967*     PT/INR:   Recent Labs     12/01/22  1158   PROTIME 16.4*   INR 1.4     APTT:No results for input(s): APTT in the last 72 hours. CARDIAC ENZYMES:No results for input(s): CKTOTAL, CKMB, CKMBINDEX, TROPONINI in the last 72 hours.   FASTING LIPID PANEL:  Lab Results   Component Value Date/Time    CHOL 91 05/03/2022 04:31 AM    HDL 37 05/03/2022 04:31 AM    LDLCALC 44 05/03/2022 04:31 AM    TRIG 49 05/03/2022 04:31 AM     LIVER PROFILE:  Recent Labs     12/01/22  1158   AST 20   ALT 17   LABALBU 3.3*     Telemetry-atrial fibrillation with controlled rate and a 2 episodes of nonsustained VT    EKG: Baseline artifact with underlying atrial fibrillation with controlled rate, right bundle branch block, anteroseptal infarct age undetermined, abnormal EKG. Vitals and labs were reviewed: As above blood pressure 112/83 heart rate 64 sats 99% on 6 L of O2 by nasal cannula    Labs-ABG pH 7.2 PCO2 125, PO2 64 O2 sats 88%, bicarb 42, potassium 6.5, BUN/creatinine 54/3.4, proBNP 2967, troponin, high-sensitivity 60, liver function normal, TSH 34.3 Free T4 0.82 WBC 3.5 hemoglobin 11.4, platelets 95, INR 1.4, urinalysis positive for trace amount of leukocytes WBC 0-1    CT head-NAD    Chest x-ray cardiomegaly with moderate interstitial edema    Changes chest without contrast: Cardiomegaly with bilateral pleural effusions right more than left    TTE-6/19/2020:   Ejection fraction is visually estimated at 55%. No regional wall motion abnormalities seen. Mild left ventricular concentric hypertrophy noted. Dilated right ventricle with reduced function. TAPSE is 1.1 cm. Left atrial volume index of 34 ml per meters squared BSA. Physiologic and/or trace mitral regurgitation is present. Mild tricuspid regurgitation. Pulmonary hypertension is moderate. RVSP is 58 mmHg. There is a small localized near left ventricle pericardial effusion noted. TTE-5/2/2022:  Atrial fibrillation noted. Micro-bubble contrast injected to enhance left ventricular visualization. Normal left ventricular size and systolic function. Ejection fraction is visually estimated at 60-65%. Indeterminate diastolic function. No regional wall motion abnormalities seen. Severe left ventricular concentric hypertrophy noted. Severely dilated right ventricle with reduced function. Biatrial dilation. No significant valvular abnormalities.       Impression:  Nonsustained VT in the setting of multiple electrolyte abnormalities  Hyperkalemia with potassium 6.2  Metabolic encephalopathy secondary to hypercapnic respiratory failure, hypothermia, myxedema  Acute hypoxic and hypercapnic respiratory failure  Hypothyroidism with myxedema  Acute on chronic HFpEF  Permanent atrial fibrillation on Xarelto for anticoagulation  Chronic RV dysfunction with pulm hypertension  Severe COPD with chronic respiratory failure  Former smoker  GERD  History of recurrent GI bleed and EGD showing gastritis, duodenitis and antral ulcer  CKD stage IV, creatinine 3.3      Plan:  Patient had couple of episodes of nonsustained VT in the setting of electrolyte abnormalities. will start him back on his Toprol dose  if remains hemodynamically stable.    Start on Lasix 40 mg IV daily with close monitoring of renal functions and electrolytes  Hyperkalemia and CKD management as per renal service  Continue supportive care  Management of hypothermia myxedema coma as per critical care and primary service  Respiratory made failure management as per critical care service  Recent echo done on 5/2/2022 was reviewed, as above    Thank you for consulting and allowing us to participate in Mr. Rose Marie Rebolledo MD, [facility] cardiology    Electronically signed by Mera Fairbanks MD on 12/1/2022 at 2:59 PM

## 2022-12-01 NOTE — CONSULTS
Department of Internal Medicine  Nephrology Nurse Practitioner Consult Note      Reason for Consult:  JUDITH, hyperkalemia  Requesting Physician:  Terese Espinal DO    CHIEF COMPLAINT:  AMS    History Obtained From:  patient, electronic medical record    HISTORY OF PRESENT ILLNESS:  AMS x 2-3 days    Past Medical History:        Diagnosis Date    Abdominal aortic aneurysm (AAA) >39 mm diameter (HCC)     Anemia     Cardiomyopathy ([facility] [address]     Carpal tunnel syndrome     CHF (congestive heart failure) ([facility] [address]     echo 3/5/16 ef 33%, stage 1 diastolic    COPD (chronic obstructive pulmonary disease) (HCC)     GERD (gastroesophageal reflux disease)     Hx of colonic polyps     Hypertension     Hypothyroidism     Rectal bleeding     Respiratory neoplasm     Sleep apnea     Tobacco use      Past Surgical History:        Procedure Laterality Date    ABDOMINAL SURGERY      CARDIAC CATHETERIZATION      CHOLECYSTECTOMY      COLONOSCOPY      COLONOSCOPY N/A 12/9/2018    COLONOSCOPY DIAGNOSTIC performed by Davide Hines MD at [address] [place] [address], ESOPHAGUS      ENDOSCOPY, COLON, DIAGNOSTIC      FRACTURE SURGERY      JOINT REPLACEMENT      SALIVARY GLAND SURGERY      UPPER GASTROINTESTINAL ENDOSCOPY N/A 12/9/2018    EGD DIAGNOSTIC ONLY performed by Davide Hines MD at [place] [address] N/A 5/4/2022    EGD BIOPSY performed by Phoebe Box MD at [address] [place] [address]       Current Medications:    Current Facility-Administered Medications: glucose chewable tablet 16 g, 4 tablet, Oral, PRN  dextrose bolus 10% 125 mL, 125 mL, IntraVENous, PRN **OR** dextrose bolus 10% 250 mL, 250 mL, IntraVENous, PRN  glucagon (rDNA) injection 1 mg, 1 mg, SubCUTAneous, PRN  dextrose 10 % infusion, , IntraVENous, Continuous PRN  0.9 % sodium chloride bolus, 1,000 mL, IntraVENous, Once  Allergies:  Apresoline [hydralazine], Cozaar [losartan], Nifedipine, Norvasc [amlodipine], Hydrochlorothiazide, Isosorbide mononitrate [isosorbide nitrate], and Prinivil [lisinopril]    Social History:    TOBACCO:   reports that he quit smoking about 22 years ago. He has never used smokeless tobacco.  ETOH:   reports current alcohol use of about 21.0 standard drinks per week. Family History:   No family history on file.   REVIEW OF SYSTEMS:    Review of systems not obtained due to patient factors - mental status  PHYSICAL EXAM:      Vitals:    VITALS:  BP 95/77   Pulse 63   Temp (!) 87.3 °F (30.7 °C) (Bladder)   Resp 25   Ht 6' (1.829 m)   SpO2 98%   BMI 38.82 kg/m²   24HR BLOOD PRESSURE RANGE:  Systolic (43QOC), VFZ:33 , Min:92 , TE  ; Diastolic (77RGC), A, Min:72, Max:77   24HR INTAKE/OUTPUT:  No intake or output data in the 24 hours ending 22 1422    Constitutional:    HEENT:    Respiratory:    Cardiovascular/Edema:    Gastrointestinal:    Neurologic:    Skin:    Other:      DATA:    CBC with Differential:    Lab Results   Component Value Date/Time    WBC 3.5 2022 11:58 AM    RBC 4.69 2022 11:58 AM    HGB 11.4 2022 11:58 AM    HCT 44.0 2022 11:58 AM    PLT 95 2022 11:58 AM    MCV 93.8 2022 11:58 AM    MCH 24.3 2022 11:58 AM    MCHC 25.9 2022 11:58 AM    RDW 28.6 2022 11:58 AM    NRBC 3.0 2022 11:58 AM    METASPCT 0.9 2020 02:20 AM    LYMPHOPCT 6.0 2022 11:58 AM    MONOPCT 7.0 2022 11:58 AM    BASOPCT 0.0 2022 11:58 AM    MONOSABS 0.24 2022 11:58 AM    LYMPHSABS 0.21 2022 11:58 AM    EOSABS 0.00 2022 11:58 AM    BASOSABS 0.00 2022 11:58 AM     CMP:    Lab Results   Component Value Date/Time     2022 11:58 AM    K 6.5 2022 11:58 AM    K 4.0 2022 10:58 AM    CL 94 2022 11:58 AM    CO2 34 2022 11:58 AM    BUN 54 2022 11:58 AM    CREATININE 3.4 2022 11:58 AM    GFRAA 51 05/10/2022 05:17 AM    LABGLOM 18 2022 11:58 AM GLUCOSE 86 12/01/2022 11:58 AM    PROT 7.4 12/01/2022 11:58 AM    LABALBU 3.3 12/01/2022 11:58 AM    CALCIUM 8.9 12/01/2022 11:58 AM    BILITOT 0.6 12/01/2022 11:58 AM    ALKPHOS 119 12/01/2022 11:58 AM    AST 20 12/01/2022 11:58 AM    ALT 17 12/01/2022 11:58 AM     Magnesium:    Lab Results   Component Value Date/Time    MG 2.2 05/07/2022 05:56 AM     Phosphorus:    Lab Results   Component Value Date/Time    PHOS 4.5 03/05/2016 06:52 AM     Radiology Review:        IMPRESSION/RECOMMENDATIONS:      JUDITH stage on CKD    Hyperkalemia, treated, repeat BMP  Respiratory acidosis, metabolic acidosis, pH 6.484>>9.117, pCO2 92.8>>125.1, HCO3 31.5>>41.5  CKD stage 3b, baseline creatinine 1.6-2.1  HTN, on metoprolol  HFpEF 60-65% 5/2/22, on bumex  BPH, on terazosin   PAF, on digoxin, xaralto  COPD,   Vitamin D deficiency, on supplement   Hypothyroidism, on levothyroxine   Hx GI bleed, on protonix, carafate       Plan:   Monitor BMP   Bicarb gtt  Urine indices

## 2022-12-01 NOTE — PROGRESS NOTES
Attending Physician Attestation: Dr. Estefanía Cr    Thank you very much for allowing me to see this patient in consultation and follow up. I personally saw, examined and provided care for the patient. Radiographs, labs and medication list were reviewed by me independently. I spoke with bedside nursing, respiratory therapists and consultants. Critical care services and times documented are independent of procedures and multidisciplinary rounds with Residents. Additionally comprehensive, multidisciplinary rounds were conducted with the MICU team. The case was discussed in detail and plans for care were established. Review of Residents documentation was conducted and revisions were made as appropriate. I agree with the the above documented information.      Current Facility-Administered Medications   Medication Dose Route Frequency Provider Last Rate Last Admin    glucose chewable tablet 16 g  4 tablet Oral PRN Candace J Elizabeth, DO        dextrose bolus 10% 125 mL  125 mL IntraVENous PRN Candace J Elizabeth, DO        Or    dextrose bolus 10% 250 mL  250 mL IntraVENous PRN Elle Loge, .5 mL/hr at 12/01/22 1651 250 mL at 12/01/22 1651    glucagon (rDNA) injection 1 mg  1 mg SubCUTAneous PRN Candace J Elizabeth, DO        dextrose 10 % infusion   IntraVENous Continuous PRN Candace J Elizabeth, DO        furosemide (LASIX) injection 40 mg  40 mg IntraVENous Daily Candace J Bayard, DO   40 mg at 12/01/22 6376    levothyroxine (SYNTHROID) tablet 200 mcg  200 mcg Oral Daily Candace J Bayard, DO        metoprolol succinate (TOPROL XL) extended release tablet 50 mg  50 mg Oral Daily Candace J Elizabeth, DO        pantoprazole (PROTONIX) tablet 40 mg  40 mg Oral BID AC Candace J Bayard, DO        rivaroxaban (XARELTO) tablet 15 mg  15 mg Oral Dinner Candace J Elizabeth, DO        sucralfate (CARAFATE) tablet 1 g  1 g Oral TID Candace J Elizabeth, DO        sodium chloride flush 0.9 % injection 5-40 mL  5-40 mL IntraVENous 2 times per day Candace J Elizabeth, DO sodium chloride flush 0.9 % injection 5-40 mL  5-40 mL IntraVENous PRN Candace J Elizabeth, DO        0.9 % sodium chloride infusion   IntraVENous PRN Candace J Ray, DO        ondansetron (ZOFRAN-ODT) disintegrating tablet 4 mg  4 mg Oral Q8H PRN Candace J Elizabeth, DO        Or    ondansetron (ZOFRAN) injection 4 mg  4 mg IntraVENous Q6H PRN Candace J Ray, DO        polyethylene glycol (GLYCOLAX) packet 17 g  17 g Oral Daily PRN Candace J Ray, DO        acetaminophen (TYLENOL) tablet 650 mg  650 mg Oral Q6H PRN Candace J Elizabeth, DO        Or    acetaminophen (TYLENOL) suppository 650 mg  650 mg Rectal Q6H PRN Candace J Ray, DO        lactulose (CHRONULAC) 10 GM/15ML solution 20 g  20 g Oral TID Candace J Elizabeth, DO            CT CHEST WO CONTRAST   Final Result   Cardiomegaly and bilateral pleural effusions more prominent on the right. Mild stranding of the peritoneal fat planes is subtle free fluid visualized   in the paracolic gutters. CT ABDOMEN PELVIS WO CONTRAST Additional Contrast? None   Final Result   Cardiomegaly and bilateral pleural effusions more prominent on the right. Mild stranding of the peritoneal fat planes is subtle free fluid visualized   in the paracolic gutters. XR CHEST PORTABLE   Final Result   Cardiomegaly with moderate interstitial pulmonary edema. CT to follow. CT Head W/O Contrast   Final Result   No acute intracranial abnormality. Chronic microvascular disease, no evidence of acute territorial infarct is   seen.              Physical Examination:  Vitals:   Vitals:    12/01/22 1618 12/01/22 1636 12/01/22 1718 12/01/22 1810   BP:  98/81 89/64    Pulse:  66 72    Resp: 23 20 20 17   Temp:  (!) 88 °F (31.1 °C) (!) 88.5 °F (31.4 °C)    TempSrc:  Bladder Bladder    SpO2:  95% 95%    Height:          General: No Acute Distress  HEENT: normocephalic, atraumatic  Abdomen: soft, NT, ND  CVS: RRR, S1, S2, No S3 or S4  Respiratory: decreased breath sounds at lung bases, no focal wheezes noted  Extremities: no clubbing/cyanosis/edema  Neuro: confused, on AVAPS     CT CHEST WO CONTRAST   Final Result   Cardiomegaly and bilateral pleural effusions more prominent on the right. Mild stranding of the peritoneal fat planes is subtle free fluid visualized   in the paracolic gutters. CT ABDOMEN PELVIS WO CONTRAST Additional Contrast? None   Final Result   Cardiomegaly and bilateral pleural effusions more prominent on the right. Mild stranding of the peritoneal fat planes is subtle free fluid visualized   in the paracolic gutters. XR CHEST PORTABLE   Final Result   Cardiomegaly with moderate interstitial pulmonary edema. CT to follow. CT Head W/O Contrast   Final Result   No acute intracranial abnormality. Chronic microvascular disease, no evidence of acute territorial infarct is   seen. ASSESSMENT:  Myxedema Coma  Non-Sustained Ventricular Tachycardia   B/L Pleural Effusions  Subsegmental Atelectasis   obstructive sleep apnea noncompliant with CPAP use  COPD with Acute Exacerbation   Acute on Chronic Hypoxic and Hypercapnic Respiratory Failure   Obesity - BMI 38  Hyperkalemia - suspect relative acidosis  GERD  JUDITH on CKD  Tobacco Use  Alcohol Use  Permanent atrial fibrillation on Xarelto for anticoagulation  Calcified Lung Nodules   Cardiomegaly     In addition the following applies:    - correct electrolyte imbalances, insulin, D50, lokelma  - IV synthroid with IV solu-cortef  - ESR, CRP, Procalcitonin  - empiric Doxycycline, Ceftriaxone     Thank you for allowing me to participate in the care of this patient. Care reviewed with nursing staff, medical and surgical specialty care, primary care and the patient's family as available. Restraints are ordered when the patient can do harm to him/herself by pulling out devices. Critical Care Time: 35 minutes excluding procedures    Yulia Peguero M.D.     Yulia Peguero MD  12/1/2022  6:17 PM

## 2022-12-01 NOTE — CONSULTS
Critical Care Admit/Consult Note         Patient - Sangeetha Mccauley Sr. MRN -  [id number]   [facility]   - 1945      Date of Admission -  2022 11:14 AM  Date of evaluation -  2022   Hospital Day - 0            ADMIT/CONSULT DETAILS     Reason for Admit/Consult   Hypothermia     Consulting Service/Physician   Consulting - Noel Ortega DO  Primary Care Physician - Pretty De Santiago DO     ICU attending - Dr. Cherelle MCGREGOR   The patient is a 78 y.o. male with significant past medical history of hypothyroidism, HFpEF EF 63%, CKD, HTN, AAA, COPD on home O2, sleep apnea, A.fib on Xarelto, GERD, GI bleed presents with Altered Mental Status (Patient was at home and not wearing oxygen (normally wears 3L NC)) and Extremity Weakness (Increased weakness)    He is a 42-year-old male with a history of hypothyroidism, HFpEF, CKD, HTN, AAA, COPD on 3L home O2, sleep apnea, A.fib on Xarelto, GERD, GI bleed presenting to the ED for altered mental status. Per the patient's daughter, the patient has been getting more confused for the last 2 to 3 days. She visited him today and noticed he had his home O2 off and was hypoxic. He was brought to the ED where he was found to be hypothermic with a core temperature of 88 Fahrenheit. He was also found to be hypotensive to 98/81. He was given 1 bolus of normal saline with some improvement. Potassium 6.5. He was given 2L NS bolus, albuterol, calcium gluconate, and regular insulin. BUN/CR 54/3.4, BNP 2967 however improved from baseline, troponin 60 consistent with baseline, ammonia slightly elevated to 60.4, TSH elevated to 34.36 with free T4 decreased to 0.82 and free T3 decreased to 1.2. Hb 11.4. WBC 3.5. Platelets 95.  UA not concerning for infection. ABG shows respiratory acidosis 7.149/92.8/123.3/31.5. He was placed on AVAPS. CT head shows no acute intracranial abnormality. CT chest shows bilateral pleural effusions more prominent on the right. CT abdomen shows mild stranding of the peritoneal fat planes and subtle free fluid visualized in the paracolic gutters.         Awake and following commands: yes  Current Ventilation: - No ventilator support  Secretions: none  Sedation: none  Paralyzed: No  Vasopressors: None    Past Medical History         Diagnosis Date    Abdominal aortic aneurysm (AAA) >39 mm diameter (HCC)     Anemia     Cardiomyopathy (HCC)     Carpal tunnel syndrome     CHF (congestive heart failure) ([address]     echo 3/5/16 ef 26%, stage 1 diastolic    COPD (chronic obstructive pulmonary disease) (HCC)     GERD (gastroesophageal reflux disease)     Hx of colonic polyps     Hypertension     Hypothyroidism     Rectal bleeding     Respiratory neoplasm     Sleep apnea     Tobacco use         Past Surgical History           Procedure Laterality Date    ABDOMINAL SURGERY      CARDIAC CATHETERIZATION      CHOLECYSTECTOMY      COLONOSCOPY      COLONOSCOPY N/A 12/9/2018    COLONOSCOPY DIAGNOSTIC performed by Dixie Tenorio MD at [facility], ESOPHAGUS      ENDOSCOPY, COLON, DIAGNOSTIC      FRACTURE SURGERY      JOINT REPLACEMENT      SALIVARY GLAND SURGERY      UPPER GASTROINTESTINAL ENDOSCOPY N/A 12/9/2018    EGD DIAGNOSTIC ONLY performed by Dixie Tenorio MD at [address] [place] [address] 5/4/2022    EGD BIOPSY performed by [place] Ortega MD at [address] [facility]:    Social History       Tobacco History       Smoking Status  Former Quit Date  1/1/2000 Smoking Tobacco Type  Cigarettes quit in 1/1/2000      Smokeless Tobacco Use  Never                    Current Medications   Current Medications    furosemide  40 mg IntraVENous Daily    Budeson-Glycopyrrol-Formoterol  2 puff Inhalation BID    levothyroxine  200 mcg Oral Daily    metoprolol succinate  50 mg Oral Daily    pantoprazole  40 mg Oral BID AC    rivaroxaban  15 mg Oral Dinner sucralfate  1 g Oral TID    sodium chloride flush  5-40 mL IntraVENous 2 times per day     glucose, dextrose bolus **OR** dextrose bolus, glucagon (rDNA), dextrose, sodium chloride flush, sodium chloride, ondansetron **OR** ondansetron, polyethylene glycol, acetaminophen **OR** acetaminophen  IV Drips/Infusions   dextrose      sodium chloride       Home Medications  Not in a hospital admission. Diet/Nutrition   ADULT DIET; Regular; Low Potassium (Less than 3000 mg/day)    Allergies   Apresoline [hydralazine], Cozaar [losartan], Nifedipine, Norvasc [amlodipine], Hydrochlorothiazide, Isosorbide mononitrate [isosorbide nitrate], and Prinivil [lisinopril]    Social History   Tobacco   reports that he quit smoking about 22 years ago. He has never used smokeless tobacco.    Alcohol     reports current alcohol use of about 21.0 standard drinks per week. Occupational history/exposure? Family History   No family history on file. ROS   Review of Systems   Constitutional:  Negative for chills and fever. HENT:  Negative for ear pain and rhinorrhea. Eyes:  Negative for pain. Respiratory:  Negative for cough and shortness of breath. Cardiovascular:  Negative for chest pain and palpitations. Gastrointestinal:  Negative for abdominal pain, constipation, diarrhea, nausea and vomiting. Genitourinary:  Negative for difficulty urinating and dysuria. Musculoskeletal:  Negative for arthralgias and myalgias. Skin:  Negative for rash. Neurological:  Negative for dizziness and headaches. Psychiatric/Behavioral:  Positive for confusion. All other systems reviewed and are negative.     Mechanical Ventilation Data   VENT SETTINGS (Comprehensive)     Additional Respiratory Assessments  Heart Rate: 66  Resp: 20  SpO2: 95 %    ABG  Lab Results   Component Value Date/Time    PH 7.207 12/01/2022 03:29 PM    PCO2 81.3 12/01/2022 03:29 PM    PO2 124.9 12/01/2022 03:29 PM    HCO3 31.6 12/01/2022 03:29 PM    O2SAT 98.3 2022 03:29 PM     Lab Results   Component Value Date/Time    MODE AVAPS 2022 03:29 PM       Vitals    height is 6' (1.829 m). His bladder temperature is 88 °F (31.1 °C) (abnormal). His blood pressure is 98/81 and his pulse is 66. His respiration is 20 and oxygen saturation is 95%. Temperature Range: Temp: (!) 88 °F (31.1 °C) Temp  Av.2 °F (30.7 °C)  Min: 85.5 °F (29.7 °C)  Max: 88 °F (31.1 °C)  BP Range:  Systolic (45UGX), YOU:237 , Min:92 , TVB:276     Diastolic (41VVV), NWA:43, Min:65, Max:83    Pulse Range: Pulse  Av  Min: 63  Max: 77  Respiration Range: Resp  Av.1  Min: 11  Max: 25  Current Pulse Ox[de-identified]  SpO2: 95 %  24HR Pulse Ox Range:  SpO2  Av.2 %  Min: 95 %  Max: 99 %  Oxygen Amount and Delivery: O2 Flow Rate (L/min): 6 L/min    I/O (24 Hours)  Patient Vitals for the past 8 hrs:   BP Temp Temp src Pulse Resp SpO2 Height   22 1636 98/81 (!) 88 °F (31.1 °C) Bladder 66 20 95 % --   22 1618 -- -- -- -- 23 -- --   22 1532 114/65 (!) 87.4 °F (30.8 °C) Bladder 75 16 99 % --   22 1507 -- (!) 87.4 °F (30.8 °C) Bladder -- -- -- --   22 1448 112/83 (!) 87.3 °F (30.7 °C) Bladder 75 18 99 % --   22 1445 -- -- -- -- 22 -- --   22 1404 -- -- -- 63 25 98 % --   22 1403 -- -- -- 67 18 97 % --   22 1402 -- -- -- 75 25 97 % --   22 1401 -- -- -- 64 14 97 % --   22 1400 -- -- -- 72 11 96 % --   22 1318 95/77 (!) 87.3 °F (30.7 °C) Bladder 67 13 97 % 6' (1.829 m)   22 1241 92/72 -- -- 69 24 96 % --   22 1222 -- (!) 85.5 °F (29.7 °C) Bladder -- -- -- --   22 1138 -- -- -- 77 -- 98 % 6' (1.829 m)       Intake/Output Summary (Last 24 hours) at 2022 1723  Last data filed at 2022 1452  Gross per 24 hour   Intake 150 ml   Output --   Net 150 ml     No intake/output data recorded.    Date 22 - 22 5517   Shift 7120-5781 6781-1225 2554-0505 24 Hour Total   INTAKE   IV Piggyback  150 150   Shift Total  150  150   OUTPUT   Shift Total       Weight (kg)         No data found. Exam   Physical Exam  Constitutional:       Appearance: Normal appearance. HENT:      Head: Normocephalic and atraumatic. Nose: Nose normal.   Eyes:      Extraocular Movements: Extraocular movements intact. Pupils: Pupils are equal, round, and reactive to light. Cardiovascular:      Rate and Rhythm: Normal rate and regular rhythm. Pulmonary:      Comments: Diminished breath sounds. On AVAPS  Abdominal:      General: Abdomen is flat. Palpations: Abdomen is soft. Tenderness: There is no abdominal tenderness. Musculoskeletal:         General: Normal range of motion. Cervical back: Normal range of motion. Right lower leg: Edema present. Left lower leg: Edema present. Skin:     General: Skin is warm and dry. Comments: Dry skin to bilateral legs that is weeping, that have been wrapped    Neurological:      General: No focal deficit present. Mental Status: He is alert.    Psychiatric:         Behavior: Behavior normal.       Data   Old records and images have been reviewed    Lab Results   CBC     Lab Results   Component Value Date/Time    WBC 3.5 12/01/2022 11:58 AM    RBC 4.69 12/01/2022 11:58 AM    HGB 11.4 12/01/2022 11:58 AM    HCT 44.0 12/01/2022 11:58 AM    PLT 95 12/01/2022 11:58 AM    MCV 93.8 12/01/2022 11:58 AM    MCH 24.3 12/01/2022 11:58 AM    MCHC 25.9 12/01/2022 11:58 AM    RDW 28.6 12/01/2022 11:58 AM    NRBC 3.0 12/01/2022 11:58 AM    METASPCT 0.9 06/19/2020 02:20 AM    LYMPHOPCT 6.0 12/01/2022 11:58 AM    MONOPCT 7.0 12/01/2022 11:58 AM    BASOPCT 0.0 12/01/2022 11:58 AM    MONOSABS 0.24 12/01/2022 11:58 AM    LYMPHSABS 0.21 12/01/2022 11:58 AM    EOSABS 0.00 12/01/2022 11:58 AM    BASOSABS 0.00 12/01/2022 11:58 AM       BMP   Lab Results   Component Value Date/Time     12/01/2022 02:38 PM    K 6.2 12/01/2022 02:38 PM    K 6.5 12/01/2022 11:58 AM    CL 95 12/01/2022 02:38 PM    CO2 33 12/01/2022 02:38 PM    BUN 52 12/01/2022 02:38 PM    CREATININE 3.3 12/01/2022 02:38 PM    GLUCOSE 128 12/01/2022 02:38 PM    CALCIUM 8.6 12/01/2022 02:38 PM       LFTS  Lab Results   Component Value Date/Time    ALKPHOS 119 12/01/2022 11:58 AM    ALT 17 12/01/2022 11:58 AM    AST 20 12/01/2022 11:58 AM    PROT 7.4 12/01/2022 11:58 AM    BILITOT 0.6 12/01/2022 11:58 AM    LABALBU 3.3 12/01/2022 11:58 AM       INR  Recent Labs     12/01/22  1158   PROTIME 16.4*   INR 1.4       APTT  No results for input(s): APTT in the last 72 hours. Lactic Acid  Lab Results   Component Value Date/Time    LACTA 2.1 12/01/2022 11:58 AM    LACTA 1.2 12/07/2018 09:00 AM        BNP   No results for input(s): BNP in the last 72 hours. Cultures     No results for input(s): BC in the last 72 hours. No results for input(s): Timi Stade in the last 72 hours. No results for input(s): LABURIN in the last 72 hours. Radiology     CT CHEST WO CONTRAST   Final Result   Cardiomegaly and bilateral pleural effusions more prominent on the right. Mild stranding of the peritoneal fat planes is subtle free fluid visualized   in the paracolic gutters. CT ABDOMEN PELVIS WO CONTRAST Additional Contrast? None   Final Result   Cardiomegaly and bilateral pleural effusions more prominent on the right. Mild stranding of the peritoneal fat planes is subtle free fluid visualized   in the paracolic gutters. XR CHEST PORTABLE   Final Result   Cardiomegaly with moderate interstitial pulmonary edema. CT to follow. CT Head W/O Contrast   Final Result   No acute intracranial abnormality. Chronic microvascular disease, no evidence of acute territorial infarct is   seen.                  SYSTEMS ASSESSMENT    Neuro  #Altered mental status and hypothermia d/t myxedema coma  - IV levothyroxine   - IV solu-cortef   - bear hugger, serial temperature    Respiratory  #Hypercarbia/hypoxic respiratory failure   #H/o COPD on home O2  - AVAPS  - nebulizers      #Bilateral pleural effusions  - Lasix     Cardiovascular  #H/o HTN  - continue home Metoprolol     #H/o A.fib  - continue home Xarelto    #H/o HFpEF 63%    Cardiology following     GI  #H/o GERD  #H/o GI bleed  - protonix     Renal  #Hyperkalemia  - give lokelma, 10 units insulin, D50  - bicarb infusion  - follow BMP    #Lactic acidosis  #CKD  - IVF   - follow BMP    Nephrology following     Infectious disease  #No active concerns   - blood cultures pending   - empiric ceftriaxone and doxycycline    Heme/Onc  #No active concerns  - on Xarelto     Endocrine  #Myxedema coma  - repeat TSH and T4  - IV levothyroxine   - IV solu-cortef     Social/Spiritual/DNR/Other  Code status: full code  Diet ADULT DIET; Regular; Low Potassium (Less than 3000 mg/day)  Stress ulcer prophylaxis: Protonix   DVT prophylaxis:  on Xarelto for A.fib  Consultations needed: Yes  Transfer out of ICU today?  No    Lines/catheters  Date 12/1/22  Peripheral IV left and right antecubital     Plan:  Insulin, D50, lokelma, bicarb drip for hyperkalemia-- nephrology following  Follow BMP  BiPAP for hypercarbia/hypoxic respiratory failure  IV levothyroxine and solu-cortef  Serial temperatures  Empiric abx, ceftriaxone and doxycycline       Garcia Sullivan MD  5:23 PM  12/01/22

## 2022-12-01 NOTE — ED PROVIDER NOTES
Department of Emergency Medicine   ED  Provider Note  Admit Date/RoomTime: 12/1/2022 11:14 AM  ED Room: 0215/0215-A              Merry Grover is a 78 y.o. male with a PMHx significant for hypothyroidism, heart failure, CKD, HTN, COPD, paroxysmal A. fib on anticoagulation, GI bleed who presents for evaluation of altered mental status, beginning at arrival.  The complaint has been persistent, moderate in severity, and worsened by nothing. The patient's daughter is at bedside helping provide additional history information. Notes that she does not live with the patient, her brother does. He has been getting more confused her last 2 to 3 days. States that today he was linger for blood transfusion and she called with no response. When she got that he was confused, had his O2 off and was hypoxic. States that he still acting confused. He does have quite a few chronic ongoing issues. He has been admitted in the past for GI bleed. The history is provided by the patient and medical records. Review of Systems   Constitutional:  Positive for fatigue. Negative for chills and fever. HENT:  Negative for ear pain, sinus pressure and sore throat. Eyes:  Negative for pain, discharge and redness. Respiratory:  Positive for shortness of breath. Negative for cough and wheezing. Cardiovascular:  Positive for leg swelling. Negative for chest pain. Gastrointestinal:  Negative for abdominal pain, diarrhea, nausea and vomiting. Genitourinary:  Negative for dysuria and frequency. Musculoskeletal:  Negative for arthralgias and back pain. Skin:  Negative for rash and wound. Neurological:  Negative for weakness and headaches. Hematological:  Negative for adenopathy. Psychiatric/Behavioral:  Positive for confusion. All other systems reviewed and are negative. Physical Exam  Vitals and nursing note reviewed. Constitutional:       General: He is in acute distress.       Appearance: He is well-developed. He is obese. He is ill-appearing. HENT:      Head: Normocephalic and atraumatic. Right Ear: External ear normal.      Left Ear: External ear normal.   Eyes:      General:         Right eye: No discharge. Left eye: No discharge. Extraocular Movements: Extraocular movements intact. Conjunctiva/sclera: Conjunctivae normal.   Cardiovascular:      Rate and Rhythm: Normal rate. Rhythm irregular. Heart sounds: Normal heart sounds. No murmur heard. Pulmonary:      Effort: Pulmonary effort is normal. No respiratory distress. Breath sounds: Rales present. Abdominal:      General: There is no distension. Palpations: Abdomen is soft. There is no mass. Tenderness: There is no abdominal tenderness. Musculoskeletal:      Cervical back: Normal range of motion and neck supple. Right lower leg: Edema present. Left lower leg: Edema present. Skin:     General: Skin is dry. Coloration: Skin is pale. Comments: Cool to touch  Discoloration to bilateral lower extremities (chronic according to family)   Neurological:      Mental Status: He is alert. He is disoriented. Procedures    MDM       ED Course as of 12/01/22 2138   u Dec 01, 2022   1314 Patient reevaluated, lying bed no acute distress [BB]   1447 Do the patient's repeat blood gas, BiPAP was started. He is currently on AVAPS tolerating well with good blood pressure. Will repeat blood gas in 30 minutes [BB]   3459 Spoke with Dr. Eugenia Lo, cardiology discussed with the patient. He states he reviewed the rhythm strips, does agree a couple episodes of nonsustained VT, however could be elevated due to the patient's elevated potassium versus CO2. He is agreeable for the patient to stay here [BB]   1631 Dr. Hurley at bedside, he will admit the patient to the ICU. [BB]   [phone] with Dr. Mack Haines, discussed repeat K.   He recommends another round of insulin, dextrose, calcium gluconate, and locelma as well as bicarb drip. 50meq bicarb in 1/2NS at 60mL/Hr [BB]   1701 EKG: This EKG is signed and interpreted by me. Rate: 75  Rhythm: Atrial fibrillation  Interpretation: Artifact throughout, no visible obvious ST elevations or depressions, , QTc 544  Comparison: changes compared to previous EKG    [BB]      ED Course User Index  [BB] DO Moy Calabrese  presents to the ED for evaluation of altered mental status. Upon arrival the patient was found to be hypoxic and hypothermic. Patient is confused, per daughter at the bedside it has been worsening over the last few days. Workup in the ED revealed patient hypotensive with a systolic BP, which improved after IVF. Denny hugger was placed for the hypothermia. Labs demonstrating hyperkalemia and JUDITH. Hyperkalemia protocol was started, nephrology was consulted as well. The patient's abg showing acidosis and hypercapenia, however he was off his supplemental oxygen. Repeat ABG was worse therefore the patient was started on AVAPS. Repeat did show improvement of both his acidosis and his hypercapnia. Due to the hypothermia, hypotension, TSH and T4 were ordered. Myxedema coma was also on the differential. While in the department the patient did have a few runs of non-sustained Vtach. Cardiology was consulted in the department. Imaging was obtained. CT head, CT C-spine negative for acute findings. CT chest demonstrating cardiomegaly with bilateral pleural effusions.  Patient requires continued workup and management of their symptoms and will be admitted to the hospital for further evaluation and treatment.      --------------------------------------------- PAST HISTORY ---------------------------------------------  Past Medical History:  has a past medical history of Abdominal aortic aneurysm (AAA) >39 mm diameter ([facility] Anemia, Cardiomyopathy ([facility] Carpal tunnel syndrome, CHF (congestive heart failure) ([facility] COPD (chronic obstructive pulmonary disease) [facility] GERD (gastroesophageal reflux disease), Hx of colonic polyps, Hypertension, Hypothyroidism, Rectal bleeding, Respiratory neoplasm, Sleep apnea, and Tobacco use. Past Surgical History:  has a past surgical history that includes Abdomen surgery; Colonoscopy; Salivary gland surgery; Cholecystectomy; Endoscopy, colon, diagnostic; Dilatation, esophagus; fracture surgery; Cardiac catheterization; joint replacement; vascular surgery; Upper gastrointestinal endoscopy (N/A, 12/9/2018); Colonoscopy (N/A, 12/9/2018); and Upper gastrointestinal endoscopy (N/A, 5/4/2022). Social History:  reports that he quit smoking about 22 years ago. He has never used smokeless tobacco. He reports current alcohol use of about 21.0 standard drinks per week. Family History: family history is not on file. The patients home medications have been reviewed.     Allergies: Apresoline [hydralazine], Cozaar [losartan], Nifedipine, Norvasc [amlodipine], Hydrochlorothiazide, Isosorbide mononitrate [isosorbide nitrate], and Prinivil [lisinopril]    -------------------------------------------------- RESULTS -------------------------------------------------    LABS:  Results for orders placed or performed during the hospital encounter of 12/01/22   RAPID INFLUENZA A/B ANTIGENS    Specimen: Nasopharyngeal   Result Value Ref Range    Influenza A by PCR Not Detected Not Detected    Influenza B by PCR Not Detected Not Detected   COVID-19, Rapid    Specimen: Nasopharyngeal Swab   Result Value Ref Range    SARS-CoV-2, NAAT Not Detected Not Detected   Lactate, Sepsis   Result Value Ref Range    Lactic Acid, Sepsis 2.3 (H) 0.5 - 1.9 mmol/L   Lactate, Sepsis   Result Value Ref Range    Lactic Acid, Sepsis 2.4 (H) 0.5 - 1.9 mmol/L   CBC with Auto Differential   Result Value Ref Range    WBC 3.5 (L) 4.5 - 11.5 E9/L    RBC 4.69 3.80 - 5.80 E12/L    Hemoglobin 11.4 (L) 12.5 - 16.5 g/dL    Hematocrit 44.0 37.0 - 54.0 %    MCV 93.8 80.0 - Negative Negative mg/dL    Bilirubin Urine SMALL (A) Negative    Ketones, Urine Negative Negative mg/dL    Specific Gravity, UA >=1.030 1.005 - 1.030    Blood, Urine Negative Negative    pH, UA 5.5 5.0 - 9.0    Protein, UA 30 (A) Negative mg/dL    Urobilinogen, Urine 0.2 <2.0 E.U./dL    Nitrite, Urine Negative Negative    Leukocyte Esterase, Urine TRACE (A) Negative    Hyaline Casts, UA 6-10 (A) 0 - 2 /LPF    WBC, UA 0-1 0 - 5 /HPF    RBC, UA NONE 0 - 2 /HPF    Bacteria, UA MODERATE (A) None Seen /HPF   Digoxin Level   Result Value Ref Range    Digoxin Lvl <0.3 (L) 0.8 - 2.0 ng/mL   Blood Gas, Arterial   Result Value Ref Range    Date Analyzed 20221201     Time Analyzed 1207     Source: Blood Arterial     pH, Blood Gas 7.149 (LL) 7.350 - 7.450    PCO2 92.8 (HH) 35.0 - 45.0 mmHg    PO2 123.3 (H) 75.0 - 100.0 mmHg    HCO3 31.5 (H) 22.0 - 26.0 mmol/L    B.E. 0.3 -3.0 - 3.0 mmol/L    O2 Sat 98.0 92.0 - 98.5 %    O2Hb 95.5 94.0 - 97.0 %    COHb 2.3 (H) 0.0 - 1.5 %    MetHb 0.3 0.0 - 1.5 %    O2 Content 16.8 mL/dL    HHb 1.9 0.0 - 5.0 %    tHb (est) 12.4 11.5 - 16.5 g/dL    Mode NC-  6L     Comment with read back     Date Of Collection      Time Collected      Pt Temp 37.0 C     ID [id number]     Lab [id number]     Critical(s) Notified Called to Dr. Amanda Belcher    Platelet Confirmation   Result Value Ref Range    Platelet Confirmation CONFIRMED    T3, Free   Result Value Ref Range    T3, Free 1.2 (L) 2.0 - 4.4 pg/mL   Basic Metabolic Panel w/ Reflex to MG   Result Value Ref Range    Sodium 134 132 - 146 mmol/L    Potassium reflex Magnesium 6.2 (H) 3.5 - 5.0 mmol/L    Chloride 95 (L) 98 - 107 mmol/L    CO2 33 (H) 22 - 29 mmol/L    Anion Gap 6 (L) 7 - 16 mmol/L    Glucose 128 (H) 74 - 99 mg/dL    BUN 52 (H) 6 - 23 mg/dL    Creatinine 3.3 (H) 0.7 - 1.2 mg/dL    Est, Glom Filt Rate 19 >=60 mL/min/1.73    Calcium 8.6 8.6 - 10.2 mg/dL   PROTEIN / CREATININE RATIO, URINE   Result Value Ref Range    Protein, Ur 35 (H) 0 - 12 mg/dL Creatinine, Ur 261 40 - 278 mg/dL    Protein/Creat Ratio 0.1 0.0 - 0.2    Protein/Creat Ratio 0.1    Creatinine, urine, random   Result Value Ref Range    Creatinine, Ur 261 40 - 278 mg/dL   URINE ELECTROLYTES   Result Value Ref Range    Sodium, Ur <20 Not Established mmol/L    Potassium, Ur 45.2 Not Established mmol/L    Chloride <20 Not Established mmol/L   Osmolality, urine   Result Value Ref Range    Osmolality, Ur 359 300 - 900 mOsm/kg   Blood Gas, Arterial   Result Value Ref Range    Date Analyzed 20221201     Time Analyzed 1428     Source: Blood Arterial     pH, Blood Gas 7.139 (LL) 7.350 - 7.450    PCO2 125.1 (HH) 35.0 - 45.0 mmHg    PO2 64.0 (L) 75.0 - 100.0 mmHg    HCO3 41.5 (H) 22.0 - 26.0 mmol/L    B.E. 8.5 (H) -3.0 - 3.0 mmol/L    O2 Sat 87.6 (L) 92.0 - 98.5 %    O2Hb 85.6 (L) 94.0 - 97.0 %    COHb 2.0 (H) 0.0 - 1.5 %    MetHb 0.3 0.0 - 1.5 %    O2 Content 14.6 mL/dL    HHb 12.1 (H) 0.0 - 5.0 %    tHb (est) 12.1 11.5 - 16.5 g/dL    Mode AFM 8L     Date Of Collection      Time Collected      Pt Temp 37.0 C     ID [id number]     Lab [id number]     Critical(s) Notified Handed report to Dr/RN    Blood Gas, Arterial   Result Value Ref Range    Date Analyzed 20221201     Time Analyzed 1529     Source: Blood Arterial     pH, Blood Gas 7.207 (LL) 7.350 - 7.450    PCO2 81.3 (HH) 35.0 - 45.0 mmHg    PO2 124.9 (H) 75.0 - 100.0 mmHg    HCO3 31.6 (H) 22.0 - 26.0 mmol/L    B.E. 1.7 -3.0 - 3.0 mmol/L    O2 Sat 98.3 92.0 - 98.5 %    PO2/FIO2 2.50 mmHg/%    O2Hb 96.3 94.0 - 97.0 %    COHb 1.7 (H) 0.0 - 1.5 %    MetHb 0.3 0.0 - 1.5 %    O2 Content 16.1 mL/dL    HHb 1.7 0.0 - 5.0 %    tHb (est) 11.7 11.5 - 16.5 g/dL    Mode AVAPS     FIO2 50.0 %    Rr Mechanical 16.0 b/min    Vt Mechanical 500.0 mL    Peep/Cpap 6.0 cmH2O    Date Of Collection      Time Collected      Pt Temp 37.0 C     ID [id number]     Lab [id number]     Critical(s) Notified Handed report to Dr/RN    Sedimentation Rate   Result Value Ref Range    Sed Rate 17 (H) 0 - 15 mm/Hr   Basic Metabolic Panel   Result Value Ref Range    Sodium 134 132 - 146 mmol/L    Potassium 5.4 (H) 3.5 - 5.0 mmol/L    Chloride 94 (L) 98 - 107 mmol/L    CO2 30 (H) 22 - 29 mmol/L    Anion Gap 10 7 - 16 mmol/L    BUN 55 (H) 6 - 23 mg/dL    Creatinine 3.5 (H) 0.7 - 1.2 mg/dL    Est, Glom Filt Rate 17 >=60 mL/min/1.73    Calcium 8.6 8.6 - 10.2 mg/dL   Magnesium   Result Value Ref Range    Magnesium 3.0 (H) 1.6 - 2.6 mg/dL   Phosphorus   Result Value Ref Range    Phosphorus 6.8 (H) 2.5 - 4.5 mg/dL   EKG 12 Lead   Result Value Ref Range    Ventricular Rate 75 BPM    Atrial Rate 88 BPM    QRS Duration 134 ms    Q-T Interval 488 ms    QTc Calculation (Bazett) 544 ms    R Axis 121 degrees    T Axis -82 degrees   TYPE AND SCREEN   Result Value Ref Range    ABO/Rh O POS     Antibody Screen NEG        RADIOLOGY:  CT CHEST WO CONTRAST   Final Result   Cardiomegaly and bilateral pleural effusions more prominent on the right. Mild stranding of the peritoneal fat planes is subtle free fluid visualized   in the paracolic gutters. CT ABDOMEN PELVIS WO CONTRAST Additional Contrast? None   Final Result   Cardiomegaly and bilateral pleural effusions more prominent on the right. Mild stranding of the peritoneal fat planes is subtle free fluid visualized   in the paracolic gutters. XR CHEST PORTABLE   Final Result   Cardiomegaly with moderate interstitial pulmonary edema. CT to follow. CT Head W/O Contrast   Final Result   No acute intracranial abnormality. Chronic microvascular disease, no evidence of acute territorial infarct is   seen. ------------------------- NURSING NOTES AND VITALS REVIEWED ---------------------------  Date / Time Roomed:  12/1/2022 11:14 AM  ED Bed Assignment:  [id number]    The nursing notes within the ED encounter and vital signs as below have been reviewed.      Patient Vitals for the past 24 hrs:   BP Temp Temp src Pulse Resp SpO2 Height Weight   12/01/22 2003 -- -- -- -- 19 -- -- --   12/01/22 2000 -- (!) 90.1 °F (32.3 °C) Bladder -- -- -- -- --   12/01/22 1900 (!) 144/96 -- -- 71 28 95 % -- --   12/01/22 1810 (!) 144/96 (!) 89.2 °F (31.8 °C) Bladder 72 17 94 % -- 276 lb 14.4 oz (125.6 kg)   12/01/22 1718 89/64 (!) 88.5 °F (31.4 °C) Bladder 72 20 95 % -- --   12/01/22 1636 98/81 (!) 88 °F (31.1 °C) Bladder 66 20 95 % -- --   12/01/22 1618 -- -- -- -- 23 -- -- --   12/01/22 1532 114/65 (!) 87.4 °F (30.8 °C) Bladder 75 16 99 % -- --   12/01/22 1507 -- (!) 87.4 °F (30.8 °C) Bladder -- -- -- -- --   12/01/22 1448 112/83 (!) 87.3 °F (30.7 °C) Bladder 75 18 99 % -- --   12/01/22 1445 -- -- -- -- 22 -- -- --   12/01/22 1404 -- -- -- 63 25 98 % -- --   12/01/22 1403 -- -- -- 67 18 97 % -- --   12/01/22 1402 -- -- -- 75 25 97 % -- --   12/01/22 1401 -- -- -- 64 14 97 % -- --   12/01/22 1400 -- -- -- 72 11 96 % -- --   12/01/22 1318 95/77 (!) 87.3 °F (30.7 °C) Bladder 67 13 97 % 6' (1.829 m) --   12/01/22 1241 92/72 -- -- 69 24 96 % -- --   12/01/22 1222 -- (!) 85.5 °F (29.7 °C) Bladder -- -- -- -- --   12/01/22 1138 -- -- -- 77 -- 98 % 6' (1.829 m) --       Oxygen Saturation Interpretation: Abnormal - but at baseline and Improved after treatment    ------------------------------------------ PROGRESS NOTES ------------------------------------------  Counseling:  I have spoken with the patient and daughter  and discussed todays results, in addition to providing specific details for the plan of care and counseling regarding the diagnosis and prognosis. Their questions are answered at this time and they are agreeable with the plan of admission.    --------------------------------- ADDITIONAL PROVIDER NOTES ---------------------------------  Consultations:  Spoke with Dr. Mary Alice Ralph. Discussed case. They will admit the patient. Spoke with Dr. Álvaro Dumont, he will provide consult and accept the patient to the ICU. Spoke with Dr. Bethany Mabry, nephrology.  He will provide consult. Spoke with Dr. Eugenia Lo, Cardiology, he will provide consult. This patient's ED course included: a personal history and physicial examination, re-evaluation prior to disposition, multiple bedside re-evaluations, IV medications, cardiac monitoring, continuous pulse oximetry, and complex medical decision making and emergency management    This patient has been closely monitored and initially deteriorated, but then stabilized during their ED course. Please note that the withdrawal or failure to initiate urgent interventions for this patient would likely result in a life threatening deterioration or permanent disability. Accordingly this patient received 42 minutes of critical care time, excluding separately billable procedures. Diagnosis:  1. Altered mental status, unspecified altered mental status type    2. Hypothermia, initial encounter    3. Hyperkalemia    4. Acute kidney injury ([address]    5. Bilateral pleural effusion    6. Acute respiratory failure with hypercapnia (HCC)    7.  Hypothyroidism, unspecified type        Disposition:  Patient's disposition: Admit to telemetry  Patient's condition is critical.         Julieth Hernandez DO  12/01/22 2142

## 2022-12-01 NOTE — H&P
[facility] History and Physical      CHIEF COMPLAINT: Confusion    History of Present Illness:    79-year-old male with past medical history of chronic respiratory acidosis, CKD stage III, hypothyroidism, paroxysmal atrial fibrillation, hypertension, and history of GI bleed presents with change in mentation. I was unable to get most of the history from the patient however daughter was present at bedside. She states that her dad lives with her brother. She states that she did not hear from him in a couple of days and so she went over to visit. He was laying at his house without any oxygen on. Patient appeared confused. They put the oxygen back on although patient still was confused. Patient normally wears 4 L of oxygen at home. He has a BiPAP as well. At this point in time they called EMS. In the ER patient was found to be severely hyperkalemic as well as hypothermic. Denny hugger was placed. Patient was given 2 L of normal saline bolus, calcium gluconate, Lasix, regular insulin. Repeat potassium was still elevated. Nephrology was consulted and decision was made to patient to go to the ICU with insulin drip as well as Lokelma. I asked the daughter if the patient takes medications by himself and she states yes. Unknown if he has been compliant with his medication. She has brought pill bottles.     Informant(s) for H&P: EMR, daughter    REVIEW OF SYSTEMS:  A comprehensive review of systems was negative except for: what is in the HPI      PMH:  Past Medical History:   Diagnosis Date    Abdominal aortic aneurysm (AAA) >39 mm diameter (HCC)     Anemia     Cardiomyopathy (HCC)     Carpal tunnel syndrome     CHF (congestive heart failure) ([facility] [address]     echo 3/5/16 ef 37%, stage 1 diastolic    COPD (chronic obstructive pulmonary disease) (HCC)     GERD (gastroesophageal reflux disease)     Hx of colonic polyps     Hypertension     Hypothyroidism     Rectal bleeding     Respiratory neoplasm Sleep apnea     Tobacco use        Surgical History:  Past Surgical History:   Procedure Laterality Date    ABDOMINAL SURGERY      CARDIAC CATHETERIZATION      CHOLECYSTECTOMY      COLONOSCOPY      COLONOSCOPY N/A 12/9/2018    COLONOSCOPY DIAGNOSTIC performed by Rubén Salinas MD at [facility], ESOPHAGUS      ENDOSCOPY, COLON, DIAGNOSTIC      FRACTURE SURGERY      JOINT REPLACEMENT      SALIVARY GLAND SURGERY      UPPER GASTROINTESTINAL ENDOSCOPY N/A 12/9/2018    EGD DIAGNOSTIC ONLY performed by Rubén Salinas MD at [facility] 5/4/2022    EGD BIOPSY performed by Avery Hannon MD at [facility]         Medications Prior to Admission:    Prior to Admission medications    Medication Sig Start Date End Date Taking?  Authorizing Provider   digoxin (LANOXIN) 125 MCG tablet Take 1 tablet by mouth daily 5/12/22   Arjun Zimmerman MD   pantoprazole (PROTONIX) 40 MG tablet Take 1 tablet by mouth 2 times daily (before meals) 5/11/22   Arjun Zimmerman MD   bumetanide (BUMEX) 2 MG tablet Take 1 tablet by mouth 2 times daily 5/11/22   Arjun Zimmerman MD   fluticasone (FLONASE) 50 MCG/ACT nasal spray 2 sprays by Nasal route daily    Historical Provider, MD   levothyroxine (SYNTHROID) 50 MCG tablet Take 50 mcg by mouth Daily Given with Levothyroxine 200 mcg total dose Levothyroxine 250 mcg    Historical Provider, MD   levothyroxine (SYNTHROID) 200 MCG tablet Take 200 mcg by mouth Daily Given with Levothyroxine 50 mcg total dose Levothyroxine 250 mcg    Historical Provider, MD   metoprolol succinate (TOPROL XL) 50 MG extended release tablet Take 50 mg by mouth daily    Historical Provider, MD   ketotifen (ZADITOR) 0.025 % ophthalmic solution Place 1 drop into both eyes 2 times daily as needed    Historical Provider, MD   albuterol sulfate HFA (VENTOLIN HFA) 108 (90 Base) MCG/ACT inhaler Inhale 1 puff into the lungs every 6 hours as needed for Wheezing    Historical Provider, MD   terazosin (HYTRIN) 2 MG capsule Take 4 mg by mouth nightly    Historical Provider, MD   Budeson-Glycopyrrol-Formoterol (BREZTRI AEROSPHERE) 160-9-4.8 MCG/ACT AERO Inhale 2 puffs into the lungs 2 times daily    Historical Provider, MD   albuterol (PROVENTIL) (2.5 MG/3ML) 0.083% nebulizer solution Take 2.5 mg by nebulization every 6 hours as needed for Wheezing    Historical Provider, MD   sodium chloride (OCEAN) 0.65 % nasal spray 1 spray by Nasal route daily    Historical Provider, MD   OXYGEN Inhale 4 L into the lungs continuous    Historical Provider, MD   rivaroxaban (XARELTO) 15 MG TABS tablet Take 15 mg by mouth Daily with supper     Historical Provider, MD   vitamin D 25 MCG (1000 UT) CAPS Take 1,000 Units by mouth daily     Historical Provider, MD   sucralfate (CARAFATE) 1 GM tablet Take 1 tablet by mouth three times daily 12/13/18 1/12/19  Mehrdad Bejarano MD       Allergies:    Apresoline [hydralazine], Cozaar [losartan], Nifedipine, Norvasc [amlodipine], Hydrochlorothiazide, Isosorbide mononitrate [isosorbide nitrate], and Prinivil [lisinopril]    Social History:    reports that he quit smoking about 22 years ago. He has never used smokeless tobacco. He reports current alcohol use of about 21.0 standard drinks per week. Family History:   family history is not on file. PHYSICAL EXAM:  Vitals:  BP 98/81   Pulse 66   Temp (!) 88 °F (31.1 °C) (Bladder)   Resp 20   Ht 6' (1.829 m)   SpO2 95%   BMI 38.82 kg/m²     General Appearance: alert, unable to assess orientation as he did not respond to my questions.     Skin: warm and dry  Head: normocephalic and atraumatic  Eyes: pupils equal, round, and reactive to light, extraocular eye movements intact, conjunctivae normal  Neck: neck supple and non tender without mass   Pulmonary/Chest: clear to auscultation bilaterally- no wheezes, rales or rhonchi, normal air movement, no respiratory distress  Cardiovascular: normal rate, normal S1 and S2 and no carotid bruits  Abdomen: soft, non-tender, non-distended, normal bowel sounds, no masses or organomegaly  Extremities: no cyanosis, no clubbing and no edema  Neurologic: Unable to assess        LABS:  Recent Labs     12/01/22  1158 12/01/22  1438    134   K 6.5* 6.2*   CL 94* 95*   CO2 34* 33*   BUN 54* 52*   CREATININE 3.4* 3.3*   GLUCOSE 86 128*   CALCIUM 8.9 8.6       Recent Labs     12/01/22  1158   WBC 3.5*   RBC 4.69   HGB 11.4*   HCT 44.0   MCV 93.8   MCH 24.3*   MCHC 25.9*   RDW 28.6*   PLT 95*   MPV NOT CALC       No results for input(s): POCGLU in the last 72 hours. Radiology:   CT CHEST WO CONTRAST   Final Result   Cardiomegaly and bilateral pleural effusions more prominent on the right. Mild stranding of the peritoneal fat planes is subtle free fluid visualized   in the paracolic gutters. CT ABDOMEN PELVIS WO CONTRAST Additional Contrast? None   Final Result   Cardiomegaly and bilateral pleural effusions more prominent on the right. Mild stranding of the peritoneal fat planes is subtle free fluid visualized   in the paracolic gutters. XR CHEST PORTABLE   Final Result   Cardiomegaly with moderate interstitial pulmonary edema. CT to follow. CT Head W/O Contrast   Final Result   No acute intracranial abnormality. Chronic microvascular disease, no evidence of acute territorial infarct is   seen. ASSESSMENT:      Active Problems:    * No active hospital problems. *  Resolved Problems:    * No resolved hospital problems. *      PLAN:    Intractable hyperkalemia - Received 2 L NS bolus, albuterol, calcium gluconate 2 g IV, and regular insulin and still did not respond. Plan is to treat with insulin drip as well as lokelma. Chronic respiratory acidosis with secondary metabolic acidosis  Non oliguric JUDITH on CKD III - Baseline renal functions appear to be around 1.6- 1.8.   B/L pleural effusion, R> L - Agree with lasix 40 mg IV daily which will also lower potassium levels. Hypothyroidism - Will resume home synthroid dose. Unsure if there is compliance issue. SIRS - Hypothermia as well as leukopenia. UA, CXR, CT abd/pelvis/chest are unremarkable. BCx are pending. Will order procalcitonin as well as full viral resp panel. Although negative for influenza, covid. Defer to intensivist to start empiric antibiotics  Lactic acidosis  Hyperammoniumemia - Lactulose ordered and will check ammonia levels daily. Once ammonia is wnl then can DC lactulose  NSVT - Cardiology on board. Will check digoxin level. PAF - Hold digoxin. Check digoxin levels due to hyperkalemia. Continue xarelto  HTN - Continue toprol  H/O GIB - Continue protonix and carafate  DVT prophylaxis - Xarelto    NOTE: This report was transcribed using voice recognition software. Every effort was made to ensure accuracy; however, inadvertent computerized transcription errors may be present.   Electronically signed by Reed Castro DO on 12/1/2022 at 4:51 PM

## 2022-12-01 NOTE — PROGRESS NOTES
Database initiated. Patient is alert to self only at the moment. Daughter and wife answered questions. He normally ambulates with a walker but has a wheelchair if needed. He wears 2.5-3 liters oxygen at baseline. Pharmacy tech to verify home medications.

## 2022-12-02 NOTE — PLAN OF CARE
Problem: ABCDS Injury Assessment  Goal: Absence of physical injury  Outcome: Progressing     Problem: Discharge Planning  Goal: Discharge to home or other facility with appropriate resources  Outcome: Progressing     Problem: Safety - Medical Restraint  Goal: Remains free of injury from restraints (Restraint for Interference with Medical Device)  Description: INTERVENTIONS:  1. Determine that other, less restrictive measures have been tried or would not be effective before applying the restraint  2. Evaluate the patient's condition at the time of restraint application  3. Inform patient/family regarding the reason for restraint  4.  Q2H: Monitor safety, psychosocial status, comfort, nutrition and hydration  Outcome: Progressing  Flowsheets  Taken 12/2/2022 0000 by Inge Mccain RN  Remains free of injury from restraints (restraint for interference with medical device): Every 2 hours: Monitor safety, psychosocial status, comfort, nutrition and hydration  Taken 12/1/2022 2200 by Inge Mccain RN  Remains free of injury from restraints (restraint for interference with medical device): Every 2 hours: Monitor safety, psychosocial status, comfort, nutrition and hydration  Taken 12/1/2022 2134 by Mt Abreu RN  Remains free of injury from restraints (restraint for interference with medical device): Every 2 hours: Monitor safety, psychosocial status, comfort, nutrition and hydration     Problem: Pain  Goal: Verbalizes/displays adequate comfort level or baseline comfort level  Outcome: Progressing

## 2022-12-02 NOTE — PROCEDURES
12/02/2022  0305    Arterial Line Placement Procedure Note                     Indication: severe hypotension    Consent: The patients daughter counseled regarding the procedure via phone (confirmed by third party present), its indications, risks, potential complications and alternatives, and any questions were answered. Consent was obtained to proceed. Procedure: The skin over the right radial artery was prepped with Chloraprep and draped in a sterile fashion. Local anesthesia was obtained by infiltration using 0.5 cc of 1% Lidocaine without epinephrine. A 20 gauge arterial line catheter was then inserted under ultrasound guidance, using a modified Seldinger technique, into the vessel. The transducer set was then attached and securely fastened to the skin with sutures. Waveforms on the monitor were observed and found to be adequate. The patient had good distal perfusion after the procedure. The site was then dressed in a sterile fashion. The patient tolerated the procedure well. Complications: None       Central Line Placement Procedure Note    12/02/2022  0350    Indication: centrally administered medications    Consent: The patients daughter was counseled regarding the procedure via phone (confirmed by third party present), its indications, risks, potential complications and alternatives, and any questions were answered. Consent was obtained to proceed. Procedure: The patient was positioned appropriately and the skin over the left internal jugular vein was prepped with Chloraprep and draped in a sterile fashion. Local anesthesia was obtained by infiltration using 1.0 cc of 1% Lidocaine without epinephrine. A large bore needle was inserted under ultrasound guidance A guide wire was then inserted into the vein through the needle. A triple lumen catheter was then inserted into the vessel over the guide wire using the Seldinger technique.   All ports showed good, free flowing blood return and were flushed with saline solution. The catheter was then securely fastened to the skin with sutures and covered with a sterile dressing. A post procedure X-ray was ordered and line was advanced, post advancement X-ray has been ordered but pending at this time. The patient tolerated the procedure well. Complications: None      PROCEDURE  12/2/22       0405    INTUBATION  Risks, benefits and alternatives if able (for applicable procedures below) described. Performed By: VALERIA Choi CNP. Indication:  comatose state. Informed consent: Verbal consent obtained. The patients daughter was counseled regarding the procedure via phone (confirmed by third party present), it's indications, risks, potential complications and alternatives and any questions were answered. Verbal consent was obtained. Procedure: Following Preoxygenation the patient was pretreated with 50 mcg of fentanyl and 10 mg of etomidate. Intubation was performed after single attempt(s) by indirect laryngoscopy using a glide scope and 7.5mm cuffed endotracheal tube was inserted . Initial post procedure placement:  confirmed by bilateral breath sounds, an end tidal CO2 detector, absence of sounds over the stomach, tube fogging. Tube Secured @ 25cm at the Lip. Post procedure chest x-ray: has been ordered but is still pending. Procedural Complications: None. Anesthesia Consult:  No       Attending Physician Attestation: Dr. Sera Orellana    Thank you very much for allowing me to see this patient in consultation and follow up. I personally saw, examined and provided care for the patient. Radiographs, labs and medication list were reviewed by me independently. I spoke with bedside nursing, respiratory therapists and consultants. Critical care services and times documented are independent of procedures and multidisciplinary rounds with Residents.  Additionally comprehensive, multidisciplinary rounds were conducted with the MICU dmitriy Hawkins was completed in AM.  I was not physically present during the completion of procedure. RACHID De Anda   8:41 AM

## 2022-12-02 NOTE — PROGRESS NOTES
Date: 12/1/2022    Time: 10:09 PM    Patient Placed On BIPAP/CPAP/ Non-Invasive Ventilation? Pt remains on at this time    If no must comment. Facial area red/color change? No           If YES are Blister/Lesion present? No   If yes must notify nursing staff  BIPAP/CPAP skin barrier? Yes    Skin barrier type:mepilexlite       Comments: Pt remains on AVAPS at this time, mepilex in place. Machine plugged into red outlet and outside alarm plugged in.         Marlen Bernal RCP    12/01/22 2202   NIV Type   Mode AVAPS   Mask Type Full face mask   Mask Size Large   Settings/Measurements   PIP Observed 25 cm H20   CPAP/EPAP 6 cmH2O   IPAP Min 20 cmH2O   IPAP Max 30 cmH2O   Vt (Set, mL) 500 mL   Vt (Measured) 503 mL   Rate Ordered 16   Resp 19   FiO2  40 %   Minute Volume (L/min) 10.3 Liters   Mask Leak (lpm) 25 lpm   Comfort Level Good   Using Accessory Muscles No   SpO2 93

## 2022-12-02 NOTE — PROGRESS NOTES
Spoke with Dr. Mary Pérez oral Synthroid to IV Synthroid 200 mcg one-time dose  Hold off on Bumex drip as patient SPO2 sats greater than 90% on 40% FiO2  Hold off on Levophed can initiated for MAP < 65  Discontinue bicarb drip  Hold Xarelto for possible thoracentesis tomorrow. Switch oral Doxy to IV for now until tolerating p.o.     Electronically signed by VALERIA Mayfield CNP on 12/1/2022 at 9:39 PM

## 2022-12-02 NOTE — PROGRESS NOTES
Nutrition Note    Pt's family have decided on compassionate extubation. Comfort Care measures.  Dietitian available per consult      Electronically signed by Maday Cabrera RD, CNSC, LD on 12/2/22 at 11:20 AM EST    Contact: [phone]

## 2022-12-02 NOTE — PROGRESS NOTES
Patient admitted from ER 02 to 215, with the following belongings cell phone and bag of various clothing items, placed on monitor, patient oriented to room and unit visiting hours. Patient guide at bedside, reviewed patient rights and responsibilities. MRSA nasal swab obtained. Bed alarm on. Call light within reach. Daughter, Ctilali Pritchard, took all valuables home including all home medications, both hearing aides, dentures, and silver necklace. Upon initial assessment, patient has large, weeping leg wounds to the bilateral lower extremities. Cleansed with normal saline, and dressed with adaptic and Kerlix. Small ulcer noted on coccyx. Areas of excoriation noted under right breast and in the desean area. Wound care consulted. All pertinent information and assessment findings provided to night shift nurse Cesia Acosta RN. Patient resting in bed with call light within reach, nurse at bedside.

## 2022-12-02 NOTE — PLAN OF CARE
Problem: Respiratory - Adult  Goal: Achieves optimal ventilation and oxygenation  Outcome: Not Progressing  Flowsheets (Taken 12/2/2022 7265)  Achieves optimal ventilation and oxygenation: Assess for changes in respiratory status  Note: Pt intubated for airway protection

## 2022-12-02 NOTE — PROGRESS NOTES
Dr. Marianna Bagley and Gee Maravilla NP at bedside updating family on patient condition. Family expressing that patient would not want further aggressive treatment, including dialysis, and would like to make patient comfortable. Support provide to family. Orders received.

## 2022-12-02 NOTE — PROGRESS NOTES
DAILY VENTILATOR WEANING ASSESSMENT PERFORMED    P/FIO2 Ratio = 1.06         (<100= do not Wean)                  Cs =   25                       (<32= Instability)  Plat. Pressure = 25  MV =11.5  RSBI = unable to be obtained    Instabilities:       Cardiovascular =3       CNS =       Respiratory =       Metabolic =    Parameters    no    Wean per protocol  no    Ask Physician for a weaning plan no    Additional Comments:     Performed by Breanna Loving RRT      Reference Table:    Cardiovascular     CNS      1. Mean BP less than or equal to 75   1. Neuromuscular blockade  2. Heart Rate greater than 130   2. RASS of -3, -4, -5  3. Myocardial Ischemia    3. RASS of +3, +4  4. Mechanical Assist Device    4. ICP greater than 15 or             Intracranial Hypertension         Respiratory      Metabolic  1. PEEP equal to or greater than 10cm/H20  1. Temp. (8hrs) less than 95 or > 103  2. Respiratory Rate greater than 35   2. WBC < 5000 or > 33951  3. Minute Volume greater than 15L  4. pH less than 7.30  5.  Deteriorating chest X-ray

## 2022-12-02 NOTE — PROGRESS NOTES
Critical Care Admit/Consult Note           Patient - Toribio Alexandra Sr. MRN -  [id number]   [facility]   - 1945      Date of Admission -  2022 11:14 AM  Date of evaluation -  2022   Hospital Day - 1                 ADMIT/CONSULT DETAILS      Reason for Admit/Consult   Hypothermia      Consulting Service/Physician   Consulting - [facility]  Primary Care Physician - Gabi Weems      ICU attending - Dr. Roger MCGREGOR   The patient is a 78 y.o. male with significant past medical history of hypothyroidism, HFpEF EF 63%, CKD, HTN, AAA, COPD on home O2, sleep apnea, A.fib on Xarelto, GERD, GI bleed presents with Altered Mental Status (Patient was at home and not wearing oxygen (normally wears 3L NC)) and Extremity Weakness (Increased weakness)     He is a 51-year-old male with a history of hypothyroidism, HFpEF, CKD, HTN, AAA, COPD on 3L home O2, sleep apnea, A.fib on Xarelto, GERD, GI bleed presenting to the ED for altered mental status. Per the patient's daughter, the patient has been getting more confused for the last 2 to 3 days. She visited him today and noticed he had his home O2 off and was hypoxic. He was brought to the ED where he was found to be hypothermic with a core temperature of 88 Fahrenheit. He was also found to be hypotensive to 98/81. He was given 1 bolus of normal saline with some improvement. Potassium 6.5. He was given 2L NS bolus, albuterol, calcium gluconate, and regular insulin. BUN/CR 54/3.4, BNP 2967 however improved from baseline, troponin 60 consistent with baseline, ammonia slightly elevated to 60.4, TSH elevated to 34.36 with free T4 decreased to 0.82 and free T3 decreased to 1.2. Hb 11.4. WBC 3.5. Platelets 95.  UA not concerning for infection. ABG shows respiratory acidosis 7.149/92.8/123.3/31.5. He was placed on AVAPS. CT head shows no acute intracranial abnormality.   CT chest shows bilateral pleural effusions more prominent on the right. CT abdomen shows mild stranding of the peritoneal fat planes and subtle free fluid visualized in the paracolic gutters.        12/1: intubation due to altered mental status     12/2: family opted for no HD and now [facility]      Past Medical History      Past Medical History             Diagnosis Date    Abdominal aortic aneurysm (AAA) >39 mm diameter (HCC)      Anemia      Cardiomyopathy ([address]      Carpal tunnel syndrome      CHF (congestive heart failure) ([address]       echo 3/5/16 ef 41%, stage 1 diastolic    COPD (chronic obstructive pulmonary disease) (HCC)      GERD (gastroesophageal reflux disease)      Hx of colonic polyps      Hypertension      Hypothyroidism      Rectal bleeding      Respiratory neoplasm      Sleep apnea      Tobacco use                         Past Surgical History        Past Surgical History             Procedure Laterality Date    ABDOMINAL SURGERY        CARDIAC CATHETERIZATION        CHOLECYSTECTOMY        COLONOSCOPY        COLONOSCOPY N/A 12/9/2018     COLONOSCOPY DIAGNOSTIC performed by Jackelyn Faria MD at [address] [place] [address], ESOPHAGUS        ENDOSCOPY, COLON, DIAGNOSTIC        FRACTURE SURGERY        JOINT REPLACEMENT        SALIVARY GLAND SURGERY        UPPER GASTROINTESTINAL ENDOSCOPY N/A 12/9/2018     EGD DIAGNOSTIC ONLY performed by Jackelyn Faria MD at [place] [address] 5/4/2022     EGD BIOPSY performed by Vikki Vo MD at [address] [place] [address]:    Social History         Tobacco History         Smoking Status  Former Quit Date  1/1/2000 Smoking Tobacco Type  Cigarettes quit in 1/1/2000        Smokeless Tobacco Use  Never                          Current Medications   Current Medications   Scheduled Medications    furosemide  40 mg IntraVENous Daily    Budeson-Glycopyrrol-Formoterol  2 puff Inhalation BID    levothyroxine  200 mcg Oral Daily metoprolol succinate  50 mg Oral Daily    pantoprazole  40 mg Oral BID AC    rivaroxaban  15 mg Oral Dinner    sucralfate  1 g Oral TID    sodium chloride flush  5-40 mL IntraVENous 2 times per day         PRN Medications   glucose, dextrose bolus **OR** dextrose bolus, glucagon (rDNA), dextrose, sodium chloride flush, sodium chloride, ondansetron **OR** ondansetron, polyethylene glycol, acetaminophen **OR** acetaminophen     IV Drips/Infusions  Infusions Meds    dextrose      sodium chloride           Home Medications  Prescriptions Prior to Admission   Not in a hospital admission. Diet/Nutrition   ADULT DIET; Regular; Low Potassium (Less than 3000 mg/day)     Allergies   Apresoline [hydralazine], Cozaar [losartan], Nifedipine, Norvasc [amlodipine], Hydrochlorothiazide, Isosorbide mononitrate [isosorbide nitrate], and Prinivil [lisinopril]     Social History   Tobacco   reports that he quit smoking about 22 years ago. He has never used smokeless tobacco.     Alcohol     reports current alcohol use of about 21.0 standard drinks per week. Occupational history/exposure? Family History   Family History   No family history on file. ROS   Review of Systems   Constitutional:  Negative for chills and fever. HENT:  Negative for ear pain and rhinorrhea. Eyes:  Negative for pain. Respiratory:  Negative for cough and shortness of breath. Cardiovascular:  Negative for chest pain and palpitations. Gastrointestinal:  Negative for abdominal pain, constipation, diarrhea, nausea and vomiting. Genitourinary:  Negative for difficulty urinating and dysuria. Musculoskeletal:  Negative for arthralgias and myalgias. Skin:  Negative for rash. Neurological:  Negative for dizziness and headaches. Psychiatric/Behavioral:  Positive for confusion. All other systems reviewed and are negative.      Mechanical Ventilation Data   VENT SETTINGS (Comprehensive)  Additional Respiratory Assessments  Heart Rate: 66  Resp: 20  SpO2: 95 %     ABG        Lab Results   Component Value Date/Time     PH 7.207 2022 03:29 PM     PCO2 81.3 2022 03:29 PM     PO2 124.9 2022 03:29 PM     HCO3 31.6 2022 03:29 PM     O2SAT 98.3 2022 03:29 PM            Lab Results   Component Value Date/Time     MODE AVAPS 2022 03:29 PM         Vitals    height is 6' (1.829 m). His bladder temperature is 88 °F (31.1 °C) (abnormal). His blood pressure is 98/81 and his pulse is 66. His respiration is 20 and oxygen saturation is 95%.         Temperature Range: Temp: (!) 88 °F (31.1 °C) Temp  Av.2 °F (30.7 °C)  Min: 85.5 °F (29.7 °C)  Max: 88 °F (31.1 °C)  BP Range:  Systolic (65OCS), ATR:425 , Min:92 , YDU:926     Diastolic (06LBF), QP, Min:65, Max:83     Pulse Range: Pulse  Av  Min: 63  Max: 77  Respiration Range: Resp  Av.1  Min: 11  Max: 25  Current Pulse Ox[de-identified]  SpO2: 95 %  24HR Pulse Ox Range:  SpO2  Av.2 %  Min: 95 %  Max: 99 %  Oxygen Amount and Delivery: O2 Flow Rate (L/min): 6 L/min     I/O (24 Hours)  Patient Vitals for the past 8 hrs:    BP Temp Temp src Pulse Resp SpO2 Height   22 1636 98/81 (!) 88 °F (31.1 °C) Bladder 66 20 95 % --   22 1618 -- -- -- -- 23 -- --   22 1532 114/65 (!) 87.4 °F (30.8 °C) Bladder 75 16 99 % --   22 1507 -- (!) 87.4 °F (30.8 °C) Bladder -- -- -- --   22 1448 112/83 (!) 87.3 °F (30.7 °C) Bladder 75 18 99 % --   22 1445 -- -- -- -- 22 -- --   22 1404 -- -- -- 63 25 98 % --   22 1403 -- -- -- 67 18 97 % --   22 1402 -- -- -- 75 25 97 % --   22 1401 -- -- -- 64 14 97 % --   22 1400 -- -- -- 72 11 96 % --   22 1318 95/77 (!) 87.3 °F (30.7 °C) Bladder 67 13 97 % 6' (1.829 m)   22 1241 92/72 -- -- 69 24 96 % --   22 1222 -- (!) 85.5 °F (29.7 °C) Bladder -- -- -- --   22 1138 -- -- -- 77 -- 98 % 6' (1.829 m)         Intake/Output Summary (Last 24 hours) at 2022 [facility] filed at 12/1/2022 1452      Gross per 24 hour   Intake 150 ml   Output --   Net 150 ml      No intake/output data recorded. Date 12/01/22 0000 - 12/01/22 2359   Shift 1027-2919 [id number] 0329-9212 24 Hour Total   INTAKE   IV Piggyback   150   150   Shift Total   150   150   OUTPUT   Shift Total           Weight (kg)              No data found. Exam   Physical Exam  Constitutional:       Appearance: Normal appearance. Intubated   HENT:      Head: Normocephalic and atraumatic. Nose: Nose normal.   Eyes:      Extraocular Movements: Extraocular movements intact. Pupils: Pupils are equal, round, and reactive to light. Cardiovascular:      Rate and Rhythm: Normal rate and regular rhythm. Pulmonary:      Comments: diminished breath sounds at lung bases, no focal wheezes ntoed  Abdominal:      General: Abdomen is flat. Palpations: Abdomen is soft. Tenderness: There is no abdominal tenderness. Musculoskeletal:         General: Normal range of motion. Cervical back: Normal range of motion. Right lower leg: Edema present. Left lower leg: Edema present. Skin:     General: Skin is warm and dry. Comments: Dry skin to bilateral legs that is weeping, that have been wrapped    Neurological:      General: No focal deficit present. Mental Status: He is alert.    Psychiatric:         Behavior: Behavior normal.         Data   Old records and images have been reviewed     Lab Results   CBC           Lab Results   Component Value Date/Time     WBC 3.5 12/01/2022 11:58 AM     RBC 4.69 12/01/2022 11:58 AM     HGB 11.4 12/01/2022 11:58 AM     HCT 44.0 12/01/2022 11:58 AM     PLT 95 12/01/2022 11:58 AM     MCV 93.8 12/01/2022 11:58 AM     MCH 24.3 12/01/2022 11:58 AM     MCHC 25.9 12/01/2022 11:58 AM     RDW 28.6 12/01/2022 11:58 AM     NRBC 3.0 12/01/2022 11:58 AM     METASPCT 0.9 06/19/2020 02:20 AM     LYMPHOPCT 6.0 12/01/2022 11:58 AM     MONOPCT 7.0 12/01/2022 11:58 AM     BASOPCT 0.0 12/01/2022 11:58 AM     MONOSABS 0.24 12/01/2022 11:58 AM     LYMPHSABS 0.21 12/01/2022 11:58 AM     EOSABS 0.00 12/01/2022 11:58 AM     BASOSABS 0.00 12/01/2022 11:58 AM         BMP         Lab Results   Component Value Date/Time      12/01/2022 02:38 PM     K 6.2 12/01/2022 02:38 PM     K 6.5 12/01/2022 11:58 AM     CL 95 12/01/2022 02:38 PM     CO2 33 12/01/2022 02:38 PM     BUN 52 12/01/2022 02:38 PM     CREATININE 3.3 12/01/2022 02:38 PM     GLUCOSE 128 12/01/2022 02:38 PM     CALCIUM 8.6 12/01/2022 02:38 PM         LFTS        Lab Results   Component Value Date/Time     ALKPHOS 119 12/01/2022 11:58 AM     ALT 17 12/01/2022 11:58 AM     AST 20 12/01/2022 11:58 AM     PROT 7.4 12/01/2022 11:58 AM     BILITOT 0.6 12/01/2022 11:58 AM     LABALBU 3.3 12/01/2022 11:58 AM         INR      Recent Labs     12/01/22  1158   PROTIME 16.4*   INR 1.4         APTT  No results for input(s): APTT in the last 72 hours. Lactic Acid        Lab Results   Component Value Date/Time     LACTA 2.1 12/01/2022 11:58 AM     LACTA 1.2 12/07/2018 09:00 AM         BNP   No results for input(s): BNP in the last 72 hours. Cultures      No results for input(s): BC in the last 72 hours. No results for input(s): Laurence Coleman in the last 72 hours. No results for input(s): LABURIN in the last 72 hours. Radiology      CT CHEST WO CONTRAST   Final Result   Cardiomegaly and bilateral pleural effusions more prominent on the right. Mild stranding of the peritoneal fat planes is subtle free fluid visualized   in the paracolic gutters. CT ABDOMEN PELVIS WO CONTRAST Additional Contrast? None   Final Result   Cardiomegaly and bilateral pleural effusions more prominent on the right. Mild stranding of the peritoneal fat planes is subtle free fluid visualized   in the paracolic gutters.            XR CHEST PORTABLE   Final Result   Cardiomegaly with moderate interstitial pulmonary edema.  CT to follow. CT Head W/O Contrast   Final Result   No acute intracranial abnormality. Chronic microvascular disease, no evidence of acute territorial infarct is   seen. SYSTEMS ASSESSMENT     Neuro  #Altered mental status and hypothermia d/t myxedema coma  - IV levothyroxine   - IV solu-cortef   - bear hugger, serial temperature     Respiratory  #Hypercarbia/hypoxic respiratory failure   #H/o COPD on home O2  - ACVC  - nebulizers       #Bilateral pleural effusions  - Lasix      Cardiovascular  #H/o HTN  - continue home Metoprolol      #H/o A.fib  - continue home Xarelto     #H/o HFpEF 63%     Cardiology following      GI  #H/o GERD  #H/o GI bleed  - protonix      Renal  #Hyperkalemia  - give lokelma, 10 units insulin, D50  - bicarb infusion  - follow BMP     #Lactic acidosis  #CKD  - IVF   - follow BMP     Nephrology following      Infectious disease  #No active concerns   - blood cultures pending   - empiric ceftriaxone and doxycycline     Heme/Onc  #No active concerns  - on Xarelto      Endocrine  #Myxedema coma  - repeat TSH and T4  - IV levothyroxine   - IV solu-cortef      Social/Spiritual/DNR/Other  Code status: full code  Diet ADULT DIET; Regular; Low Potassium (Less than 3000 mg/day)  Stress ulcer prophylaxis: Protonix   DVT prophylaxis:  on Xarelto for A.fib  Consultations needed: Yes  Transfer out of ICU today? No     Lines/catheters  Date 12/1/22  Peripheral IV left and right antecubital      Plan:  Insulin, D50, lokelma, bicarb drip for hyperkalemia-- nephrology following  Follow BMP  BiPAP for hypercarbia/hypoxic respiratory failure  IV levothyroxine and solu-cortef  Serial temperatures  Empiric abx, ceftriaxone and doxycycline      Attending Physician Attestation: Dr. Say Bosch    Thank you very much for allowing me to see this patient in consultation and follow up. I personally saw, examined and provided care for the patient.  Radiographs, labs and medication list were reviewed by me independently. I spoke with bedside nursing, respiratory therapists and consultants. Critical care services and times documented are independent of procedures and multidisciplinary rounds with Residents. Additionally comprehensive, multidisciplinary rounds were conducted with the MICU team. The case was discussed in detail and plans for care were established. Review of Residents documentation was conducted and revisions were made as appropriate. I agree with the the above documented information. No current facility-administered medications for this encounter.      Current Outpatient Medications   Medication Sig Dispense Refill    digoxin (LANOXIN) 125 MCG tablet Take 1 tablet by mouth daily 30 tablet 3    pantoprazole (PROTONIX) 40 MG tablet Take 1 tablet by mouth 2 times daily (before meals) 30 tablet 3    bumetanide (BUMEX) 2 MG tablet Take 1 tablet by mouth 2 times daily 30 tablet 3    fluticasone (FLONASE) 50 MCG/ACT nasal spray 2 sprays by Nasal route daily      levothyroxine (SYNTHROID) 50 MCG tablet Take 50 mcg by mouth Daily Given with Levothyroxine 200 mcg total dose Levothyroxine 250 mcg      levothyroxine (SYNTHROID) 200 MCG tablet Take 200 mcg by mouth Daily Given with Levothyroxine 50 mcg total dose Levothyroxine 250 mcg      metoprolol succinate (TOPROL XL) 50 MG extended release tablet Take 50 mg by mouth daily      ketotifen (ZADITOR) 0.025 % ophthalmic solution Place 1 drop into both eyes 2 times daily as needed      albuterol sulfate HFA (VENTOLIN HFA) 108 (90 Base) MCG/ACT inhaler Inhale 1 puff into the lungs every 6 hours as needed for Wheezing      terazosin (HYTRIN) 2 MG capsule Take 4 mg by mouth nightly      Budeson-Glycopyrrol-Formoterol (BREZTRI AEROSPHERE) 160-9-4.8 MCG/ACT AERO Inhale 2 puffs into the lungs 2 times daily      albuterol (PROVENTIL) (2.5 MG/3ML) 0.083% nebulizer solution Take 2.5 mg by nebulization every 6 hours as needed for Wheezing sodium chloride (OCEAN) 0.65 % nasal spray 1 spray by Nasal route daily      OXYGEN Inhale 4 L into the lungs continuous      rivaroxaban (XARELTO) 15 MG TABS tablet Take 15 mg by mouth Daily with supper       vitamin D 25 MCG (1000 UT) CAPS Take 1,000 Units by mouth daily       sucralfate (CARAFATE) 1 GM tablet Take 1 tablet by mouth three times daily 120 tablet 0        XR CHEST PORTABLE   Final Result   Improved positioning of the left jugular central venous catheter. The tip is   now positioned at the superior vena cava confluence/cavoatrial junction. RECOMMENDATION:   Careful clinical correlation and follow up recommended. XR CHEST PORTABLE   Final Result   1. Endotracheal tube and gastric tube well positioned. 2. Left jugular central venous catheter tip in the mid brachiocephalic vein. No pneumothorax. 3. Moderate perihilar pulmonary edema and small right pleural effusion. RECOMMENDATION:   Careful clinical correlation and follow up recommended. XR ABDOMEN FOR NG/OG/NE TUBE PLACEMENT   Final Result   Gastric tube in good position. RECOMMENDATION:   Careful clinical correlation and follow up recommended. CT CHEST WO CONTRAST   Final Result   Cardiomegaly and bilateral pleural effusions more prominent on the right. Mild stranding of the peritoneal fat planes is subtle free fluid visualized   in the paracolic gutters. CT ABDOMEN PELVIS WO CONTRAST Additional Contrast? None   Final Result   Cardiomegaly and bilateral pleural effusions more prominent on the right. Mild stranding of the peritoneal fat planes is subtle free fluid visualized   in the paracolic gutters. XR CHEST PORTABLE   Final Result   Cardiomegaly with moderate interstitial pulmonary edema. CT to follow. CT Head W/O Contrast   Final Result   No acute intracranial abnormality. Chronic microvascular disease, no evidence of acute territorial infarct is   seen. Physical Examination:  Vitals:   Vitals:    12/02/22 0800 12/02/22 0825 12/02/22 0900 12/02/22 1100   BP:       Pulse: 63 50 (!) 46 (!) 38   Resp: 21 24 21 24   Temp: 99.9 °F (37.7 °C)      TempSrc: Bladder      SpO2: (!) 87% (!) 86% (!) 85% (!) 68%   Weight:       Height:               ASSESSMENT:  Myxedema Coma  Non-Sustained Ventricular Tachycardia   B/L Pleural Effusions  Subsegmental Atelectasis   obstructive sleep apnea noncompliant with CPAP use  COPD with Acute Exacerbation   Acute on Chronic Hypoxic and Hypercapnic Respiratory Failure   Obesity - BMI 38  Hyperkalemia - suspect relative acidosis, family opted against hemodialysis  GERD  JUDITH on CKD  Tobacco Use  Alcohol Use  Permanent atrial fibrillation on Xarelto for anticoagulation  Calcified Lung Nodules   Cardiomegaly      In addition the following applies:     - family opted not to have hemodialysis  - after extensive conversation with family, they opted for [facility] and hospice route  - once family present we will plan for morphine and compassionate extubation for patient   - all questions were answered for family     Thank you for allowing me to participate in the care of this patient. Care reviewed with nursing staff, medical and surgical specialty care, primary care and the patient's family as available. Restraints are ordered when the patient can do harm to him/herself by pulling out devices. Critical Care Time: 35 minutes excluding procedures    Umm Baez M.D.     Umm Baez MD  12/2/2022  5:05 PM

## 2022-12-02 NOTE — PATIENT CARE CONFERENCE
Intensive Care Daily Quality Rounding Checklist      ICU Team Members:     ICU Day #: NUMBER: 2    Intubation Date: December 1    Ventilator Day #:     Central Line Insertion Date:          Day #:      Arterial Line Insertion Date:        Day #:     Temporary Hemodialysis Catheter Insertion Date:        Day #     DVT Prophylaxis:    GI Prophylaxis:    [place] Catheter Insertion Date: December 2       Day #: 2      Continued need (if yes, reason documented and discussed with physician): yes, strict I's and o's    Skin Issues/ Wounds and ordered treatment discussed on rounds: yes  Goals/ Plans for the Day: terminal extubation p/families wishes, emotional support and pastoral care to comfort the family at the bedside

## 2022-12-02 NOTE — DISCHARGE SUMMARY
[facility] Physician Discharge Summary       No follow-up provider specified. Dispo:         Patient ID:  Pretty Armijo.  [id number]  78 y.o.  1945    Admit date: 2022    Date and time of death: 23 at 11:25 AM    Admission Diagnoses: Principal Problem:    Acute respiratory failure with hypercapnia ([address]  Active Problems:    Hyperkalemia    Hypothermia  Resolved Problems:    * No resolved hospital problems. *      Discharge Diagnoses: Principal Problem:    Acute respiratory failure with hypercapnia (HCC)  Active Problems:    Hyperkalemia    Hypothermia  Resolved Problems:    * No resolved hospital problems. *      Consults:  IP CONSULT TO CARDIOLOGY  IP CONSULT TO NEPHROLOGY  IP CONSULT TO CRITICAL CARE  IP CONSULT TO PALLIATIVE CARE    Procedures:   1. Intubation  2. Terminal extubation  3. Arterial line placement  4. Central line placement    Hospital Course:   Patient Pretty Armijo is a 78 y.o. presented with Hyperkalemia [E87.5]  Bilateral pleural effusion [J90]  Acute kidney injury ([address] [N17.9]  Acute respiratory failure with hypercapnia (HCC) [J96.02]  Hypothermia, initial encounter [T68. XXXA]  Altered mental status, unspecified altered mental status type [R41.82]    This is a 79-year-old male with past medical history of chronic respiratory acidosis, CKD stage III, hypothyroidism, paroxysmal atrial fibrillation, hypertension, and history of GI bleed. Patient presented with change in mentation likely due to acute on chronic hypoxic/hypercapnic respiratory failure, hyperkalemia, abnormal thyroid panel. Likely there was noncompliance in regards to his diuretics, digoxin, and Synthroid. Patient was admitted for recalcitrant hyperkalemia to the intensive care unit. Patient did not respond to [facility] and insulin drip. Mentation did not improve and he was intubated to secure airway. Family was contacted in regards to [address] [place] [address] Street.   CODE STATUS changed to DNR CC. Patient was terminally extubated and passed away at 1125 with family at bedside. Discharge Exam:    General Appearance: Unresponsive  Skin: warm and dry  Head: normocephalic and atraumatic  Eyes: pupils equal, round, and non-reactive to light, no extraocular eye movements, conjunctivae normal  Neck: neck supple and non tender without mass   Pulmonary/Chest: No breath sounds   cardiovascular: No heart sounds  Abdomen: soft, non-tender, non-distended, no bowel sounds, no masses or organomegaly  Extremities: no cyanosis, no clubbing and no edema  Neurologic: Unresponsive    I/O last 3 completed shifts: In: 2265.3 [I.V.:1997.8; IV Piggyback:267.5]  Out: 35 [Urine:35]  I/O this shift:  In: -   Out: 35 [Urine:35]      LABS:  Recent Labs     12/01/22  2100 12/02/22  0056 12/02/22  0450 12/02/22  0634     --  126* 126*   K 5.4* 5.8* 6.3* 6.8*   CL 94*  --  90* 89*   CO2 30*  --  29 29   BUN 55*  --  56* 57*   CREATININE 3.5*  --  3.5* 3.6*   GLUCOSE 32*  --  100* 86   CALCIUM 8.6  --  8.1* 8.2*       Recent Labs     12/01/22  1158 12/02/22  0450   WBC 3.5* 14.3*   RBC 4.69 4.58   HGB 11.4* 10.9*   HCT 44.0 40.4   MCV 93.8 88.2   MCH 24.3* 23.8*   MCHC 25.9* 27.0*   RDW 28.6* 28.6*   PLT 95* 121*   MPV NOT CALC NOT CALC       No results for input(s): POCGLU in the last 72 hours. Imaging:  CT ABDOMEN PELVIS WO CONTRAST Additional Contrast? None    Result Date: 12/1/2022  EXAMINATION: CT OF THE CHEST WITHOUT CONTRAST; CT OF THE ABDOMEN AND PELVIS WITHOUT CONTRAST 12/1/2022 2:30 pm TECHNIQUE: CT of the chest was performed without the administration of intravenous contrast. Multiplanar reformatted images are provided for review.  Automated exposure control, iterative reconstruction, and/or weight based adjustment of the mA/kV was utilized to reduce the radiation dose to as low as reasonably achievable.; CT of the abdomen and pelvis was performed without the administration of intravenous contrast. Multiplanar reformatted images are provided for review. Automated exposure control, iterative reconstruction, and/or weight based adjustment of the mA/kV was utilized to reduce the radiation dose to as low as reasonably achievable. COMPARISON: None. HISTORY: ORDERING SYSTEM PROVIDED HISTORY: altered, short of breath TECHNOLOGIST PROVIDED HISTORY: Reason for exam:->altered, short of breath Decision Support Exception - unselect if not a suspected or confirmed emergency medical condition->Emergency Medical Condition (MA); ORDERING SYSTEM PROVIDED HISTORY: JUDITH, rule out obstruction TECHNOLOGIST PROVIDED HISTORY: Reason for exam:->JUDITH, rule out obstruction Additional Contrast?->None Decision Support Exception - unselect if not a suspected or confirmed emergency medical condition->Emergency Medical Condition (MA) FINDINGS: CHEST CT SCAN: The heart is moderately enlarged, small pericardial effusion is seen. Subtle atherosclerotic calcifications visualized in the coronary vessels. No significant hilar or mediastinal lymphadenopathy is seen. Scattered calcifications visualized in the aortic arch, no evidence for aneurysmal dilatation or arterial dissection is seen. Bilateral lower lobe consolidations most prominent in the right lower lobe, large right pleural effusion small left pleural effusion seen. Mild bronchovascular and interstitial prominence is seen. Mild bronchial wall thickening is visualized. No evidence of pneumothorax is seen. No evidence of endobronchial or endoluminal lesions are seen. Degenerative bone changes are seen. ABDOMEN AND PELVIS CT SCAN: The liver is small in size and demonstrates increased attenuation, no evidence of masses no evidence of intrahepatic biliary dilatation is seen. The gallbladder is surgically absent The common bile duct is unremarkable. The pancreas and spleen demonstrate no evidence of masses. The adrenal glands demonstrate unremarkable contours with no evidence of masses.  The kidneys are atrophic, demonstrate no evidence of stones no evidence of renal masses. No evidence of hydronephrosis or hydroureter is seen. GI/Bowel: The stomach is unremarkable, no evidence of masses. No significant distention of the small and large bowel loops is visualized. The appendix is visualized and is unremarkable. Abundance of stool is visualized in the large bowel. Scattered diverticular disease visualized but no evidence of acute diverticulitis. Pelvis: The urinary bladder is not optimally distended. The Palomino catheter is visualized within the urinary bladder. No evidence of pelvic mass is seen. Peritoneum/Retroperitoneum: Subtle free fluid is seen in the paracolic gutters, no evidence of air within the peritoneal cavity. Bones/Soft Tissues Abdominal wall soft tissues are unremarkable. No evidence of lytic or sclerotic bone lesion is seen. Degenerative changes of the lumbar spine visualized. Vessels: Extensive atherosclerotic calcifications visualized in the abdominal and pelvic vessels but no evidence of aneurysmal dilatation arterial dissection is seen. Cardiomegaly and bilateral pleural effusions more prominent on the right. Mild stranding of the peritoneal fat planes is subtle free fluid visualized in the paracolic gutters. CT Head W/O Contrast    Result Date: 12/1/2022  EXAMINATION: CT OF THE HEAD WITHOUT CONTRAST  12/1/2022 1:11 pm TECHNIQUE: CT of the head was performed without the administration of intravenous contrast. Automated exposure control, iterative reconstruction, and/or weight based adjustment of the mA/kV was utilized to reduce the radiation dose to as low as reasonably achievable. COMPARISON: None. HISTORY: ORDERING SYSTEM PROVIDED HISTORY: altered mental status TECHNOLOGIST PROVIDED HISTORY: Reason for exam:->altered mental status Has a \"code stroke\" or \"stroke alert\" been called? ->No Decision Support Exception - unselect if not a suspected or confirmed emergency medical condition->Emergency Medical Condition (MA) FINDINGS: BRAIN/VENTRICLES: There is no acute intracranial hemorrhage, mass effect or midline shift. No abnormal extra-axial fluid collection. Scattered there is a fluid attenuation of visualized in the periventricular and subcortical white matter consistent with chronic microvascular disease. No evidence of acute territorial infarct is seen. The ventricles and sulci are unremarkable for the patient's age. ORBITS: The visualized portion of the orbits demonstrate no acute abnormality. SINUSES: The visualized paranasal sinuses and mastoid air cells demonstrate no acute abnormality. SOFT TISSUES/SKULL:  No acute abnormality of the visualized skull. Right frontal subcutaneous soft tissue swelling is seen. No acute intracranial abnormality. Chronic microvascular disease, no evidence of acute territorial infarct is seen. CT CHEST WO CONTRAST    Result Date: 12/1/2022  EXAMINATION: CT OF THE CHEST WITHOUT CONTRAST; CT OF THE ABDOMEN AND PELVIS WITHOUT CONTRAST 12/1/2022 2:30 pm TECHNIQUE: CT of the chest was performed without the administration of intravenous contrast. Multiplanar reformatted images are provided for review. Automated exposure control, iterative reconstruction, and/or weight based adjustment of the mA/kV was utilized to reduce the radiation dose to as low as reasonably achievable.; CT of the abdomen and pelvis was performed without the administration of intravenous contrast. Multiplanar reformatted images are provided for review. Automated exposure control, iterative reconstruction, and/or weight based adjustment of the mA/kV was utilized to reduce the radiation dose to as low as reasonably achievable. COMPARISON: None.  HISTORY: ORDERING SYSTEM PROVIDED HISTORY: altered, short of breath TECHNOLOGIST PROVIDED HISTORY: Reason for exam:->altered, short of breath Decision Support Exception - unselect if not a suspected or confirmed emergency medical condition->Emergency Medical Condition (MA); ORDERING SYSTEM PROVIDED HISTORY: JUDITH, rule out obstruction TECHNOLOGIST PROVIDED HISTORY: Reason for exam:->JUDITH, rule out obstruction Additional Contrast?->None Decision Support Exception - unselect if not a suspected or confirmed emergency medical condition->Emergency Medical Condition (MA) FINDINGS: CHEST CT SCAN: The heart is moderately enlarged, small pericardial effusion is seen. Subtle atherosclerotic calcifications visualized in the coronary vessels. No significant hilar or mediastinal lymphadenopathy is seen. Scattered calcifications visualized in the aortic arch, no evidence for aneurysmal dilatation or arterial dissection is seen. Bilateral lower lobe consolidations most prominent in the right lower lobe, large right pleural effusion small left pleural effusion seen. Mild bronchovascular and interstitial prominence is seen. Mild bronchial wall thickening is visualized. No evidence of pneumothorax is seen. No evidence of endobronchial or endoluminal lesions are seen. Degenerative bone changes are seen. ABDOMEN AND PELVIS CT SCAN: The liver is small in size and demonstrates increased attenuation, no evidence of masses no evidence of intrahepatic biliary dilatation is seen. The gallbladder is surgically absent The common bile duct is unremarkable. The pancreas and spleen demonstrate no evidence of masses. The adrenal glands demonstrate unremarkable contours with no evidence of masses. The kidneys are atrophic, demonstrate no evidence of stones no evidence of renal masses. No evidence of hydronephrosis or hydroureter is seen. GI/Bowel: The stomach is unremarkable, no evidence of masses. No significant distention of the small and large bowel loops is visualized. The appendix is visualized and is unremarkable. Abundance of stool is visualized in the large bowel. Scattered diverticular disease visualized but no evidence of acute diverticulitis. Pelvis:  The urinary bladder is not optimally distended. The Palomino catheter is visualized within the urinary bladder. No evidence of pelvic mass is seen. Peritoneum/Retroperitoneum: Subtle free fluid is seen in the paracolic gutters, no evidence of air within the peritoneal cavity. Bones/Soft Tissues Abdominal wall soft tissues are unremarkable. No evidence of lytic or sclerotic bone lesion is seen. Degenerative changes of the lumbar spine visualized. Vessels: Extensive atherosclerotic calcifications visualized in the abdominal and pelvic vessels but no evidence of aneurysmal dilatation arterial dissection is seen. Cardiomegaly and bilateral pleural effusions more prominent on the right. Mild stranding of the peritoneal fat planes is subtle free fluid visualized in the paracolic gutters. XR CHEST PORTABLE    Result Date: 12/2/2022  EXAMINATION: ONE XRAY VIEW OF THE CHEST 12/2/2022 5:19 am COMPARISON: 1 hour prior HISTORY: ORDERING SYSTEM PROVIDED HISTORY: central line advancement TECHNOLOGIST PROVIDED HISTORY: Reason for exam:->central line advancement FINDINGS: Endotracheal tube and gastric tube remain well positioned. Interim advancement of left jugular central venous catheter. The catheter tip is now positioned at the superior vena cava confluence/cavoatrial junction. Unchanged small right basilar pleural/parenchymal consolidation. Improved positioning of the left jugular central venous catheter. The tip is now positioned at the superior vena cava confluence/cavoatrial junction. RECOMMENDATION: Careful clinical correlation and follow up recommended. XR CHEST PORTABLE    Result Date: 12/2/2022  EXAMINATION: ONE XRAY VIEW OF THE CHEST 12/2/2022 4:48 am COMPARISON: None. HISTORY: ORDERING SYSTEM PROVIDED HISTORY: CVC and ETT placement TECHNOLOGIST PROVIDED HISTORY: Reason for exam:->CVC and ETT placement FINDINGS: Endotracheal tube and gastric tube well positioned.   Left jugular central venous catheter tip in the mid brachiocephalic vein. Moderate enlargement of the cardiomediastinal silhouette. Moderate bilateral perihilar and interstitial infiltrates. Right hemidiaphragm obscured. No pneumothorax. Osseous thorax intact. 1. Endotracheal tube and gastric tube well positioned. 2. Left jugular central venous catheter tip in the mid brachiocephalic vein. No pneumothorax. 3. Moderate perihilar pulmonary edema and small right pleural effusion. RECOMMENDATION: Careful clinical correlation and follow up recommended. XR CHEST PORTABLE    Result Date: 12/1/2022  EXAMINATION: ONE XRAY VIEW OF THE CHEST 12/1/2022 2:21 pm COMPARISON: 2 May 2022 HISTORY: ORDERING SYSTEM PROVIDED HISTORY: shortness of breath TECHNOLOGIST PROVIDED HISTORY: Reason for exam:->shortness of breath FINDINGS: Single AP erect portable chest demonstrates prominent pulmonary vascularity with marked cardiomegaly and small pleural effusions most pronounced on the right there is questionable right basilar infiltrate is well. There is no evidence of a pneumothorax. Cardiomegaly with moderate interstitial pulmonary edema. CT to follow. XR ABDOMEN FOR NG/OG/NE TUBE PLACEMENT    Result Date: 12/2/2022  EXAMINATION: ONE SUPINE XRAY VIEW(S) OF THE ABDOMEN 12/2/2022 4:48 am COMPARISON: None. HISTORY: ORDERING SYSTEM PROVIDED HISTORY: Confirmation of course of NG/OG/NE tube and location of tip of tube TECHNOLOGIST PROVIDED HISTORY: Reason for exam:->Confirmation of course of NG/OG/NE tube and location of tip of tube Portable? ->Yes FINDINGS: Proximal side-hole gastric tube beneath the hemidiaphragms, good position. Small amount of enteric contrast noted. No ileus or obstruction. Gastric tube in good position. RECOMMENDATION: Careful clinical correlation and follow up recommended.        Patient Instructions:      Medication List        ASK your doctor about these medications      Breztri Aerosphere 160-9-4.8 MCG/ACT Aero  Generic drug: Budeson-Glycopyrrol-Formoterol     bumetanide 2 MG tablet  Commonly known as: BUMEX  Take 1 tablet by mouth 2 times daily     digoxin 125 MCG tablet  Commonly known as: LANOXIN  Take 1 tablet by mouth daily     fluticasone 50 MCG/ACT nasal spray  Commonly known as: FLONASE     ketotifen 0.025 % ophthalmic solution  Commonly known as: ZADITOR     * levothyroxine 50 MCG tablet  Commonly known as: SYNTHROID     * levothyroxine 200 MCG tablet  Commonly known as: SYNTHROID     metoprolol succinate 50 MG extended release tablet  Commonly known as: TOPROL XL     OXYGEN     pantoprazole 40 MG tablet  Commonly known as: PROTONIX  Take 1 tablet by mouth 2 times daily (before meals)     sodium chloride 0.65 % nasal spray  Commonly known as: OCEAN     sucralfate 1 GM tablet  Commonly known as: CARAFATE  Take 1 tablet by mouth three times daily     terazosin 2 MG capsule  Commonly known as: HYTRIN     * Ventolin  (90 Base) MCG/ACT inhaler  Generic drug: albuterol sulfate HFA     * albuterol (2.5 MG/3ML) 0.083% nebulizer solution  Commonly known as: PROVENTIL     vitamin D 25 MCG (1000 UT) Caps     Xarelto 15 MG Tabs tablet  Generic drug: rivaroxaban           * This list has 4 medication(s) that are the same as other medications prescribed for you. Read the directions carefully, and ask your doctor or other care provider to review them with you.                     Note that 35 minutes was spent in preparing discharge papers, discussing discharge with patient, medication review, etc.    Signed:  Electronically signed by Arabella Mata DO on 12/2/2022 at 1:59 PM

## 2022-12-03 LAB
CREATININE URINE: 261 MG/DL (ref 40–278)
MICROALBUMIN UR-MCNC: 77.7 MG/L
MICROALBUMIN/CREAT UR-RTO: 29.8 (ref 0–30)
MRSA CULTURE ONLY: NORMAL

## 2022-12-06 LAB
BLOOD CULTURE, ROUTINE: NORMAL
CULTURE, BLOOD 2: NORMAL
EKG ATRIAL RATE: 394 BPM
EKG Q-T INTERVAL: 524 MS
EKG QRS DURATION: 158 MS
EKG QTC CALCULATION (BAZETT): 458 MS
EKG R AXIS: 177 DEGREES
EKG T AXIS: 125 DEGREES
EKG VENTRICULAR RATE: 46 BPM

## (undated) DEVICE — CANNULA NSL ORAL AD FOR CAPNOFLEX CO2 O2 AIRLFE

## (undated) DEVICE — DEFENDO AIR WATER SUCTION AND BIOPSY VALVE KIT FOR  OLYMPUS: Brand: DEFENDO AIR/WATER/SUCTION AND BIOPSY VALVE

## (undated) DEVICE — BITEBLOCK 54FR W/ DENT RIM BLOX

## (undated) DEVICE — FORCEPS BX L160CM JAW DIA2.4MM YEL L CAP W/ NDL DISP RAD

## (undated) DEVICE — MASK O2 AD L7IN W/ TBNG STD CONN M CONC BARB FIT E STRP ADJ

## (undated) DEVICE — SPONGE GZ W4XL4IN RAYON POLY FILL CVR W/ NONWOVEN FAB

## (undated) DEVICE — CONTAINER SPEC 60ML PH 7NEUTRAL BUFF FRMLN RDY TO USE

## (undated) DEVICE — Z DISCONTINUED NO SUB IDED TUBING ETCO2 AD L6.5FT NSL ORAL CVD PRNG NONFLARED TIP OVR